# Patient Record
Sex: MALE | Race: WHITE | NOT HISPANIC OR LATINO | URBAN - METROPOLITAN AREA
[De-identification: names, ages, dates, MRNs, and addresses within clinical notes are randomized per-mention and may not be internally consistent; named-entity substitution may affect disease eponyms.]

---

## 2017-06-24 ENCOUNTER — INPATIENT HOSPITAL (OUTPATIENT)
Dept: URBAN - METROPOLITAN AREA HOSPITAL 84 | Facility: HOSPITAL | Age: 52
End: 2017-06-24

## 2017-06-24 DIAGNOSIS — K85.90 ACUTE PANCREATITIS WITHOUT NECROSIS OR INFECTION, UNSPECIFIE: ICD-10-CM

## 2017-06-24 PROCEDURE — 99251: CPT | Performed by: INTERNAL MEDICINE

## 2017-06-25 ENCOUNTER — INPATIENT HOSPITAL (OUTPATIENT)
Dept: URBAN - METROPOLITAN AREA HOSPITAL 84 | Facility: HOSPITAL | Age: 52
End: 2017-06-25
Payer: COMMERCIAL

## 2017-06-25 DIAGNOSIS — K85.90 ACUTE PANCREATITIS WITHOUT NECROSIS OR INFECTION, UNSPECIFIE: ICD-10-CM

## 2017-06-25 PROCEDURE — 99232 SBSQ HOSP IP/OBS MODERATE 35: CPT | Performed by: INTERNAL MEDICINE

## 2017-07-05 ENCOUNTER — OFFICE (OUTPATIENT)
Dept: URBAN - METROPOLITAN AREA CLINIC 64 | Facility: CLINIC | Age: 52
End: 2017-07-05
Payer: COMMERCIAL

## 2017-07-05 VITALS
WEIGHT: 204 LBS | HEIGHT: 74 IN | SYSTOLIC BLOOD PRESSURE: 110 MMHG | HEART RATE: 75 BPM | DIASTOLIC BLOOD PRESSURE: 69 MMHG

## 2017-07-05 DIAGNOSIS — K80.20 CALCULUS OF GALLBLADDER WITHOUT CHOLECYSTITIS WITHOUT OBSTRU: ICD-10-CM

## 2017-07-05 DIAGNOSIS — K59.1 FUNCTIONAL DIARRHEA: ICD-10-CM

## 2017-07-05 DIAGNOSIS — K85.90 ACUTE PANCREATITIS WITHOUT NECROSIS OR INFECTION, UNSPECIFIE: ICD-10-CM

## 2017-07-05 PROCEDURE — 99213 OFFICE O/P EST LOW 20 MIN: CPT | Performed by: NURSE PRACTITIONER

## 2017-07-12 LAB
LIPID PANEL: CHOLESTEROL, TOTAL: 96 MG/DL — LOW (ref 100–199)
LIPID PANEL: COMMENT: (no result)
LIPID PANEL: HDL CHOLESTEROL: 17 MG/DL — LOW (ref 39–?)
LIPID PANEL: LDL CHOLESTEROL CALC: (no result) MG/DL
LIPID PANEL: TRIGLYCERIDES: 735 MG/DL — CRITICAL HIGH (ref 0–149)
LIPID PANEL: VLDL CHOLESTEROL CAL: (no result) MG/DL

## 2017-07-21 LAB
C DIFFICILE TOXINS A+B, EIA: NEGATIVE
OVA + PARASITE EXAM: (no result)
RESULT: RESULT 1: (no result)
STOOL CULTURE: CAMPYLOBACTER CULTURE: (no result)
STOOL CULTURE: E COLI SHIGA TOXIN EIA: NEGATIVE
STOOL CULTURE: SALMONELLA/SHIGELLA SCREEN: (no result)
WHITE BLOOD CELLS (WBC), STOOL: (no result)

## 2017-08-23 ENCOUNTER — HOSPITAL ENCOUNTER (OUTPATIENT)
Dept: LAB | Facility: HOSPITAL | Age: 52
Discharge: HOME | End: 2017-08-23
Attending: SURGERY | Admitting: SURGERY

## 2017-08-23 LAB
ALBUMIN SERPL-MCNC: 3.8 G/DL (ref 3.5–4.8)
ALBUMIN/GLOB SERPL: 1.8 {RATIO} (ref 1–1.7)
ALP SERPL-CCNC: 58 IU/L (ref 32–91)
ALT SERPL-CCNC: ABNORMAL IU/L (ref 17–63)
ANION GAP SERPL CALC-SCNC: 18.9 MMOL/L (ref 10–20)
AST SERPL-CCNC: ABNORMAL IU/L (ref 15–41)
BILIRUB SERPL-MCNC: ABNORMAL MG/DL (ref 0.3–1.2)
BUN SERPL-MCNC: 11 MG/DL (ref 8–20)
BUN/CREAT SERPL: 36.7 (ref 6.2–20.3)
CALCIUM SERPL-MCNC: 9.3 MG/DL (ref 8.9–10.3)
CHLORIDE SERPL-SCNC: 97 MMOL/L (ref 101–111)
CONV CO2: 22 MMOL/L (ref 22–32)
CONV TOTAL PROTEIN: 5.9 G/DL (ref 6.1–7.9)
CREAT UR-MCNC: 0.3 MG/DL (ref 0.7–1.2)
GLOBULIN UR ELPH-MCNC: 2.1 G/DL (ref 2.5–3.8)
GLUCOSE SERPL-MCNC: ABNORMAL MG/DL (ref 65–99)
POTASSIUM SERPL-SCNC: ABNORMAL MMOL/L (ref 3.6–5.1)
SODIUM SERPL-SCNC: 133 MMOL/L (ref 136–144)

## 2017-08-25 ENCOUNTER — HOSPITAL ENCOUNTER (OUTPATIENT)
Dept: OTHER | Facility: HOSPITAL | Age: 52
Setting detail: SPECIMEN
Discharge: HOME | End: 2017-08-25
Attending: SURGERY | Admitting: SURGERY

## 2018-06-20 ENCOUNTER — HOSPITAL ENCOUNTER (OUTPATIENT)
Dept: CARDIOLOGY | Facility: HOSPITAL | Age: 53
Discharge: HOME | End: 2018-06-20
Attending: SURGERY | Admitting: SURGERY

## 2019-02-08 ENCOUNTER — HOSPITAL ENCOUNTER (OUTPATIENT)
Dept: LAB | Facility: HOSPITAL | Age: 54
Setting detail: SPECIMEN
Discharge: HOME | End: 2019-02-08
Attending: INTERNAL MEDICINE | Admitting: INTERNAL MEDICINE

## 2019-02-08 LAB
ALBUMIN SERPL-MCNC: 3.8 G/DL (ref 3.5–4.8)
ALBUMIN/GLOB SERPL: 1.6 {RATIO} (ref 1–1.7)
ALP SERPL-CCNC: 70 IU/L (ref 32–91)
ALT SERPL-CCNC: 23 IU/L (ref 17–63)
ANION GAP SERPL CALC-SCNC: 16.8 MMOL/L (ref 10–20)
AST SERPL-CCNC: 23 IU/L (ref 15–41)
BILIRUB SERPL-MCNC: 0.8 MG/DL (ref 0.3–1.2)
BUN SERPL-MCNC: 15 MG/DL (ref 8–20)
BUN/CREAT SERPL: 18.8 (ref 6.2–20.3)
CALCIUM SERPL-MCNC: 9.4 MG/DL (ref 8.9–10.3)
CHLORIDE SERPL-SCNC: 97 MMOL/L (ref 101–111)
CHOLEST SERPL-MCNC: 432 MG/DL
CONV CO2: 22 MMOL/L (ref 22–32)
CONV LDL CHOLESTEROL DIRECT: ABNORMAL MG/DL (ref 0–100)
CONV MICROALBUM.,U,RANDOM: 228 MG/L
CONV TOTAL PROTEIN: 6.2 G/DL (ref 6.1–7.9)
CREAT 24H UR-MCNC: 80.6 MG/DL
CREAT UR-MCNC: 0.8 MG/DL (ref 0.7–1.2)
GLOBULIN UR ELPH-MCNC: 2.4 G/DL (ref 2.5–3.8)
GLUCOSE SERPL-MCNC: 259 MG/DL (ref 65–99)
HDLC SERPL-MCNC: ABNORMAL MG/DL
LIPID INTERPRETATION: ABNORMAL
MICROALBUMIN/CREAT UR: 282.9 UG/MG
POTASSIUM SERPL-SCNC: 4.8 MMOL/L (ref 3.6–5.1)
SODIUM SERPL-SCNC: ABNORMAL MMOL/L (ref 136–144)
TRIGL SERPL-MCNC: ABNORMAL MG/DL

## 2019-06-14 ENCOUNTER — HOSPITAL ENCOUNTER (OUTPATIENT)
Dept: CARDIOLOGY | Facility: HOSPITAL | Age: 54
Discharge: HOME | End: 2019-06-14
Attending: INTERNAL MEDICINE | Admitting: INTERNAL MEDICINE

## 2019-06-28 RX ORDER — GLIMEPIRIDE 2 MG/1
TABLET ORAL
Qty: 180 TABLET | Refills: 1 | Status: SHIPPED | OUTPATIENT
Start: 2019-06-28 | End: 2020-01-03

## 2019-07-08 RX ORDER — INSULIN ASPART 100 [IU]/ML
INJECTION, SOLUTION INTRAVENOUS; SUBCUTANEOUS
Qty: 45 ML | Refills: 4 | Status: SHIPPED | OUTPATIENT
Start: 2019-07-08 | End: 2020-05-22

## 2019-08-01 ENCOUNTER — TELEPHONE (OUTPATIENT)
Dept: ENDOCRINOLOGY | Facility: CLINIC | Age: 54
End: 2019-08-01

## 2019-08-01 NOTE — TELEPHONE ENCOUNTER
CALLED PATIENT TO RESCHEDULE THE 08/16 DR. JOHANSEN APPT.. HAD TO LEAVE A VM TO CALL THE LENNIE TO RE-SCHEDULE

## 2019-09-05 PROBLEM — E78.5 HYPERLIPIDEMIA: Status: ACTIVE | Noted: 2019-09-05

## 2019-09-05 PROBLEM — I10 HYPERTENSION: Status: ACTIVE | Noted: 2019-09-05

## 2019-09-05 PROBLEM — E55.9 VITAMIN D DEFICIENCY: Status: ACTIVE | Noted: 2018-08-03

## 2019-09-05 PROBLEM — I25.10 CORONARY ARTERY DISEASE: Status: ACTIVE | Noted: 2019-09-05

## 2019-09-05 RX ORDER — METOPROLOL TARTRATE 50 MG/1
TABLET, FILM COATED ORAL
COMMUNITY
Start: 2018-06-22 | End: 2020-01-03

## 2019-09-05 RX ORDER — AMLODIPINE BESYLATE 5 MG/1
TABLET ORAL EVERY 24 HOURS
COMMUNITY
Start: 2014-09-05 | End: 2020-11-20 | Stop reason: SDUPTHER

## 2019-09-05 RX ORDER — CHLORAL HYDRATE 500 MG
CAPSULE ORAL
COMMUNITY
Start: 2017-07-12 | End: 2020-01-17

## 2019-09-05 RX ORDER — CHOLESTYRAMINE 4 G/9G
POWDER, FOR SUSPENSION ORAL
COMMUNITY
Start: 2014-09-08 | End: 2020-06-01

## 2019-09-05 RX ORDER — CHOLECALCIFEROL (VITAMIN D3) 1250 MCG
1 CAPSULE ORAL
COMMUNITY
Start: 2018-08-03 | End: 2020-01-17

## 2019-09-05 RX ORDER — FENOFIBRATE 160 MG/1
TABLET ORAL EVERY 24 HOURS
COMMUNITY
Start: 2018-09-18 | End: 2021-07-02 | Stop reason: SDUPTHER

## 2019-09-05 RX ORDER — LISINOPRIL 20 MG/1
TABLET ORAL EVERY 12 HOURS
COMMUNITY
Start: 2017-12-07 | End: 2021-02-12 | Stop reason: ALTCHOICE

## 2019-09-05 RX ORDER — ROSUVASTATIN CALCIUM 40 MG/1
TABLET, COATED ORAL
COMMUNITY
Start: 2014-09-05 | End: 2021-07-02 | Stop reason: SDUPTHER

## 2019-09-05 RX ORDER — ICOSAPENT ETHYL 1000 MG/1
2 CAPSULE ORAL EVERY 12 HOURS
COMMUNITY
Start: 2018-03-13 | End: 2020-07-27

## 2019-09-13 ENCOUNTER — OFFICE VISIT (OUTPATIENT)
Dept: ENDOCRINOLOGY | Facility: CLINIC | Age: 54
End: 2019-09-13

## 2019-09-13 VITALS
HEIGHT: 74 IN | BODY MASS INDEX: 27.16 KG/M2 | OXYGEN SATURATION: 98 % | HEART RATE: 67 BPM | DIASTOLIC BLOOD PRESSURE: 82 MMHG | SYSTOLIC BLOOD PRESSURE: 122 MMHG | WEIGHT: 211.6 LBS

## 2019-09-13 DIAGNOSIS — E78.2 MIXED HYPERLIPIDEMIA: ICD-10-CM

## 2019-09-13 DIAGNOSIS — E11.65 TYPE 2 DIABETES MELLITUS WITH HYPERGLYCEMIA, UNSPECIFIED WHETHER LONG TERM INSULIN USE (HCC): Primary | ICD-10-CM

## 2019-09-13 DIAGNOSIS — I10 ESSENTIAL HYPERTENSION: ICD-10-CM

## 2019-09-13 LAB — GLUCOSE BLDC GLUCOMTR-MCNC: 166 MG/DL (ref 70–130)

## 2019-09-13 PROCEDURE — 82962 GLUCOSE BLOOD TEST: CPT | Performed by: INTERNAL MEDICINE

## 2019-09-13 PROCEDURE — 99214 OFFICE O/P EST MOD 30 MIN: CPT | Performed by: INTERNAL MEDICINE

## 2019-09-13 NOTE — PATIENT INSTRUCTIONS
Jardiance 10 mg p.o. daily  Increase fluid intake  Please continue to watch her diet is specifically red meat fried foods and dairy products  Follow-up in 3 months with labs  Please always keep glucose source with you in case of low blood sugars and make sure your sugars above 100 when you are driving  Please get annual eye exam and flu vaccine.

## 2019-09-13 NOTE — PROGRESS NOTES
Endocrine Progress Note Outpatient     Patient Care Team:  Prabhu Downing MD as PCP - General  Prabhu Downing MD as PCP - Family Medicine    Chief Complaint: Follow up type 2 diabetes    HPI: 54-year-old male with history of type 2 diabetes, hypertension, hyperlipidemia specifically hypertriglyceridemia is here for follow-up.  For type 2 diabetes currently on metformin 1000 twice a day, you 500 insulin 0.15 mL subcu in the morning and 0.12 mL subcu in the evening along with NovoLog 10 units before breakfast and lunch and 22 before supper along with glimepiride forming on twice a day.  He did bring in blood sugar records for review and average sugar is about 260.  Hypertension: Well-controlled  Hyperlipidemia: He is currently on Crestor with fenofibrate and cholestyramine and with Vascepa.  We tried Praluent however he is not taking that at this time, we also referred him to a nephrologist for plasmapheresis for triglycerides however he tells me that this is cost prohibitive.  Does have history of pancreatitis.    Past Medical History:   Diagnosis Date   • CAD (coronary artery disease)    • Hyperlipidemia    • Hypertension    • Type 2 diabetes mellitus (CMS/[facility])        Social History     Socioeconomic History   • Marital status:      Spouse name: Not on file   • Number of children: Not on file   • Years of education: Not on file   • Highest education level: Not on file   Tobacco Use   • Smoking status: Never Smoker   • Smokeless tobacco: Never Used   Substance and Sexual Activity   • Alcohol use: No     Frequency: Never   • Drug use: Defer   • Sexual activity: Defer       Family History   Problem Relation Age of Onset   • Heart attack Mother    • Alcohol abuse Father        Allergies   Allergen Reactions   • Levofloxacin Unknown (See Comments)       ROS:   Constitutional:  Admit fatigue, tiredness.    Eyes:  Denies change in visual acuity   HENT:  Denies nasal congestion or sore throat   Respiratory: denies  cough, shortness of breath.   Cardiovascular:  denies chest pain, edema   GI:  Denies abdominal pain, nausea, vomiting.   Musculoskeletal:  Denies back pain or joint pain   Integument:  Denies dry skin and rash   Neurologic:  Denies headache, focal weakness or sensory changes   Endocrine:  Denies polyuria or polydipsia   Psychiatric:  Denies depression or anxiety      Vitals:    09/13/19 0926   BP: 122/82   Pulse: 67   SpO2: 98%       Physical Exam:  GEN: NAD, conversant  EYES: EOMI, PERRL, no conjunctival erythema  NECK: no thyromegaly, full ROM   CV: RRR, no murmurs/rubs/gallops, no peripheral edema  LUNG: CTAB, no wheezes/rales/ronchi  SKIN: no rashes, no acanthosis, has a skin nodules most likely from the triglycerides being elevated  MSK: no deformities, full ROM of all extremities  NEURO: no tremors, DTR normal  PSYCH: AOX3, appropriate mood, affect normal      Results Review:     I reviewed the patient's new clinical results.      Lab Results   Component Value Date    GLUCOSE 259 (H) 02/08/2019    BUN 15 02/08/2019    CREATININE 0.8 02/08/2019    BCR 18.8 02/08/2019    K 4.8 02/08/2019    CO2 22 02/08/2019    CALCIUM 9.4 02/08/2019    ALBUMIN 3.8 02/08/2019    LABIL2 1.6 02/08/2019    AST 23 02/08/2019    ALT 23 02/08/2019    CHOL 432 (H) 02/08/2019    TRIG 8310 Verified reportable range (H) 02/08/2019    LDL UNABLE TO REPORT DUE TO HIGH TRIGLYCERIDES 02/08/2019    HDL UNABLE TO REPORT DUE TO HIGH TRIGLYCERIDES 02/08/2019     Labs from September 2019 showed a triglyceride level of 7861, A1c 8.1, sodium 123, potassium 4.3, chloride 91, 0 22, glucose 279, BUN 15, creatinine 1.2, AST 25, ALT 18.    Medication Review: Reviewed.       Current Outpatient Medications:   •  amLODIPine (NORVASC) 5 MG tablet, Daily., Disp: , Rfl:   •  aspirin 81 MG tablet, ASPIRIN 81 MG ORAL TABLET, Disp: , Rfl:   •  Cholecalciferol (VITAMIN D3) 98149 units capsule, 1 capsule Every 7 (Seven) Days., Disp: , Rfl:   •  cholestyramine  (QUESTRAN) 4 g packet, CHOLESTYRAMINE 4 GM PACK, Disp: , Rfl:   •  fenofibrate 160 MG tablet, Daily., Disp: , Rfl:   •  glimepiride (AMARYL) 2 MG tablet, TAKE ONE TABLET BY MOUTH TWICE A DAY, Disp: 180 tablet, Rfl: 1  •  glucagon (GLUCAGON EMERGENCY) 1 MG injection, GLUCAGON EMERGENCY 1 MG KIT, Disp: , Rfl:   •  icosapent ethyl (VASCEPA) 1 g capsule capsule, 2 capsules Every 12 (Twelve) Hours., Disp: , Rfl:   •  Insulin Pen Needle (B-D UF III MINI PEN NEEDLES) 31G X 5 MM misc, tid, Disp: , Rfl:   •  insulin regular (HUMULIN R) 500 UNIT/ML CONCENTRATED injection, 15 in am 12 in the evening, Disp: , Rfl:   •  lisinopril (PRINIVIL,ZESTRIL) 20 MG tablet, Every 12 (Twelve) Hours., Disp: , Rfl:   •  metFORMIN (GLUCOPHAGE) 1000 MG tablet, bid, Disp: , Rfl:   •  metoprolol tartrate (LOPRESSOR) 50 MG tablet, 1.5 tablets bid, Disp: , Rfl:   •  NOVOLOG FLEXPEN 100 UNIT/ML solution pen-injector sc pen, INJECT 13 UNITS SUBCUTANEOUSLY BEFORE BREAKFAST, 13 UNITS BEFORE LUNCH, AND 24 UNITS WITH SUPPER (Patient taking differently: INJECT 10 UNITS SUBCUTANEOUSLY BEFORE BREAKFAST, 10 UNITS BEFORE LUNCH, AND 22 UNITS WITH SUPPER), Disp: 45 mL, Rfl: 4  •  Red Yeast Rice 500 MG/0.5GM powder, RED YEAST RICE, Disp: , Rfl:   •  rosuvastatin (CRESTOR) 40 MG tablet, CRESTOR 40 MG TABS, Disp: , Rfl:   •  Omega-3 Fatty Acids (FISH OIL) 1000 MG capsule capsule, FISH OIL 1000 MG CAPS, Disp: , Rfl:   •  PRALUENT 75 MG/ML solution pen-injector, INJECT 1 PEN UNDER THE SKIN Q 2 WEEKS., Disp: , Rfl: 3      Assessment/Plan   1.  Diabetes mellitus type II: Uncontrolled with high A1c of 8.1%.  At this time I will add Jardiance 10 mg p.o. daily and follow lipid panel.  Advised to drink plenty of water as Jardiance can cause dehydration also have increased risk for yeast infections.  2.  Severe hypertriglyceridemia: He is currently on Crestor with fenofibrate, Vascepa and cholestyramine.  Praluent did not help. Again advised to continue to work on diet and  "activity.  Plasmapheresis is cost prohibitive.  Remains high risk for acute pancreatitis and CAD.  3.  Hypertension: Well-controlled, continue current medication  4.  Hyponatremia: Most likely pseudohyponatremia secondary to severe hypertriglyceridemia.  He is asymptomatic.            Rosario Mendenhall MD FACE.      EMR Dragon / transcription disclaimer:     \"Dictated utilizing Dragon dictation\".                 "

## 2019-11-11 ENCOUNTER — TELEPHONE (OUTPATIENT)
Dept: ENDOCRINOLOGY | Facility: CLINIC | Age: 54
End: 2019-11-11

## 2019-11-11 NOTE — TELEPHONE ENCOUNTER
Pt dropped off recent BG's.  Pt states he has more low BG's around 2-3 am.  Pt states he has been taking 13 units of Humulin R U500 in the evening.  Per MD s/o, instructed pt to lower his pm U500 to 12 units.  Pt verbalized an understanding.

## 2020-01-03 ENCOUNTER — OFFICE VISIT (OUTPATIENT)
Dept: CARDIOLOGY | Facility: CLINIC | Age: 55
End: 2020-01-03

## 2020-01-03 VITALS
DIASTOLIC BLOOD PRESSURE: 80 MMHG | SYSTOLIC BLOOD PRESSURE: 145 MMHG | HEIGHT: 73 IN | WEIGHT: 209 LBS | HEART RATE: 93 BPM | OXYGEN SATURATION: 98 % | BODY MASS INDEX: 27.7 KG/M2

## 2020-01-03 DIAGNOSIS — I25.708 CORONARY ARTERY DISEASE OF BYPASS GRAFT OF NATIVE HEART WITH STABLE ANGINA PECTORIS (HCC): Primary | ICD-10-CM

## 2020-01-03 DIAGNOSIS — I10 ESSENTIAL HYPERTENSION: ICD-10-CM

## 2020-01-03 DIAGNOSIS — E11.9 TYPE 2 DIABETES MELLITUS WITHOUT COMPLICATION, WITHOUT LONG-TERM CURRENT USE OF INSULIN (HCC): ICD-10-CM

## 2020-01-03 DIAGNOSIS — E78.00 PURE HYPERCHOLESTEROLEMIA: ICD-10-CM

## 2020-01-03 PROCEDURE — 99214 OFFICE O/P EST MOD 30 MIN: CPT | Performed by: INTERNAL MEDICINE

## 2020-01-03 RX ORDER — METOPROLOL TARTRATE 50 MG/1
TABLET, FILM COATED ORAL
Qty: 270 TABLET | Refills: 0 | Status: SHIPPED | OUTPATIENT
Start: 2020-01-03 | End: 2020-01-03 | Stop reason: SDUPTHER

## 2020-01-03 RX ORDER — GLIMEPIRIDE 2 MG/1
TABLET ORAL
Qty: 180 TABLET | Refills: 0 | Status: SHIPPED | OUTPATIENT
Start: 2020-01-03 | End: 2020-01-17

## 2020-01-03 RX ORDER — CLOPIDOGREL BISULFATE 75 MG/1
TABLET ORAL
COMMUNITY
Start: 2018-06-28 | End: 2020-01-03

## 2020-01-03 RX ORDER — METOPROLOL TARTRATE 50 MG/1
75 TABLET, FILM COATED ORAL 2 TIMES DAILY
Qty: 270 TABLET | Refills: 3 | Status: SHIPPED | OUTPATIENT
Start: 2020-01-03 | End: 2021-01-07

## 2020-01-03 NOTE — PROGRESS NOTES
"    Subjective:     Encounter Date:01/03/2020      Patient ID: Rodney Chaney is a 54 y.o. male.    Chief Complaint:  History of Present Illness 54-year-old white male with history of coronary disease status post coronary artery bypass surgery diabetes hypertension hyperlipidemia presents to my office for follow-up.  Patient is currently stable without symptoms of chest pain or shortness of breath at rest or exertion.  No complaint of any PND orthopnea.  He has occasional palpitations without any dizziness syncope or swelling of the feet PTs take his medicines regularly.  In the last 6 months he had only one episode of palpitations.  He does not smoke.  He is following his good diet and exercising regularly.    The following portions of the patient's history were reviewed and updated as appropriate: allergies, current medications, past family history, past medical history, past social history, past surgical history and problem list.  Past Medical History:   Diagnosis Date   • CAD (coronary artery disease)    • Hyperlipidemia    • Hypertension    • Type 2 diabetes mellitus (CMS/HCC)      Past Surgical History:   Procedure Laterality Date   • APPENDECTOMY     • CHOLECYSTECTOMY     • CORONARY ARTERY BYPASS GRAFT       /80   Pulse 93   Ht 185.4 cm (73\")   Wt 94.8 kg (209 lb)   SpO2 98%   BMI 27.57 kg/m²   Family History   Problem Relation Age of Onset   • Heart attack Mother    • Alcohol abuse Father        Current Outpatient Medications:   •  amLODIPine (NORVASC) 5 MG tablet, Daily., Disp: , Rfl:   •  aspirin 81 MG tablet, ASPIRIN 81 MG ORAL TABLET, Disp: , Rfl:   •  Cholecalciferol (VITAMIN D3) 48017 units capsule, 1 capsule Every 7 (Seven) Days., Disp: , Rfl:   •  cholestyramine (QUESTRAN) 4 g packet, CHOLESTYRAMINE 4 GM PACK, Disp: , Rfl:   •  Empagliflozin (JARDIANCE) 10 MG tablet, Take 10 mg by mouth Daily., Disp: 30 tablet, Rfl: 4  •  fenofibrate 160 MG tablet, Daily., Disp: , Rfl:   •  glimepiride " (AMARYL) 2 MG tablet, TAKE ONE TABLET BY MOUTH TWICE A DAY, Disp: 180 tablet, Rfl: 0  •  glucagon (GLUCAGON EMERGENCY) 1 MG injection, GLUCAGON EMERGENCY 1 MG KIT, Disp: , Rfl:   •  icosapent ethyl (VASCEPA) 1 g capsule capsule, 2 capsules Every 12 (Twelve) Hours., Disp: , Rfl:   •  Insulin Pen Needle (B-D UF III MINI PEN NEEDLES) 31G X 5 MM misc, tid, Disp: , Rfl:   •  insulin regular (HUMULIN R) 500 UNIT/ML CONCENTRATED injection, 15 in am 12 in the evening, Disp: , Rfl:   •  lisinopril (PRINIVIL,ZESTRIL) 20 MG tablet, Every 12 (Twelve) Hours., Disp: , Rfl:   •  metFORMIN (GLUCOPHAGE) 1000 MG tablet, bid, Disp: , Rfl:   •  metoprolol tartrate (LOPRESSOR) 50 MG tablet, TAKE ONE AND ONE-HALF (1 & 1/2) TABLET BY MOUTH TWO TIMES A DAY, Disp: 270 tablet, Rfl: 0  •  NOVOLOG FLEXPEN 100 UNIT/ML solution pen-injector sc pen, INJECT 13 UNITS SUBCUTANEOUSLY BEFORE BREAKFAST, 13 UNITS BEFORE LUNCH, AND 24 UNITS WITH SUPPER (Patient taking differently: INJECT 10 UNITS SUBCUTANEOUSLY BEFORE BREAKFAST, 10 UNITS BEFORE LUNCH, AND 22 UNITS WITH SUPPER), Disp: 45 mL, Rfl: 4  •  Omega-3 Fatty Acids (FISH OIL) 1000 MG capsule capsule, FISH OIL 1000 MG CAPS, Disp: , Rfl:   •  Red Yeast Rice 500 MG/0.5GM powder, RED YEAST RICE, Disp: , Rfl:   •  rosuvastatin (CRESTOR) 40 MG tablet, CRESTOR 40 MG TABS, Disp: , Rfl:   Allergies   Allergen Reactions   • Levofloxacin Unknown (See Comments)     Social History     Socioeconomic History   • Marital status:      Spouse name: Not on file   • Number of children: Not on file   • Years of education: Not on file   • Highest education level: Not on file   Tobacco Use   • Smoking status: Never Smoker   • Smokeless tobacco: Never Used   Substance and Sexual Activity   • Alcohol use: No     Frequency: Never   • Drug use: Defer   • Sexual activity: Defer     Review of Systems   Constitution: Positive for malaise/fatigue. Negative for fever.   HENT: Negative for ear pain and nosebleeds.    Eyes:  Negative for blurred vision and double vision.   Cardiovascular: Positive for palpitations. Negative for chest pain, dyspnea on exertion and leg swelling.   Respiratory: Negative for cough and shortness of breath.    Skin: Negative for rash.   Musculoskeletal: Negative for joint pain.   Gastrointestinal: Negative for abdominal pain, nausea and vomiting.   Neurological: Negative for focal weakness, headaches, light-headedness and numbness.   Psychiatric/Behavioral: Negative for depression. The patient is not nervous/anxious.    All other systems reviewed and are negative.             Objective:     Physical Exam   Constitutional: He appears well-developed and well-nourished.   HENT:   Head: Normocephalic and atraumatic.   Eyes: Pupils are equal, round, and reactive to light. Conjunctivae and EOM are normal. No scleral icterus.   Neck: Normal range of motion. Neck supple. No JVD present. Carotid bruit is not present.   Cardiovascular: Normal rate, regular rhythm, S1 normal, S2 normal, normal heart sounds and intact distal pulses. PMI is not displaced.   Pulmonary/Chest: Effort normal and breath sounds normal. He has no wheezes. He has no rales.   Abdominal: Soft. Bowel sounds are normal.   Musculoskeletal: Normal range of motion.   Neurological: He is alert. He has normal strength.   No focal deficits   Skin: Skin is warm and dry. No rash noted.   Psychiatric: He has a normal mood and affect.     Procedures    Lab Review:       Assessment:          Diagnosis Plan   1. Coronary artery disease of bypass graft of native heart with stable angina pectoris (CMS/HCC)     2. Pure hypercholesterolemia     3. Essential hypertension     4. Type 2 diabetes mellitus without complication, without long-term current use of insulin (CMS/[facility])            Plan:       Patient has history of coronary status post carotid bypass surgery x4 vessels with a LIMA to LAD and saphenous graft to the diagonal branch marginal branch and RCA  Patient  has normal LV function  Patient also has stent placements in the past  Patient blood pressure and heart rate are stable  Patient's lipid levels and sugar levels are followed by the primary care doctor  Patient has occasional palpitations but they are not associate with any other symptoms.  Continue current medicines and follow-up in 6 months

## 2020-01-06 PROBLEM — E78.1 PURE HYPERTRIGLYCERIDEMIA: Status: ACTIVE | Noted: 2018-05-11

## 2020-01-06 PROBLEM — E11.9 TYPE 2 DIABETES MELLITUS WITHOUT COMPLICATION (HCC): Status: ACTIVE | Noted: 2018-05-11

## 2020-01-06 PROBLEM — I10 ESSENTIAL (PRIMARY) HYPERTENSION: Status: ACTIVE | Noted: 2018-05-11

## 2020-01-17 ENCOUNTER — OFFICE VISIT (OUTPATIENT)
Dept: ENDOCRINOLOGY | Facility: CLINIC | Age: 55
End: 2020-01-17

## 2020-01-17 VITALS
BODY MASS INDEX: 27.7 KG/M2 | SYSTOLIC BLOOD PRESSURE: 118 MMHG | HEART RATE: 59 BPM | OXYGEN SATURATION: 100 % | DIASTOLIC BLOOD PRESSURE: 75 MMHG | HEIGHT: 73 IN | WEIGHT: 209 LBS

## 2020-01-17 DIAGNOSIS — E11.65 TYPE 2 DIABETES MELLITUS WITH HYPERGLYCEMIA, WITHOUT LONG-TERM CURRENT USE OF INSULIN (HCC): Primary | ICD-10-CM

## 2020-01-17 DIAGNOSIS — I10 ESSENTIAL (PRIMARY) HYPERTENSION: ICD-10-CM

## 2020-01-17 DIAGNOSIS — E78.1 PURE HYPERTRIGLYCERIDEMIA: ICD-10-CM

## 2020-01-17 PROCEDURE — 99214 OFFICE O/P EST MOD 30 MIN: CPT | Performed by: INTERNAL MEDICINE

## 2020-01-17 NOTE — PROGRESS NOTES
Endocrine Progress Note Outpatient     Patient Care Team:  Prabhu Downing MD as PCP - General  Prabhu Downing MD as PCP - Family Medicine  Ham Jacinto MD as Consulting Physician (Cardiology)    Chief Complaint: Follow up type 2 diabetes    HPI: 54-year-old male with history of type 2 diabetes, hypertension, hyperlipidemia specifically hypertriglyceridemia is here for follow-up.  For type 2 diabetes currently on metformin 1000 twice a day, U 500 insulin 0.15 mL subcu in the morning and 0.12 mL subcu in the evening along with NovoLog 10 units before breakfast and lunch and 22 before supper along with glimepiride 4 mg twice a day and Jardiance 10 mg po daily. He did bring in blood sugar records for review and in the last 2 weeks his blood sugars are improving.  He is having some low blood sugars into 40s and 50s and 60s.  Hypertension: Well-controlled  Hyperlipidemia-hypertriglyceridemia: He is currently on Crestor with fenofibrate and cholestyramine and with Vascepa.  We tried Praluent however he is not taking that at this time, we also referred him to a nephrologist for plasmapheresis for triglycerides however he tells me that this is cost prohibitive.  Does have history of pancreatitis.    Past Medical History:   Diagnosis Date   • CAD (coronary artery disease)    • Hyperlipidemia    • Hypertension    • Type 2 diabetes mellitus (CMS/[facility])        Social History     Socioeconomic History   • Marital status:      Spouse name: Not on file   • Number of children: Not on file   • Years of education: Not on file   • Highest education level: Not on file   Tobacco Use   • Smoking status: Never Smoker   • Smokeless tobacco: Never Used   Substance and Sexual Activity   • Alcohol use: No     Frequency: Never   • Drug use: Defer   • Sexual activity: Defer       Family History   Problem Relation Age of Onset   • Heart attack Mother    • Alcohol abuse Father        Allergies   Allergen Reactions   • Levofloxacin Unknown  (See Comments)       ROS:   Constitutional:  Denies fatigue, tiredness.    Eyes:  Denies change in visual acuity   HENT:  Denies nasal congestion or sore throat   Respiratory: denies cough, shortness of breath.   Cardiovascular:  denies chest pain, edema   GI:  Denies abdominal pain, nausea, vomiting.   Musculoskeletal:  Denies back pain or joint pain   Integument:  Denies dry skin and rash   Neurologic:  Denies headache, focal weakness or sensory changes   Endocrine:  Denies polyuria or polydipsia   Psychiatric:  Denies depression or anxiety      Vitals:    01/17/20 0857   BP: 118/75   Pulse: 59   SpO2: 100%       Physical Exam:  GEN: NAD, conversant  EYES: EOMI, PERRL, no conjunctival erythema  NECK: no thyromegaly, full ROM   CV: RRR, no murmurs/rubs/gallops, no peripheral edema  LUNG: CTAB, no wheezes/rales/ronchi  SKIN: no rashes, no acanthosis, has a skin nodules most likely from the triglycerides being elevated  MSK: no deformities, full ROM of all extremities  NEURO: no tremors, DTR normal  PSYCH: AOX3, appropriate mood, affect normal      Results Review:     I reviewed the patient's new clinical results.      Lab Results   Component Value Date    GLUCOSE 259 (H) 02/08/2019    BUN 15 02/08/2019    CREATININE 0.8 02/08/2019    BCR 18.8 02/08/2019    K 4.8 02/08/2019    CO2 22 02/08/2019    CALCIUM 9.4 02/08/2019    ALBUMIN 3.8 02/08/2019    LABIL2 1.6 02/08/2019    AST 23 02/08/2019    ALT 23 02/08/2019    CHOL 432 (H) 02/08/2019    TRIG 8310 Verified reportable range (H) 02/08/2019    LDL UNABLE TO REPORT DUE TO HIGH TRIGLYCERIDES 02/08/2019    HDL UNABLE TO REPORT DUE TO HIGH TRIGLYCERIDES 02/08/2019       Labs from January 10, 2020 showed a triglycerides of 3098, A1c 8.6%, TSH 1.53, free T4 1.3, sodium 134, potassium 4.4, chloride 99, CO2 18, glucose 173, BUN 14, creatinine 1.0 and albumin creatinine ratio was 111.    Labs from September 2019 showed a triglyceride level of 7861, A1c 8.1, sodium 123,  potassium 4.3, chloride 91, 0 22, glucose 279, BUN 15, creatinine 1.2, AST 25, ALT 18.    Medication Review: Reviewed.       Current Outpatient Medications:   •  amLODIPine (NORVASC) 5 MG tablet, Daily., Disp: , Rfl:   •  aspirin 81 MG tablet, ASPIRIN 81 MG ORAL TABLET, Disp: , Rfl:   •  cholestyramine (QUESTRAN) 4 g packet, CHOLESTYRAMINE 4 GM PACK, Disp: , Rfl:   •  Empagliflozin (JARDIANCE) 10 MG tablet, Take 10 mg by mouth Daily., Disp: 30 tablet, Rfl: 4  •  fenofibrate 160 MG tablet, Daily., Disp: , Rfl:   •  glimepiride (AMARYL) 2 MG tablet, TAKE ONE TABLET BY MOUTH TWICE A DAY, Disp: 180 tablet, Rfl: 0  •  glucagon (GLUCAGON EMERGENCY) 1 MG injection, GLUCAGON EMERGENCY 1 MG KIT, Disp: , Rfl:   •  icosapent ethyl (VASCEPA) 1 g capsule capsule, 2 capsules Every 12 (Twelve) Hours., Disp: , Rfl:   •  Insulin Pen Needle (B-D UF III MINI PEN NEEDLES) 31G X 5 MM misc, tid, Disp: , Rfl:   •  insulin regular (HUMULIN R) 500 UNIT/ML CONCENTRATED injection, 15 in am 12 in the evening, Disp: , Rfl:   •  lisinopril (PRINIVIL,ZESTRIL) 20 MG tablet, Every 12 (Twelve) Hours., Disp: , Rfl:   •  metFORMIN (GLUCOPHAGE) 1000 MG tablet, bid, Disp: , Rfl:   •  metoprolol tartrate (LOPRESSOR) 50 MG tablet, Take 1.5 tablets by mouth 2 (Two) Times a Day., Disp: 270 tablet, Rfl: 3  •  NOVOLOG FLEXPEN 100 UNIT/ML solution pen-injector sc pen, INJECT 13 UNITS SUBCUTANEOUSLY BEFORE BREAKFAST, 13 UNITS BEFORE LUNCH, AND 24 UNITS WITH SUPPER (Patient taking differently: INJECT 10 UNITS SUBCUTANEOUSLY BEFORE BREAKFAST, 10 UNITS BEFORE LUNCH, AND 22 UNITS WITH SUPPER), Disp: 45 mL, Rfl: 4  •  Red Yeast Rice 500 MG/0.5GM powder, RED YEAST RICE, Disp: , Rfl:   •  rosuvastatin (CRESTOR) 40 MG tablet, CRESTOR 40 MG TABS, Disp: , Rfl:       Assessment/Plan   1.  Diabetes mellitus type II: Uncontrolled with high A1c of 8.6%.  He is having some low blood sugars, will DC glimepiride and continue rest of the medications.  Will follow blood sugars  "and A1c.  He is advised to work on his diet.   2.  Severe hypertriglyceridemia: He is currently on Crestor with fenofibrate, Vascepa and cholestyramine.  Praluent did not help. Again advised to continue to work on diet and activity.  Plasmapheresis is cost prohibitive.  Remains high risk for acute pancreatitis and CAD.  Talked about dietitian consult, he feels like he is well versed and will take care of the diet on his own.  3.  Hypertension: Well-controlled, continue current medication  4.  Hyponatremia: Most likely pseudohyponatremia secondary to severe hypertriglyceridemia.  Improving.             Rosario Mendenhall MD FACE.      EMR Dragon / transcription disclaimer:     \"Dictated utilizing Dragon dictation\".                 "

## 2020-01-17 NOTE — PATIENT INSTRUCTIONS
ARMEN glimepiride  Please continue to work on your diet especially avoid sodas and artificial sweeteners and fried food and dairy products and red meat.  Always keep glucose source with you in case of low blood sugar  Always get annual eye exam and flu vaccine  Follow-up in 4 months with labs.

## 2020-02-24 RX ORDER — EMPAGLIFLOZIN 10 MG/1
TABLET, FILM COATED ORAL
Qty: 30 TABLET | Refills: 3 | Status: SHIPPED | OUTPATIENT
Start: 2020-02-24 | End: 2020-05-22

## 2020-05-14 ENCOUNTER — LAB (OUTPATIENT)
Dept: LAB | Facility: HOSPITAL | Age: 55
End: 2020-05-14

## 2020-05-14 DIAGNOSIS — E78.1 PURE HYPERTRIGLYCERIDEMIA: ICD-10-CM

## 2020-05-14 DIAGNOSIS — I10 ESSENTIAL (PRIMARY) HYPERTENSION: ICD-10-CM

## 2020-05-14 DIAGNOSIS — E11.65 TYPE 2 DIABETES MELLITUS WITH HYPERGLYCEMIA, WITHOUT LONG-TERM CURRENT USE OF INSULIN (HCC): ICD-10-CM

## 2020-05-14 LAB
ALBUMIN SERPL-MCNC: 4.7 G/DL (ref 3.5–5.2)
ALBUMIN UR-MCNC: 11.8 MG/DL
ALBUMIN/GLOB SERPL: 1.8 G/DL
ALP SERPL-CCNC: 64 U/L (ref 39–117)
ALT SERPL W P-5'-P-CCNC: 21 U/L (ref 1–41)
ANION GAP SERPL CALCULATED.3IONS-SCNC: 21.1 MMOL/L (ref 5–15)
ARTICHOKE IGE QN: 11 MG/DL (ref 0–100)
AST SERPL-CCNC: 18 U/L (ref 1–40)
BILIRUB SERPL-MCNC: 0.4 MG/DL (ref 0.2–1.2)
BUN BLD-MCNC: 19 MG/DL (ref 6–20)
BUN/CREAT SERPL: 20.7 (ref 7–25)
CALCIUM SPEC-SCNC: 9.3 MG/DL (ref 8.6–10.5)
CHLORIDE SERPL-SCNC: 96 MMOL/L (ref 98–107)
CHOLEST SERPL-MCNC: 644 MG/DL (ref 0–200)
CO2 SERPL-SCNC: 18.9 MMOL/L (ref 22–29)
CREAT BLD-MCNC: 0.92 MG/DL (ref 0.76–1.27)
CREAT UR-MCNC: 51.9 MG/DL
GFR SERPL CREATININE-BSD FRML MDRD: 85 ML/MIN/1.73
GLOBULIN UR ELPH-MCNC: 2.6 GM/DL
GLUCOSE BLD-MCNC: 279 MG/DL (ref 65–99)
HBA1C MFR BLD: 8.7 % (ref 3.5–5.6)
HDLC SERPL-MCNC: 6 MG/DL (ref 40–60)
LDLC SERPL CALC-MCNC: ABNORMAL MG/DL
LDLC/HDLC SERPL: ABNORMAL {RATIO}
MICROALBUMIN/CREAT UR: 227.4 MG/G
POTASSIUM BLD-SCNC: 4.9 MMOL/L (ref 3.5–5.2)
PROT SERPL-MCNC: 7.3 G/DL (ref 6–8.5)
SODIUM BLD-SCNC: 136 MMOL/L (ref 136–145)
TRIGL SERPL-MCNC: >4425 MG/DL (ref 0–150)
VLDLC SERPL-MCNC: ABNORMAL MG/DL

## 2020-05-14 PROCEDURE — 83036 HEMOGLOBIN GLYCOSYLATED A1C: CPT

## 2020-05-14 PROCEDURE — 82043 UR ALBUMIN QUANTITATIVE: CPT

## 2020-05-14 PROCEDURE — 80053 COMPREHEN METABOLIC PANEL: CPT

## 2020-05-14 PROCEDURE — 80061 LIPID PANEL: CPT

## 2020-05-14 PROCEDURE — 82570 ASSAY OF URINE CREATININE: CPT

## 2020-05-14 PROCEDURE — 83721 ASSAY OF BLOOD LIPOPROTEIN: CPT

## 2020-05-14 PROCEDURE — 36415 COLL VENOUS BLD VENIPUNCTURE: CPT

## 2020-05-22 ENCOUNTER — OFFICE VISIT (OUTPATIENT)
Dept: ENDOCRINOLOGY | Facility: CLINIC | Age: 55
End: 2020-05-22

## 2020-05-22 VITALS
SYSTOLIC BLOOD PRESSURE: 122 MMHG | WEIGHT: 209 LBS | HEIGHT: 73 IN | BODY MASS INDEX: 27.7 KG/M2 | HEART RATE: 68 BPM | DIASTOLIC BLOOD PRESSURE: 70 MMHG | OXYGEN SATURATION: 98 % | TEMPERATURE: 97.6 F

## 2020-05-22 DIAGNOSIS — E78.1 PURE HYPERTRIGLYCERIDEMIA: ICD-10-CM

## 2020-05-22 DIAGNOSIS — I10 ESSENTIAL (PRIMARY) HYPERTENSION: ICD-10-CM

## 2020-05-22 DIAGNOSIS — E11.21 DIABETIC NEPHROPATHY ASSOCIATED WITH TYPE 2 DIABETES MELLITUS (HCC): ICD-10-CM

## 2020-05-22 DIAGNOSIS — E11.65 TYPE 2 DIABETES MELLITUS WITH HYPERGLYCEMIA, WITHOUT LONG-TERM CURRENT USE OF INSULIN (HCC): Primary | ICD-10-CM

## 2020-05-22 PROCEDURE — 99214 OFFICE O/P EST MOD 30 MIN: CPT | Performed by: INTERNAL MEDICINE

## 2020-05-22 RX ORDER — COLESEVELAM 180 1/1
2 TABLET ORAL
COMMUNITY
Start: 2015-09-29 | End: 2020-07-17

## 2020-05-22 RX ORDER — GLIMEPIRIDE 1 MG/1
TABLET ORAL
COMMUNITY
End: 2020-05-22

## 2020-05-22 NOTE — PROGRESS NOTES
Endocrine Progress Note Outpatient     Patient Care Team:  Prabhu Downing MD as PCP - General  Prabhu Downing MD as PCP - Family Medicine  Ham Jacinto MD as Consulting Physician (Cardiology)    Chief Complaint: Follow up type 2 diabetes    HPI: 55-year-old male with history of type 2 diabetes, hypertension, hyperlipidemia specifically hypertriglyceridemia is here for follow-up.  For type 2 diabetes currently on metformin 1000 twice a day, U 500 insulin 0.15 mL subcu in the morning and 0.12 mL subcu in the evening along with NovoLog 10 units before breakfast and lunch and 22 before supper along with Jardiance 10 mg po daily. He did bring in blood sugar records for review blood sugars have been running high.  His main problem is the starches especially bread.  He is not able to control diet.  Does not drink alcohol.  He is trying to control his fat intake.  Hypertension: Well-controlled  Hyperlipidemia-hypertriglyceridemia: He is currently on Crestor with fenofibrate and cholestyramine and with Vascepa.  We tried Praluent however he is not taking that at this time, we also referred him to a nephrologist for plasmapheresis for triglycerides however he tells me that this is cost prohibitive.  Does have history of pancreatitis.    Past Medical History:   Diagnosis Date   • CAD (coronary artery disease)    • Hyperlipidemia    • Hypertension    • Type 2 diabetes mellitus (CMS/[facility])        Social History     Socioeconomic History   • Marital status:      Spouse name: Not on file   • Number of children: Not on file   • Years of education: Not on file   • Highest education level: Not on file   Tobacco Use   • Smoking status: Never Smoker   • Smokeless tobacco: Never Used   Substance and Sexual Activity   • Alcohol use: No     Frequency: Never   • Drug use: Defer   • Sexual activity: Defer       Family History   Problem Relation Age of Onset   • Heart attack Mother    • Alcohol abuse Father        Allergies   Allergen  Reactions   • Levofloxacin Unknown (See Comments)       ROS:   Constitutional:  Denies fatigue, tiredness.    Eyes:  Denies change in visual acuity   HENT:  Denies nasal congestion or sore throat   Respiratory: denies cough, shortness of breath.   Cardiovascular:  denies chest pain, edema   GI:  Denies abdominal pain, nausea, vomiting.   Musculoskeletal:  Denies back pain or joint pain   Integument:  Denies dry skin and rash   Neurologic:  Denies headache, focal weakness or sensory changes   Endocrine:  Denies polyuria or polydipsia   Psychiatric:  Denies depression or anxiety      Vitals:    05/22/20 1112   BP: 122/70   Pulse: 68   Temp: 97.6 °F (36.4 °C)   SpO2: 98%       Physical Exam:  GEN: NAD, conversant  EYES: EOMI, PERRL, no conjunctival erythema  NECK: no thyromegaly, full ROM   CV: RRR, no murmurs/rubs/gallops, no peripheral edema  LUNG: CTAB, no wheezes/rales/ronchi  SKIN: no rashes, no acanthosis, has a skin nodules most likely from the triglycerides being elevated  MSK: no deformities, full ROM of all extremities  NEURO: no tremors, DTR normal  PSYCH: AOX3, appropriate mood, affect normal      Results Review:     I reviewed the patient's new clinical results.      Lab Results   Component Value Date    GLUCOSE 279 (H) 05/14/2020    BUN 19 05/14/2020    CREATININE 0.92 05/14/2020    EGFRIFNONA 85 05/14/2020    BCR 20.7 05/14/2020    K 4.9 05/14/2020    CO2 18.9 (L) 05/14/2020    CALCIUM 9.3 05/14/2020    ALBUMIN 4.70 05/14/2020    LABIL2 1.6 02/08/2019    AST 18 05/14/2020    ALT 21 05/14/2020    CHOL 644 (H) 05/14/2020    TRIG >4,425 (H) 05/14/2020    LDL  05/14/2020      Comment:      Unable to calculate    LDL 11 05/14/2020    HDL 6 (L) 05/14/2020     Labs from 5/14/2020 showed an A1c of 8.7%, triglycerides more than 4000, LDL 11, sodium 136, potassium 4.9, chloride 96, CO2 19, glucose 279, BUN 19, creatinine 1.9, AST 18, ALT 21, urine microalbumin creatinine ratio was 227.6.    Labs from January 10,  2020 showed a triglycerides of 3098, A1c 8.6%, TSH 1.53, free T4 1.3, sodium 134, potassium 4.4, chloride 99, CO2 18, glucose 173, BUN 14, creatinine 1.0 and albumin creatinine ratio was 111.    Labs from September 2019 showed a triglyceride level of 7861, A1c 8.1, sodium 123, potassium 4.3, chloride 91, 0 22, glucose 279, BUN 15, creatinine 1.2, AST 25, ALT 18.    Medication Review: Reviewed.       Current Outpatient Medications:   •  amLODIPine (NORVASC) 5 MG tablet, Daily., Disp: , Rfl:   •  aspirin 81 MG tablet, ASPIRIN 81 MG ORAL TABLET, Disp: , Rfl:   •  cholestyramine (QUESTRAN) 4 g packet, CHOLESTYRAMINE 4 GM PACK, Disp: , Rfl:   •  colesevelam (Welchol) 625 MG tablet, Take 2 tablets by mouth., Disp: , Rfl:   •  fenofibrate 160 MG tablet, Daily., Disp: , Rfl:   •  glucagon (GLUCAGON EMERGENCY) 1 MG injection, GLUCAGON EMERGENCY 1 MG KIT, Disp: , Rfl:   •  icosapent ethyl (VASCEPA) 1 g capsule capsule, 2 capsules Every 12 (Twelve) Hours., Disp: , Rfl:   •  Insulin Pen Needle (B-D UF III MINI PEN NEEDLES) 31G X 5 MM misc, tid, Disp: , Rfl:   •  insulin regular (HUMULIN R) 500 UNIT/ML CONCENTRATED injection, 15 in am 12 in the evening, Disp: , Rfl:   •  JARDIANCE 10 MG tablet, TAKE ONE TABLET BY MOUTH DAILY, Disp: 30 tablet, Rfl: 3  •  lisinopril (PRINIVIL,ZESTRIL) 20 MG tablet, Every 12 (Twelve) Hours., Disp: , Rfl:   •  metFORMIN (GLUCOPHAGE) 1000 MG tablet, bid, Disp: , Rfl:   •  metoprolol tartrate (LOPRESSOR) 50 MG tablet, Take 1.5 tablets by mouth 2 (Two) Times a Day., Disp: 270 tablet, Rfl: 3  •  NOVOLOG FLEXPEN 100 UNIT/ML solution pen-injector sc pen, INJECT 13 UNITS SUBCUTANEOUSLY BEFORE BREAKFAST, 13 UNITS BEFORE LUNCH, AND 24 UNITS WITH SUPPER (Patient taking differently: 10 units at  breakfast , 10 units at  lunch ,, 22 units at  supper), Disp: 45 mL, Rfl: 4  •  Red Yeast Rice 500 MG/0.5GM powder, RED YEAST RICE, Disp: , Rfl:   •  rosuvastatin (CRESTOR) 40 MG tablet, CRESTOR 40 MG TABS, Disp: ,  "Rfl:       Assessment/Plan   1.  Diabetes mellitus type II: Uncontrolled with high A1c of 8.7%.  At this time I have asked him to DC NovoLog, change U5 100 insulin 2.15 mL subcu in the morning and 0.10 mL subcu at lunch and supper.  He will continue metformin and Jardiance.  I have increased Jardiance dose to 25 mg p.o. daily.  He will work on his diet and reduce bread intake.  We will follow blood sugars and A1c.  Advised to always give glucose dose in case of low blood sugar.  2.  Severe hypertriglyceridemia: He is currently on Crestor with fenofibrate, Vascepa and cholestyramine.  Praluent did not help. Again advised to decrease bread and starches.  Plasmapheresis is cost prohibitive.  Remains high risk for acute pancreatitis and CAD.  Talked about dietitian consult, he feels like he is well versed and will take care of the diet on his own.  3.  Hypertension: Well-controlled, continue current medication  4.  Hyponatremia: Most likely pseudohyponatremia secondary to severe hypertriglyceridemia.  Now better.  5.  Diabetic nephropathy: On lisinopril.  Blood pressure is doing well.          Rosario Mendenhall MD FACE.      EMR Dragon / transcription disclaimer:     \"Dictated utilizing Dragon dictation\".                 "

## 2020-05-22 NOTE — PATIENT INSTRUCTIONS
DC NovoLog  Change Humulin R U500 insulin to 0.15 mL subcu in the morning half an hour before breakfast, 0.10 mL subcu-hour before lunch and supper  Increase Jardiance to 25 mg p.o. daily  Please cut down your bread intake and starches  Please decrease your red meat, fried food and processed and refined sugars  Follow-up in 3 months with labs  Always give glucose dose in case of low blood sugar  Get regular eye exam and flu vaccine.

## 2020-06-01 RX ORDER — CHOLESTYRAMINE 4 G/9G
POWDER, FOR SUSPENSION ORAL
Qty: 180 PACKET | Refills: 1 | Status: SHIPPED | OUTPATIENT
Start: 2020-06-01 | End: 2021-07-02 | Stop reason: SDUPTHER

## 2020-06-01 RX ORDER — INSULIN HUMAN 500 [IU]/ML
INJECTION, SOLUTION SUBCUTANEOUS
Qty: 60 ML | Refills: 1 | Status: SHIPPED | OUTPATIENT
Start: 2020-06-01 | End: 2020-08-28 | Stop reason: SDUPTHER

## 2020-07-17 ENCOUNTER — OFFICE VISIT (OUTPATIENT)
Dept: CARDIOLOGY | Facility: CLINIC | Age: 55
End: 2020-07-17

## 2020-07-17 VITALS
BODY MASS INDEX: 27.57 KG/M2 | HEIGHT: 73 IN | DIASTOLIC BLOOD PRESSURE: 76 MMHG | OXYGEN SATURATION: 99 % | SYSTOLIC BLOOD PRESSURE: 131 MMHG | HEART RATE: 70 BPM | WEIGHT: 208 LBS

## 2020-07-17 DIAGNOSIS — I10 ESSENTIAL HYPERTENSION: ICD-10-CM

## 2020-07-17 DIAGNOSIS — E11.65 TYPE 2 DIABETES MELLITUS WITH HYPERGLYCEMIA, WITHOUT LONG-TERM CURRENT USE OF INSULIN (HCC): ICD-10-CM

## 2020-07-17 DIAGNOSIS — E78.2 MIXED HYPERLIPIDEMIA: ICD-10-CM

## 2020-07-17 DIAGNOSIS — I25.708 CORONARY ARTERY DISEASE OF BYPASS GRAFT OF NATIVE HEART WITH STABLE ANGINA PECTORIS (HCC): Primary | ICD-10-CM

## 2020-07-17 PROCEDURE — 99214 OFFICE O/P EST MOD 30 MIN: CPT | Performed by: INTERNAL MEDICINE

## 2020-07-17 NOTE — PROGRESS NOTES
"    Subjective:     Encounter Date:07/17/2020      Patient ID: Rodney Chaney is a 55 y.o. male.    Chief Complaint:  History of Present Illness 55-year-old white male with history of coronary status post in place in the past coronary bypass surgery diabetes hypertension hyperlipidemia presents to my office for follow-up.  Patient is currently stable with absence of chest pain or shortness of breath at rest on exertion.  No complains any PND orthopnea.  No palpitation dizziness syncope or swelling of the feet but has been taking his medicines regularly.  He has very high triglycerides and cholesterol and is on oral medicines and followed by endocrinologist.  He is trying to exercise regular and follow a good diet    The following portions of the patient's history were reviewed and updated as appropriate: allergies, current medications, past family history, past medical history, past social history, past surgical history and problem list.  Past Medical History:   Diagnosis Date   • CAD (coronary artery disease)    • Hyperlipidemia    • Hypertension    • Type 2 diabetes mellitus (CMS/HCC)      Past Surgical History:   Procedure Laterality Date   • APPENDECTOMY     • CHOLECYSTECTOMY     • CORONARY ARTERY BYPASS GRAFT       /76 (BP Location: Left arm, Patient Position: Sitting)   Pulse 70   Ht 185.4 cm (73\")   Wt 94.3 kg (208 lb)   SpO2 99%   BMI 27.44 kg/m²   Family History   Problem Relation Age of Onset   • Heart attack Mother    • Alcohol abuse Father        Current Outpatient Medications:   •  amLODIPine (NORVASC) 5 MG tablet, Daily., Disp: , Rfl:   •  aspirin 81 MG tablet, ASPIRIN 81 MG ORAL TABLET, Disp: , Rfl:   •  cholestyramine (QUESTRAN) 4 g packet, MIX 1 PACKET IN LIQUID AND DRINK TWICE DAILY, Disp: 180 packet, Rfl: 1  •  Empagliflozin (Jardiance) 25 MG tablet, Take 25 mg by mouth Daily., Disp: 90 tablet, Rfl: 4  •  fenofibrate 160 MG tablet, Daily., Disp: , Rfl:   •  glucagon (GLUCAGON EMERGENCY) " 1 MG injection, GLUCAGON EMERGENCY 1 MG KIT, Disp: , Rfl:   •  HUMULIN R 500 UNIT/ML CONCENTRATED injection, INJECT 0.15 ML (75 UNITS) SUBCUTANEOUSLY IN MORNING AND 0.12 ML (60 UNITS) IN EVENING, DISCARD VIAL 40 DAYS AFTER FIRST USE , Disp: 60 mL, Rfl: 1  •  icosapent ethyl (VASCEPA) 1 g capsule capsule, 2 capsules Every 12 (Twelve) Hours., Disp: , Rfl:   •  Insulin Pen Needle (B-D UF III MINI PEN NEEDLES) 31G X 5 MM misc, tid, Disp: , Rfl:   •  lisinopril (PRINIVIL,ZESTRIL) 20 MG tablet, Every 12 (Twelve) Hours., Disp: , Rfl:   •  metFORMIN (GLUCOPHAGE) 1000 MG tablet, bid, Disp: , Rfl:   •  metoprolol tartrate (LOPRESSOR) 50 MG tablet, Take 1.5 tablets by mouth 2 (Two) Times a Day., Disp: 270 tablet, Rfl: 3  •  Red Yeast Rice 500 MG/0.5GM powder, RED YEAST RICE, Disp: , Rfl:   •  rosuvastatin (CRESTOR) 40 MG tablet, CRESTOR 40 MG TABS, Disp: , Rfl:   Allergies   Allergen Reactions   • Levofloxacin Unknown (See Comments)     Social History     Socioeconomic History   • Marital status:      Spouse name: Not on file   • Number of children: Not on file   • Years of education: Not on file   • Highest education level: Not on file   Tobacco Use   • Smoking status: Never Smoker   • Smokeless tobacco: Never Used   Substance and Sexual Activity   • Alcohol use: No     Frequency: Never   • Drug use: Defer   • Sexual activity: Defer     Review of Systems   Constitution: Negative for fever and malaise/fatigue.   HENT: Negative for ear pain and nosebleeds.    Eyes: Negative for blurred vision and double vision.   Cardiovascular: Negative for chest pain, dyspnea on exertion and palpitations.   Respiratory: Negative for cough and shortness of breath.    Skin: Negative for rash.   Musculoskeletal: Negative for joint pain.   Gastrointestinal: Negative for abdominal pain, nausea and vomiting.   Neurological: Positive for numbness. Negative for focal weakness and headaches.   Psychiatric/Behavioral: Negative for depression.  The patient is not nervous/anxious.    All other systems reviewed and are negative.             Objective:     Physical Exam   Constitutional: He appears well-developed and well-nourished.   HENT:   Head: Normocephalic and atraumatic.   Eyes: Pupils are equal, round, and reactive to light. Conjunctivae and EOM are normal. No scleral icterus.   Neck: Normal range of motion. Neck supple. No JVD present. Carotid bruit is not present.   Cardiovascular: Normal rate, regular rhythm, S1 normal, S2 normal, normal heart sounds and intact distal pulses. PMI is not displaced.   Pulmonary/Chest: Effort normal and breath sounds normal. He has no wheezes. He has no rales.   Abdominal: Soft. Bowel sounds are normal.   Musculoskeletal: Normal range of motion.   Neurological: He is alert. He has normal strength.   No focal deficits   Skin: Skin is warm and dry. No rash noted.   Psychiatric: He has a normal mood and affect.     Procedures    Lab Review:       Assessment:          Diagnosis Plan   1. Coronary artery disease of bypass graft of native heart with stable angina pectoris (CMS/[facility])     2. Essential hypertension     3. Mixed hyperlipidemia     4. Type 2 diabetes mellitus with hyperglycemia, without long-term current use of insulin (CMS/[facility])            Plan:       Patient has history of coronary status post carotid bypass surgery and also stent placed in the past and is currently stable on medications  Patient's blood pressure and heart rate stable  Patient's lipids are followed by the primary care doctor and also endocrinologist and his triglycerides are very high along with his total cholesterol  Patient may benefit from Repatha will discuss with endocrinologist.  Patient sugar levels are followed by the endocrinologist also.

## 2020-07-27 RX ORDER — ICOSAPENT ETHYL 1000 MG/1
CAPSULE ORAL
Qty: 360 CAPSULE | Refills: 2 | Status: SHIPPED | OUTPATIENT
Start: 2020-07-27 | End: 2021-02-18 | Stop reason: SDUPTHER

## 2020-08-21 ENCOUNTER — LAB (OUTPATIENT)
Dept: LAB | Facility: HOSPITAL | Age: 55
End: 2020-08-21

## 2020-08-21 DIAGNOSIS — E11.65 TYPE 2 DIABETES MELLITUS WITH HYPERGLYCEMIA, WITHOUT LONG-TERM CURRENT USE OF INSULIN (HCC): ICD-10-CM

## 2020-08-21 LAB
ALBUMIN SERPL-MCNC: 4.5 G/DL (ref 3.5–5.2)
ALBUMIN UR-MCNC: 5.6 MG/DL
ALBUMIN/GLOB SERPL: 1.6 G/DL
ALP SERPL-CCNC: 46 U/L (ref 39–117)
ALT SERPL W P-5'-P-CCNC: 21 U/L (ref 1–41)
ANION GAP SERPL CALCULATED.3IONS-SCNC: 17.6 MMOL/L (ref 5–15)
ARTICHOKE IGE QN: 70 MG/DL (ref 0–100)
AST SERPL-CCNC: 27 U/L (ref 1–40)
BILIRUB SERPL-MCNC: 0.3 MG/DL (ref 0–1.2)
BUN SERPL-MCNC: 16 MG/DL (ref 6–20)
BUN/CREAT SERPL: 17.6 (ref 7–25)
CALCIUM SPEC-SCNC: 9.3 MG/DL (ref 8.6–10.5)
CHLORIDE SERPL-SCNC: 97 MMOL/L (ref 98–107)
CHOLEST SERPL-MCNC: 557 MG/DL (ref 0–200)
CO2 SERPL-SCNC: 17.4 MMOL/L (ref 22–29)
CREAT SERPL-MCNC: 0.91 MG/DL (ref 0.76–1.27)
CREAT UR-MCNC: 38.1 MG/DL
GFR SERPL CREATININE-BSD FRML MDRD: 86 ML/MIN/1.73
GLOBULIN UR ELPH-MCNC: 2.8 GM/DL
GLUCOSE SERPL-MCNC: 268 MG/DL (ref 65–99)
HBA1C MFR BLD: 7.9 % (ref 3.5–5.6)
HDLC SERPL-MCNC: ABNORMAL MG/DL
LDLC SERPL CALC-MCNC: ABNORMAL MG/DL
LDLC/HDLC SERPL: ABNORMAL {RATIO}
MICROALBUMIN/CREAT UR: 147 MG/G
POTASSIUM SERPL-SCNC: 4.5 MMOL/L (ref 3.5–5.2)
PROT SERPL-MCNC: 7.3 G/DL (ref 6–8.5)
SODIUM SERPL-SCNC: 132 MMOL/L (ref 136–145)
TRIGL SERPL-MCNC: >4425 MG/DL (ref 0–150)
VLDLC SERPL-MCNC: ABNORMAL MG/DL

## 2020-08-21 PROCEDURE — 80061 LIPID PANEL: CPT

## 2020-08-21 PROCEDURE — 36415 COLL VENOUS BLD VENIPUNCTURE: CPT

## 2020-08-21 PROCEDURE — 82570 ASSAY OF URINE CREATININE: CPT

## 2020-08-21 PROCEDURE — 82043 UR ALBUMIN QUANTITATIVE: CPT

## 2020-08-21 PROCEDURE — 83036 HEMOGLOBIN GLYCOSYLATED A1C: CPT

## 2020-08-21 PROCEDURE — 80053 COMPREHEN METABOLIC PANEL: CPT

## 2020-08-21 PROCEDURE — 83721 ASSAY OF BLOOD LIPOPROTEIN: CPT

## 2020-08-28 ENCOUNTER — OFFICE VISIT (OUTPATIENT)
Dept: ENDOCRINOLOGY | Facility: CLINIC | Age: 55
End: 2020-08-28

## 2020-08-28 VITALS
TEMPERATURE: 97.1 F | HEART RATE: 67 BPM | HEIGHT: 73 IN | OXYGEN SATURATION: 98 % | BODY MASS INDEX: 27.17 KG/M2 | DIASTOLIC BLOOD PRESSURE: 70 MMHG | SYSTOLIC BLOOD PRESSURE: 130 MMHG | WEIGHT: 205 LBS

## 2020-08-28 DIAGNOSIS — E78.1 PURE HYPERTRIGLYCERIDEMIA: ICD-10-CM

## 2020-08-28 DIAGNOSIS — E11.65 TYPE 2 DIABETES MELLITUS WITH HYPERGLYCEMIA, WITHOUT LONG-TERM CURRENT USE OF INSULIN (HCC): Primary | ICD-10-CM

## 2020-08-28 DIAGNOSIS — I10 ESSENTIAL (PRIMARY) HYPERTENSION: ICD-10-CM

## 2020-08-28 DIAGNOSIS — E16.2 HYPOGLYCEMIA: ICD-10-CM

## 2020-08-28 DIAGNOSIS — E11.21 DIABETIC NEPHROPATHY ASSOCIATED WITH TYPE 2 DIABETES MELLITUS (HCC): ICD-10-CM

## 2020-08-28 LAB
GLUCOSE BLDC GLUCOMTR-MCNC: 145 MG/DL (ref 70–105)
GLUCOSE BLDC GLUCOMTR-MCNC: 145 MG/DL (ref 70–130)

## 2020-08-28 PROCEDURE — 82962 GLUCOSE BLOOD TEST: CPT | Performed by: INTERNAL MEDICINE

## 2020-08-28 PROCEDURE — 99214 OFFICE O/P EST MOD 30 MIN: CPT | Performed by: INTERNAL MEDICINE

## 2020-08-28 NOTE — PROGRESS NOTES
Endocrine Progress Note Outpatient     Patient Care Team:  Prabhu Downing MD as PCP - General  Prabhu Downing MD as PCP - Family Medicine  Ham Jacinto MD as Consulting Physician (Cardiology)    Chief Complaint: Follow up type 2 diabetes    HPI: 55-year-old male with history of type 2 diabetes, hypertension, hyperlipidemia specifically hypertriglyceridemia is here for follow-up.    For type 2 diabetes currently on metformin 1000 twice a day, U 500 insulin 0.15 mL subcu in the morning and 0.10 mL subcu before lunch 0.10 mL subcu before supper along with Jardiance 10 mg po daily.  He tells me that he did email me the blood sugar records unfortunately has not received them yet.  His main problem is the starches especially bread.  He is not able to control diet.  Does not drink alcohol.  He is trying to control his fat/starches intake.  He is also using some artificial sweeteners.    Hypertension: Well-controlled    Hyperlipidemia-hypertriglyceridemia: He is currently on Crestor with fenofibrate and cholestyramine and with Vascepa.  We tried Praluent however he is not taking that at this time, we also referred him to a nephrologist for plasmapheresis for triglycerides however he tells me that this is cost prohibitive.  Does have history of pancreatitis.  No episode of pancreatitis since last seen in May 2020.    Past Medical History:   Diagnosis Date   • CAD (coronary artery disease)    • Hyperlipidemia    • Hypertension    • Type 2 diabetes mellitus (CMS/[facility])        Social History     Socioeconomic History   • Marital status:      Spouse name: Not on file   • Number of children: Not on file   • Years of education: Not on file   • Highest education level: Not on file   Tobacco Use   • Smoking status: Never Smoker   • Smokeless tobacco: Never Used   Substance and Sexual Activity   • Alcohol use: No     Frequency: Never   • Drug use: Defer   • Sexual activity: Defer       Family History   Problem Relation Age of  Onset   • Heart attack Mother    • Alcohol abuse Father        Allergies   Allergen Reactions   • Levofloxacin Unknown (See Comments)       ROS:   Constitutional:  Admit fatigue, tiredness.    Eyes:  Denies change in visual acuity   HENT:  Denies nasal congestion or sore throat   Respiratory: denies cough, shortness of breath.   Cardiovascular:  denies chest pain, edema   GI:  Denies abdominal pain, nausea, vomiting.   Musculoskeletal:  Denies back pain or joint pain   Integument:  Denies dry skin and rash   Neurologic:  Denies headache, focal weakness or sensory changes   Endocrine:  Denies polyuria or polydipsia   Psychiatric:  Denies depression or anxiety      Vitals:    08/28/20 1055   BP: 130/70   Pulse: 67   Temp: 97.1 °F (36.2 °C)   SpO2: 98%       Physical Exam:  GEN: NAD, conversant  EYES: EOMI, PERRL, no conjunctival erythema  NECK: no thyromegaly, full ROM   CV: RRR, no murmurs/rubs/gallops, no peripheral edema  LUNG: CTAB, no wheezes/rales/ronchi  SKIN: no rashes, no acanthosis, has a skin nodules most likely from the triglycerides being elevated  MSK: no deformities, full ROM of all extremities  NEURO: no tremors, DTR normal  PSYCH: AOX3, appropriate mood, affect normal      Results Review:     I reviewed the patient's new clinical results.      Lab Results   Component Value Date    GLUCOSE 268 (H) 08/21/2020    BUN 16 08/21/2020    CREATININE 0.91 08/21/2020    EGFRIFNONA 86 08/21/2020    BCR 17.6 08/21/2020    K 4.5 08/21/2020    CO2 17.4 (L) 08/21/2020    CALCIUM 9.3 08/21/2020    ALBUMIN 4.50 08/21/2020    LABIL2 1.6 02/08/2019    AST 27 08/21/2020    ALT 21 08/21/2020    CHOL 557 (H) 08/21/2020    TRIG >4,425 (H) 08/21/2020    LDL  08/21/2020      Comment:      Unable to calculate    LDL 70 08/21/2020    HDL  08/21/2020      Comment:      Unable to perform due to elevated triglyceride.      Labs from August 21, 2020 showed an A1c of 7.9, LDL 70, triglycerides more than 4000, sodium 132, potassium  4.5, chloride 97, CO2 17, glucose 268, BUN 16 and creatinine 1.9 with AST 27 and ALT 21 and albumin creatinine ratio was 147.    Labs from 5/14/2020 showed an A1c of 8.7%, triglycerides more than 4000, LDL 11, sodium 136, potassium 4.9, chloride 96, CO2 19, glucose 279, BUN 19, creatinine 1.9, AST 18, ALT 21, urine microalbumin creatinine ratio was 227.6.    Labs from January 10, 2020 showed a triglycerides of 3098, A1c 8.6%, TSH 1.53, free T4 1.3, sodium 134, potassium 4.4, chloride 99, CO2 18, glucose 173, BUN 14, creatinine 1.0 and albumin creatinine ratio was 111.    Labs from September 2019 showed a triglyceride level of 7861, A1c 8.1, sodium 123, potassium 4.3, chloride 91, 0 22, glucose 279, BUN 15, creatinine 1.2, AST 25, ALT 18.    Medication Review: Reviewed.       Current Outpatient Medications:   •  amLODIPine (NORVASC) 5 MG tablet, Daily., Disp: , Rfl:   •  aspirin 81 MG tablet, ASPIRIN 81 MG ORAL TABLET, Disp: , Rfl:   •  cholestyramine (QUESTRAN) 4 g packet, MIX 1 PACKET IN LIQUID AND DRINK TWICE DAILY, Disp: 180 packet, Rfl: 1  •  Empagliflozin (Jardiance) 25 MG tablet, Take 25 mg by mouth Daily., Disp: 90 tablet, Rfl: 4  •  fenofibrate 160 MG tablet, Daily., Disp: , Rfl:   •  glucagon (GLUCAGON EMERGENCY) 1 MG injection, GLUCAGON EMERGENCY 1 MG KIT, Disp: , Rfl:   •  HUMULIN R 500 UNIT/ML CONCENTRATED injection, INJECT 0.15 ML (75 UNITS) SUBCUTANEOUSLY IN MORNING AND 0.12 ML (60 UNITS) IN EVENING, DISCARD VIAL 40 DAYS AFTER FIRST USE  (Patient taking differently: 15 units brekfest  and 10 units lunch then 10 units at supper), Disp: 60 mL, Rfl: 1  •  Insulin Pen Needle (B-D UF III MINI PEN NEEDLES) 31G X 5 MM misc, tid, Disp: , Rfl:   •  lisinopril (PRINIVIL,ZESTRIL) 20 MG tablet, Every 12 (Twelve) Hours., Disp: , Rfl:   •  metFORMIN (GLUCOPHAGE) 1000 MG tablet, bid, Disp: , Rfl:   •  metoprolol tartrate (LOPRESSOR) 50 MG tablet, Take 1.5 tablets by mouth 2 (Two) Times a Day., Disp: 270 tablet, Rfl:  "3  •  Red Yeast Rice 500 MG/0.5GM powder, RED YEAST RICE, Disp: , Rfl:   •  rosuvastatin (CRESTOR) 40 MG tablet, CRESTOR 40 MG TABS, Disp: , Rfl:   •  VASCEPA 1 g capsule capsule, TAKE 2 CAPSULES TWICE DAILY, Disp: 360 capsule, Rfl: 2      Assessment/Plan   1.  Diabetes mellitus type II: Uncontrolled with high A1c of 7.9%.  At this time I have asked him to change U500 insulin to 0.18 mL subcu in the morning and 0.10 mL subcu at lunch and 0.08 ml sq ac supper.  He is advised to take insulin at least 30 minutes before he eats.  He will continue metformin and Jardiance. He will work on his diet and reduce bread intake.  We will follow blood sugars and A1c.  Advised to always give glucose dose in case of low blood sugar.    2.  Severe hypertriglyceridemia: He is currently on Crestor with fenofibrate, Vascepa and cholestyramine.  Praluent did not help. Again advised to decrease bread and starches.  Plasmapheresis is cost prohibitive.  Remains high risk for acute pancreatitis and CAD.  Talked about dietitian consult, he feels like he is well versed and will take care of the diet on his own.    3.  Hypertension: Well-controlled, continue current medication    4.  Hyponatremia: Most likely pseudohyponatremia secondary to severe hypertriglyceridemia.     5.  Diabetic nephropathy: On lisinopril.  Blood pressure is doing well.          Rosario Mendenhall MD FACE.      EMR Dragon / transcription disclaimer:     \"Dictated utilizing Dragon dictation\".                 "

## 2020-08-28 NOTE — PATIENT INSTRUCTIONS
Change Humulin R U500 insulin to 0.18 mL subcu 30 minutes before breakfast, 0. 10 mL subcu 30 minutes before lunch and 0.08 mL subcu 30 minutes before supper  Please work on your diet, decrease starches and artificial sweeteners  Always keep glucose source in case of low blood sugar  Annual eye exam  Follow-up in 6 months with labs.

## 2020-11-20 RX ORDER — AMLODIPINE BESYLATE 5 MG/1
5 TABLET ORAL EVERY 24 HOURS
Qty: 90 TABLET | Refills: 3 | Status: SHIPPED | OUTPATIENT
Start: 2020-11-20 | End: 2021-07-02 | Stop reason: SDUPTHER

## 2021-01-07 RX ORDER — METOPROLOL TARTRATE 50 MG/1
TABLET, FILM COATED ORAL
Qty: 270 TABLET | Refills: 2 | Status: SHIPPED | OUTPATIENT
Start: 2021-01-07 | End: 2021-07-02 | Stop reason: SDUPTHER

## 2021-02-12 ENCOUNTER — OFFICE VISIT (OUTPATIENT)
Dept: CARDIOLOGY | Facility: CLINIC | Age: 56
End: 2021-02-12

## 2021-02-12 ENCOUNTER — LAB (OUTPATIENT)
Dept: LAB | Facility: HOSPITAL | Age: 56
End: 2021-02-12

## 2021-02-12 VITALS
TEMPERATURE: 97.8 F | HEIGHT: 73 IN | DIASTOLIC BLOOD PRESSURE: 74 MMHG | SYSTOLIC BLOOD PRESSURE: 169 MMHG | HEART RATE: 85 BPM | OXYGEN SATURATION: 100 % | BODY MASS INDEX: 26.37 KG/M2 | WEIGHT: 199 LBS

## 2021-02-12 DIAGNOSIS — I10 ESSENTIAL HYPERTENSION: ICD-10-CM

## 2021-02-12 DIAGNOSIS — E78.1 PURE HYPERTRIGLYCERIDEMIA: ICD-10-CM

## 2021-02-12 DIAGNOSIS — Z12.5 ENCOUNTER FOR SCREENING FOR MALIGNANT NEOPLASM OF PROSTATE: Primary | ICD-10-CM

## 2021-02-12 DIAGNOSIS — E11.65 TYPE 2 DIABETES MELLITUS WITH HYPERGLYCEMIA, WITHOUT LONG-TERM CURRENT USE OF INSULIN (HCC): ICD-10-CM

## 2021-02-12 DIAGNOSIS — I10 ESSENTIAL (PRIMARY) HYPERTENSION: ICD-10-CM

## 2021-02-12 DIAGNOSIS — E78.2 MIXED HYPERLIPIDEMIA: ICD-10-CM

## 2021-02-12 DIAGNOSIS — I25.708 CORONARY ARTERY DISEASE OF BYPASS GRAFT OF NATIVE HEART WITH STABLE ANGINA PECTORIS (HCC): Primary | ICD-10-CM

## 2021-02-12 LAB
ALBUMIN UR-MCNC: 7.8 MG/DL
ARTICHOKE IGE QN: 32 MG/DL (ref 0–100)
CHOLEST SERPL-MCNC: 386 MG/DL (ref 0–200)
CREAT UR-MCNC: 55.6 MG/DL
HBA1C MFR BLD: 9.3 % (ref 3.5–5.6)
HDLC SERPL-MCNC: 7 MG/DL (ref 40–60)
LDLC SERPL CALC-MCNC: ABNORMAL MG/DL
LDLC/HDLC SERPL: ABNORMAL {RATIO}
MICROALBUMIN/CREAT UR: 140.3 MG/G
PSA SERPL-MCNC: 0.4 NG/ML (ref 0–4)
TRIGL SERPL-MCNC: 4026 MG/DL (ref 0–150)
TSH SERPL DL<=0.05 MIU/L-ACNC: 1.17 UIU/ML (ref 0.27–4.2)
VLDLC SERPL-MCNC: ABNORMAL MG/DL

## 2021-02-12 PROCEDURE — 83721 ASSAY OF BLOOD LIPOPROTEIN: CPT

## 2021-02-12 PROCEDURE — 84153 ASSAY OF PSA TOTAL: CPT

## 2021-02-12 PROCEDURE — 82043 UR ALBUMIN QUANTITATIVE: CPT

## 2021-02-12 PROCEDURE — 84443 ASSAY THYROID STIM HORMONE: CPT

## 2021-02-12 PROCEDURE — 83036 HEMOGLOBIN GLYCOSYLATED A1C: CPT

## 2021-02-12 PROCEDURE — 82570 ASSAY OF URINE CREATININE: CPT

## 2021-02-12 PROCEDURE — 36415 COLL VENOUS BLD VENIPUNCTURE: CPT

## 2021-02-12 PROCEDURE — 99214 OFFICE O/P EST MOD 30 MIN: CPT | Performed by: INTERNAL MEDICINE

## 2021-02-12 PROCEDURE — 80053 COMPREHEN METABOLIC PANEL: CPT

## 2021-02-12 PROCEDURE — 80061 LIPID PANEL: CPT

## 2021-02-12 NOTE — PROGRESS NOTES
"    Subjective:     Encounter Date:02/12/2021      Patient ID: Rodney Chaney is a 55 y.o. male.    Chief Complaint:  History of Present Illness 54-year-old white male with history of coronary status post current bypass surgery and stent placement in the past history of diabetes hypertension hyperlipidemia presents to my office for follow-up.  Patient is currently stable without any symptoms of chest pain or shortness of breath at rest on exertion.  No complains of any PND orthopnea.  No palpitation dizziness syncope or swelling of the feet.  Patient has been taking all her medicines regularly.  Patient does not smoke.  Patient is trying to exercise regularly patient follows a good diet    The following portions of the patient's history were reviewed and updated as appropriate: allergies, current medications, past family history, past medical history, past social history, past surgical history and problem list.  Past Medical History:   Diagnosis Date   • CAD (coronary artery disease)    • Hyperlipidemia    • Hypertension    • Type 2 diabetes mellitus (CMS/HCC)      Past Surgical History:   Procedure Laterality Date   • APPENDECTOMY     • CHOLECYSTECTOMY     • CORONARY ARTERY BYPASS GRAFT       /74   Pulse 85   Temp 97.8 °F (36.6 °C)   Ht 185.4 cm (73\")   Wt 90.3 kg (199 lb)   SpO2 100%   BMI 26.25 kg/m²   Family History   Problem Relation Age of Onset   • Heart attack Mother    • Alcohol abuse Father        Current Outpatient Medications:   •  amLODIPine (NORVASC) 5 MG tablet, Take 1 tablet by mouth Daily., Disp: 90 tablet, Rfl: 3  •  aspirin 81 MG tablet, ASPIRIN 81 MG ORAL TABLET, Disp: , Rfl:   •  cholestyramine (QUESTRAN) 4 g packet, MIX 1 PACKET IN LIQUID AND DRINK TWICE DAILY, Disp: 180 packet, Rfl: 1  •  Empagliflozin (Jardiance) 25 MG tablet, Take 25 mg by mouth Daily., Disp: 90 tablet, Rfl: 4  •  fenofibrate 160 MG tablet, Daily., Disp: , Rfl:   •  glucagon (GLUCAGON EMERGENCY) 1 MG injection, " GLUCAGON EMERGENCY 1 MG KIT, Disp: , Rfl:   •  Insulin Pen Needle (B-D UF III MINI PEN NEEDLES) 31G X 5 MM misc, tid, Disp: , Rfl:   •  insulin regular (HUMULIN R) 500 UNIT/ML CONCENTRATED injection, Inject 0.18 mL subcu 30 minutes before breakfast, 0. 10 mL subcu 30 minutes before lunch and 0.08 mL subcu 30 minutes before supper, Disp: 60 mL, Rfl: 1  •  metFORMIN (GLUCOPHAGE) 1000 MG tablet, bid, Disp: , Rfl:   •  metoprolol tartrate (LOPRESSOR) 50 MG tablet, TAKE ONE AND ONE-HALF TABLET BY MOUTH TWICE A DAY, Disp: 270 tablet, Rfl: 2  •  Red Yeast Rice 500 MG/0.5GM powder, RED YEAST RICE, Disp: , Rfl:   •  rosuvastatin (CRESTOR) 40 MG tablet, CRESTOR 40 MG TABS, Disp: , Rfl:   •  VASCEPA 1 g capsule capsule, TAKE 2 CAPSULES TWICE DAILY, Disp: 360 capsule, Rfl: 2  Allergies   Allergen Reactions   • Levofloxacin Unknown (See Comments)     Social History     Socioeconomic History   • Marital status:      Spouse name: Not on file   • Number of children: Not on file   • Years of education: Not on file   • Highest education level: Not on file   Tobacco Use   • Smoking status: Never Smoker   • Smokeless tobacco: Never Used   Substance and Sexual Activity   • Alcohol use: No     Frequency: Never   • Drug use: Defer   • Sexual activity: Defer     Review of Systems   Constitution: Negative for fever and malaise/fatigue.   Cardiovascular: Negative for chest pain, dyspnea on exertion, leg swelling and palpitations.   Respiratory: Negative for cough and shortness of breath.    Skin: Negative for rash.   Gastrointestinal: Negative for abdominal pain, nausea and vomiting.   Neurological: Negative for focal weakness, headaches, light-headedness and numbness.   All other systems reviewed and are negative.             Objective:     Constitutional:       Appearance: Well-developed.   Eyes:      General: No scleral icterus.     Conjunctiva/sclera: Conjunctivae normal.   HENT:      Head: Normocephalic and atraumatic.   Neck:       Musculoskeletal: Normal range of motion and neck supple.      Vascular: No carotid bruit or JVD.   Pulmonary:      Effort: Pulmonary effort is normal.      Breath sounds: Normal breath sounds. No wheezing. No rales.   Cardiovascular:      Normal rate. Regular rhythm.   Pulses:     Intact distal pulses.   Abdominal:      General: Bowel sounds are normal.      Palpations: Abdomen is soft.   Skin:     General: Skin is warm and dry.      Findings: No rash.   Neurological:      Mental Status: Alert.       Procedures    Lab Review:         MDM  1.  Coronary artery disease  Patient had coronary bypass surgery x4 vessels with a LIMA to LAD and saphenous graft to diagonal marginal branch and RCA and also had stent placement to the saphenous graft to right coronary artery in the past  Patient has normal function is currently stable  2.  Hypertension  Patient blood pressure is currently stable on medications although systolic blood pressure slightly high  3.  Hyperlipidemia  Patient is currently on statins fenofibrate and Vascepa  Patient had very high cholesterol and very high triglycerides and is followed by endocrinologist  4.  Diabetes  Patient is currently on insulin as well as oral medicines and is followed by the endocrinologist

## 2021-02-13 LAB
ALBUMIN SERPL-MCNC: 4.4 G/DL (ref 3.5–5.2)
ALBUMIN/GLOB SERPL: 2.2 G/DL
ALP SERPL-CCNC: 70 U/L (ref 39–117)
ALT SERPL W P-5'-P-CCNC: 17 U/L (ref 1–41)
ANION GAP SERPL CALCULATED.3IONS-SCNC: 14.9 MMOL/L (ref 5–15)
AST SERPL-CCNC: 15 U/L (ref 1–40)
BILIRUB SERPL-MCNC: 0.3 MG/DL (ref 0–1.2)
BUN SERPL-MCNC: 17 MG/DL (ref 6–20)
BUN/CREAT SERPL: 21.3 (ref 7–25)
CALCIUM SPEC-SCNC: 8.7 MG/DL (ref 8.6–10.5)
CHLORIDE SERPL-SCNC: 101 MMOL/L (ref 98–107)
CO2 SERPL-SCNC: 22.1 MMOL/L (ref 22–29)
CREAT SERPL-MCNC: 0.8 MG/DL (ref 0.76–1.27)
GFR SERPL CREATININE-BSD FRML MDRD: 100 ML/MIN/1.73
GLOBULIN UR ELPH-MCNC: 2 GM/DL
GLUCOSE SERPL-MCNC: 169 MG/DL (ref 65–99)
POTASSIUM SERPL-SCNC: 4.6 MMOL/L (ref 3.5–5.2)
PROT SERPL-MCNC: 6.4 G/DL (ref 6–8.5)
SODIUM SERPL-SCNC: 138 MMOL/L (ref 136–145)

## 2021-02-18 RX ORDER — EMPAGLIFLOZIN 25 MG/1
25 TABLET, FILM COATED ORAL DAILY
Qty: 30 TABLET | Refills: 12 | Status: SHIPPED | OUTPATIENT
Start: 2021-02-18 | End: 2021-07-02 | Stop reason: SDUPTHER

## 2021-02-18 RX ORDER — ICOSAPENT ETHYL 1000 MG/1
2 CAPSULE ORAL 2 TIMES DAILY
Qty: 120 CAPSULE | Refills: 12 | Status: SHIPPED | OUTPATIENT
Start: 2021-02-18 | End: 2021-07-02

## 2021-02-18 RX ORDER — INSULIN HUMAN 500 [IU]/ML
INJECTION, SOLUTION SUBCUTANEOUS
Qty: 20 ML | Refills: 12 | Status: SHIPPED | OUTPATIENT
Start: 2021-02-18 | End: 2021-07-02 | Stop reason: SDUPTHER

## 2021-02-25 ENCOUNTER — TELEPHONE (OUTPATIENT)
Dept: ENDOCRINOLOGY | Facility: CLINIC | Age: 56
End: 2021-02-25

## 2021-03-15 ENCOUNTER — TELEPHONE (OUTPATIENT)
Dept: ENDOCRINOLOGY | Facility: CLINIC | Age: 56
End: 2021-03-15

## 2021-03-15 NOTE — TELEPHONE ENCOUNTER
Patient called stating his Jardiance is not going to be covered by insurance. Advised him to contact insurance to see what is on the formulary and then let us know and we will send in a replacement. He states he will call insurance this afternoon

## 2021-03-16 NOTE — TELEPHONE ENCOUNTER
Patient called back and said insurance says it will cover it but needs a PA to let them know he has been on the metformin.

## 2021-07-02 ENCOUNTER — OFFICE VISIT (OUTPATIENT)
Dept: ENDOCRINOLOGY | Facility: CLINIC | Age: 56
End: 2021-07-02

## 2021-07-02 VITALS
HEART RATE: 72 BPM | HEIGHT: 73 IN | DIASTOLIC BLOOD PRESSURE: 70 MMHG | OXYGEN SATURATION: 96 % | WEIGHT: 200 LBS | TEMPERATURE: 97.6 F | SYSTOLIC BLOOD PRESSURE: 110 MMHG | BODY MASS INDEX: 26.51 KG/M2

## 2021-07-02 DIAGNOSIS — E11.65 TYPE 2 DIABETES MELLITUS WITH HYPERGLYCEMIA, WITHOUT LONG-TERM CURRENT USE OF INSULIN (HCC): Primary | ICD-10-CM

## 2021-07-02 DIAGNOSIS — E78.1 PURE HYPERTRIGLYCERIDEMIA: ICD-10-CM

## 2021-07-02 DIAGNOSIS — I10 ESSENTIAL (PRIMARY) HYPERTENSION: ICD-10-CM

## 2021-07-02 DIAGNOSIS — E11.21 DIABETIC NEPHROPATHY ASSOCIATED WITH TYPE 2 DIABETES MELLITUS (HCC): ICD-10-CM

## 2021-07-02 PROCEDURE — 99214 OFFICE O/P EST MOD 30 MIN: CPT | Performed by: INTERNAL MEDICINE

## 2021-07-02 RX ORDER — INSULIN HUMAN 500 [IU]/ML
INJECTION, SOLUTION SUBCUTANEOUS
Qty: 20 ML | Refills: 12 | Status: SHIPPED | OUTPATIENT
Start: 2021-07-02 | End: 2022-01-07 | Stop reason: SDUPTHER

## 2021-07-02 RX ORDER — AMLODIPINE BESYLATE 5 MG/1
5 TABLET ORAL EVERY 24 HOURS
Qty: 90 TABLET | Refills: 3 | Status: SHIPPED | OUTPATIENT
Start: 2021-07-02 | End: 2022-08-04

## 2021-07-02 RX ORDER — SYRINGE-NEEDLE,INSULIN,0.5 ML 27GX1/2"
SYRINGE, EMPTY DISPOSABLE MISCELLANEOUS
Qty: 100 EACH | Refills: 6 | Status: SHIPPED | OUTPATIENT
Start: 2021-07-02 | End: 2021-10-12 | Stop reason: SDUPTHER

## 2021-07-02 RX ORDER — FENOFIBRATE 160 MG/1
160 TABLET ORAL DAILY
Qty: 90 TABLET | Refills: 4 | Status: SHIPPED | OUTPATIENT
Start: 2021-07-02 | End: 2022-08-04

## 2021-07-02 RX ORDER — METOPROLOL TARTRATE 50 MG/1
75 TABLET, FILM COATED ORAL 2 TIMES DAILY
Qty: 270 TABLET | Refills: 6 | Status: SHIPPED | OUTPATIENT
Start: 2021-07-02 | End: 2022-07-06

## 2021-07-02 RX ORDER — CHOLESTYRAMINE LIGHT 4 G/5.7G
POWDER, FOR SUSPENSION ORAL
COMMUNITY
Start: 2021-04-02 | End: 2021-07-02 | Stop reason: SDUPTHER

## 2021-07-02 RX ORDER — CHOLESTYRAMINE LIGHT 4 G/5.7G
4 POWDER, FOR SUSPENSION ORAL 2 TIMES DAILY WITH MEALS
Qty: 180 G | Refills: 6 | Status: SHIPPED | OUTPATIENT
Start: 2021-07-02 | End: 2022-06-24 | Stop reason: SDUPTHER

## 2021-07-02 RX ORDER — ROSUVASTATIN CALCIUM 40 MG/1
40 TABLET, COATED ORAL DAILY
Qty: 90 TABLET | Refills: 4 | Status: SHIPPED | OUTPATIENT
Start: 2021-07-02

## 2021-07-02 NOTE — PATIENT INSTRUCTIONS
Change insulin U500 to 0.20 mL subcu in the morning and continue 0.10 mL in the lunch and 0.12 mL at supper.  Continue to work on your diet and activity  Always keep glucose source in case of low blood sugar  Annual eye exam and flu vaccine  Labs before follow-up.

## 2021-07-02 NOTE — PROGRESS NOTES
Endocrine Progress Note Outpatient     Patient Care Team:  Prabhu Downing MD as PCP - General  Prabhu Downing MD as PCP - Family Medicine  Ham Jacinto MD as Consulting Physician (Cardiology)    Chief Complaint: Follow up type 2 diabetes    HPI: 56-year-old male with history of type 2 diabetes, hypertension, hyperlipidemia specifically hypertriglyceridemia is here for follow-up.    For type 2 diabetes currently on metformin 1000 twice a day, U 500 insulin 0.17 mL subcu in the morning and 0.10 mL subcu before lunch 0.12 mL subcu before supper along with Jardiance 10 mg po daily.  He tells me that he did email me the blood sugar records unfortunately has not received them yet.  His main problem is the starches especially bread.  He is not able to control diet.  Does not drink alcohol.  He is trying to control his fat/starches intake.  He is also using some artificial sweeteners.    Hypertension: Well-controlled    Hyperlipidemia-hypertriglyceridemia: He is currently on Crestor with fenofibrate and cholestyramine and with Vascepa.  We tried Praluent however he is not taking that at this time, we also referred him to a nephrologist for plasmapheresis for triglycerides however he tells me that this is cost prohibitive.  Does have history of pancreatitis.  No episode of pancreatitis since last seen in May 2020.    Past Medical History:   Diagnosis Date   • CAD (coronary artery disease)    • Hyperlipidemia    • Hypertension    • Type 2 diabetes mellitus (CMS/[facility])        Social History     Socioeconomic History   • Marital status:      Spouse name: Not on file   • Number of children: Not on file   • Years of education: Not on file   • Highest education level: Not on file   Tobacco Use   • Smoking status: Never Smoker   • Smokeless tobacco: Never Used   Vaping Use   • Vaping Use: Never used   Substance and Sexual Activity   • Alcohol use: No   • Drug use: Defer   • Sexual activity: Defer       Family History    Problem Relation Age of Onset   • Heart attack Mother    • Alcohol abuse Father        Allergies   Allergen Reactions   • Levofloxacin Unknown (See Comments)       ROS:   Constitutional:  Admit fatigue, tiredness.    Eyes:  Denies change in visual acuity   HENT:  Denies nasal congestion or sore throat   Respiratory: denies cough, shortness of breath.   Cardiovascular:  denies chest pain, edema   GI:  Denies abdominal pain, nausea, vomiting.   Musculoskeletal:  Denies back pain or joint pain   Integument:  Denies dry skin and rash   Neurologic:  Denies headache, focal weakness or sensory changes   Endocrine:  Denies polyuria or polydipsia   Psychiatric:  Denies depression or anxiety      Vitals:    07/02/21 1442   BP: 110/70   Pulse: 72   Temp: 97.6 °F (36.4 °C)   SpO2: 96%       Physical Exam:  GEN: NAD, conversant  EYES: EOMI, PERRL, no conjunctival erythema  NECK: no thyromegaly, full ROM   CV: RRR, no murmurs/rubs/gallops, no peripheral edema  LUNG: CTAB, no wheezes/rales/ronchi  SKIN: no rashes, no acanthosis, has a skin nodules most likely from the triglycerides being elevated  MSK: no deformities, full ROM of all extremities  NEURO: no tremors, DTR normal  PSYCH: AOX3, appropriate mood, affect normal      Results Review:     I reviewed the patient's new clinical results.      Lab Results   Component Value Date    GLUCOSE 169 (H) 02/12/2021    BUN 17 02/12/2021    CREATININE 0.80 02/12/2021    EGFRIFNONA 100 02/12/2021    BCR 21.3 02/12/2021    K 4.6 02/12/2021    CO2 22.1 02/12/2021    CALCIUM 8.7 02/12/2021    ALBUMIN 4.40 02/12/2021    LABIL2 1.6 02/08/2019    AST 15 02/12/2021    ALT 17 02/12/2021    CHOL 386 (H) 02/12/2021    TRIG 4,026 (H) 02/12/2021    LDL  02/12/2021      Comment:      Unable to calculate    LDL 32 02/12/2021    HDL 7 (L) 02/12/2021     A1c from February 2021 was 9.3%.      Medication Review: Reviewed.       Current Outpatient Medications:   •  amLODIPine (NORVASC) 5 MG tablet, Take 1  tablet by mouth Daily., Disp: 90 tablet, Rfl: 3  •  aspirin 81 MG tablet, ASPIRIN 81 MG ORAL TABLET, Disp: , Rfl:   •  cholestyramine (QUESTRAN) 4 g packet, MIX 1 PACKET IN LIQUID AND DRINK TWICE DAILY, Disp: 180 packet, Rfl: 1  •  cholestyramine light (PREVALITE) 4 GM/DOSE powder, , Disp: , Rfl:   •  Empagliflozin (Jardiance) 25 MG tablet, Take 25 mg by mouth Daily., Disp: 30 tablet, Rfl: 12  •  fenofibrate 160 MG tablet, Daily., Disp: , Rfl:   •  glucagon (GLUCAGON EMERGENCY) 1 MG injection, GLUCAGON EMERGENCY 1 MG KIT, Disp: , Rfl:   •  insulin regular (HUMULIN R) 500 UNIT/ML CONCENTRATED injection, Inject 0.17 mL subcu 30 minutes before breakfast, 0.10 mL subcu 30 minutes before lunch and 0.12 mL subcu 30 minutes before supper, Disp: 20 mL, Rfl: 12  •  metFORMIN (GLUCOPHAGE) 1000 MG tablet, bid, Disp: , Rfl:   •  metoprolol tartrate (LOPRESSOR) 50 MG tablet, TAKE ONE AND ONE-HALF TABLET BY MOUTH TWICE A DAY, Disp: 270 tablet, Rfl: 2  •  Red Yeast Rice 500 MG/0.5GM powder, RED YEAST RICE, Disp: , Rfl:   •  rosuvastatin (CRESTOR) 40 MG tablet, CRESTOR 40 MG TABS, Disp: , Rfl:       Assessment/Plan   1.  Diabetes mellitus type II: Uncontrolled with high A1c of 9.3%.  At this time I have asked him to change U500 insulin to 0.20 mL subcu in the morning and continue 0.10 mL subcu at lunch and 0.12 ml sq ac supper.  He is advised to take insulin at least 30 minutes before he eats.  He will continue metformin and Jardiance. He will work on his diet and reduce bread intake.  We will follow blood sugars and A1c.  Advised to always give glucose dose in case of low blood sugar.    2.  Severe hypertriglyceridemia: He is currently on Crestor with fenofibrate, and cholestyramine.  Praluent did not help.  He is now using over-the-counter fish oil. Again advised to decrease bread and starches.  Plasmapheresis is cost prohibitive.  Remains high risk for acute pancreatitis and CAD.  Talked about dietitian consult, he feels like he  "is well versed and will take care of the diet on his own.    3.  Hypertension: Well-controlled, continue current medication    4.  Hyponatremia: Repeat sodium better..     5.  Diabetic nephropathy: On lisinopril.  Blood pressure is doing well.          Rosario Mendenhall MD FACE.      EMR Dragon / transcription disclaimer:     \"Dictated utilizing Dragon dictation\".                 "

## 2021-08-27 ENCOUNTER — OFFICE VISIT (OUTPATIENT)
Dept: CARDIOLOGY | Facility: CLINIC | Age: 56
End: 2021-08-27

## 2021-08-27 VITALS
OXYGEN SATURATION: 98 % | BODY MASS INDEX: 26.51 KG/M2 | HEIGHT: 73 IN | HEART RATE: 68 BPM | WEIGHT: 200 LBS | DIASTOLIC BLOOD PRESSURE: 79 MMHG | SYSTOLIC BLOOD PRESSURE: 139 MMHG

## 2021-08-27 DIAGNOSIS — I25.708 CORONARY ARTERY DISEASE OF BYPASS GRAFT OF NATIVE HEART WITH STABLE ANGINA PECTORIS (HCC): Primary | ICD-10-CM

## 2021-08-27 DIAGNOSIS — E11.65 TYPE 2 DIABETES MELLITUS WITH HYPERGLYCEMIA, WITHOUT LONG-TERM CURRENT USE OF INSULIN (HCC): ICD-10-CM

## 2021-08-27 DIAGNOSIS — E78.2 MIXED HYPERLIPIDEMIA: ICD-10-CM

## 2021-08-27 DIAGNOSIS — I10 ESSENTIAL HYPERTENSION: ICD-10-CM

## 2021-08-27 PROCEDURE — 99214 OFFICE O/P EST MOD 30 MIN: CPT | Performed by: INTERNAL MEDICINE

## 2021-08-27 NOTE — PROGRESS NOTES
"    Subjective:     Encounter Date:08/27/2021      Patient ID: Rodney Chaney is a 56 y.o. male.    Chief Complaint:  History of Present Illness 56-year-old white male with history of coronary disease history of hypertension hyperlipidemia and diabetes presents to my office for follow-up.  Patient is currently stable without any symptoms of chest pain or shortness of breath at rest or exertion.  No complaints any PND orthopnea.  No palpitation dizziness syncope or swelling of the feet.  Patient has been taking all the medicines regularly.  Patient does not smoke.  Patient is trying to exercise regularly patient follows a good diet.    The following portions of the patient's history were reviewed and updated as appropriate: allergies, current medications, past family history, past medical history, past social history, past surgical history and problem list.  Past Medical History:   Diagnosis Date   • CAD (coronary artery disease)    • Hyperlipidemia    • Hypertension    • Type 2 diabetes mellitus (CMS/HCC)      Past Surgical History:   Procedure Laterality Date   • APPENDECTOMY     • CHOLECYSTECTOMY     • CORONARY ARTERY BYPASS GRAFT       /79 (BP Location: Left arm, Patient Position: Sitting)   Pulse 68   Ht 185.4 cm (73\")   Wt 90.7 kg (200 lb)   SpO2 98%   BMI 26.39 kg/m²   Family History   Problem Relation Age of Onset   • Heart attack Mother    • Alcohol abuse Father        Current Outpatient Medications:   •  amLODIPine (NORVASC) 5 MG tablet, Take 1 tablet by mouth Daily., Disp: 90 tablet, Rfl: 3  •  aspirin 81 MG tablet, ASPIRIN 81 MG ORAL TABLET, Disp: , Rfl:   •  cholestyramine light (PREVALITE) 4 GM/DOSE powder, Take 1 packet by mouth 2 (Two) Times a Day With Meals., Disp: 180 g, Rfl: 6  •  empagliflozin (Jardiance) 25 MG tablet tablet, Take 1 tablet by mouth Daily., Disp: 30 tablet, Rfl: 12  •  fenofibrate 160 MG tablet, Take 1 tablet by mouth Daily., Disp: 90 tablet, Rfl: 4  •  glucagon " "(GLUCAGON EMERGENCY) 1 MG injection, GLUCAGON EMERGENCY 1 MG KIT, Disp: , Rfl:   •  insulin regular (HUMULIN R) 500 UNIT/ML CONCENTRATED injection, Inject 0.20 mL subcu 30 minutes before breakfast, 0.10 mL subcu 30 minutes before lunch and 0.12 mL subcu 30 minutes before supper, Disp: 20 mL, Rfl: 12  •  Insulin Syringe-Needle U-100 (BD Insulin Syringe U/F) 31G X 5/16\" 1 ML misc, Used to inject insulin twice a day., Disp: 100 each, Rfl: 6  •  metFORMIN (GLUCOPHAGE) 1000 MG tablet, Take 1 tablet by mouth 2 (Two) Times a Day With Meals., Disp: 180 tablet, Rfl: 4  •  metoprolol tartrate (LOPRESSOR) 50 MG tablet, Take 1.5 tablets by mouth 2 (Two) Times a Day., Disp: 270 tablet, Rfl: 6  •  Omega-3 Fatty Acids (OMEGA-3 2100 PO), Take  by mouth., Disp: , Rfl:   •  Red Yeast Rice 500 MG/0.5GM powder, RED YEAST RICE, Disp: , Rfl:   •  rosuvastatin (Crestor) 40 MG tablet, Take 1 tablet by mouth Daily., Disp: 90 tablet, Rfl: 4  Allergies   Allergen Reactions   • Levofloxacin Unknown (See Comments)     Social History     Socioeconomic History   • Marital status:      Spouse name: Not on file   • Number of children: Not on file   • Years of education: Not on file   • Highest education level: Not on file   Tobacco Use   • Smoking status: Never Smoker   • Smokeless tobacco: Never Used   Vaping Use   • Vaping Use: Never used   Substance and Sexual Activity   • Alcohol use: No   • Drug use: Defer   • Sexual activity: Defer     Review of Systems   Constitutional: Negative for fever and malaise/fatigue.   Cardiovascular: Negative for chest pain, dyspnea on exertion and palpitations.   Respiratory: Negative for cough and shortness of breath.    Skin: Negative for rash.   Gastrointestinal: Negative for abdominal pain, nausea and vomiting.   Neurological: Negative for focal weakness and headaches.   All other systems reviewed and are negative.             Objective:     Constitutional:       Appearance: Well-developed.   Eyes:      " General: No scleral icterus.     Conjunctiva/sclera: Conjunctivae normal.   HENT:      Head: Normocephalic and atraumatic.   Neck:      Vascular: No carotid bruit or JVD.   Pulmonary:      Effort: Pulmonary effort is normal.      Breath sounds: Normal breath sounds. No wheezing. No rales.   Cardiovascular:      Normal rate. Regular rhythm.   Pulses:     Intact distal pulses.   Abdominal:      General: Bowel sounds are normal.      Palpations: Abdomen is soft.   Musculoskeletal:      Cervical back: Normal range of motion and neck supple. Skin:     General: Skin is warm and dry.      Findings: No rash.   Neurological:      Mental Status: Alert.       Procedures    Lab Review:         MDM  1.  Coronary disease  Patient had coronary bypass surgery and stent placement in the past and is currently stable on medical therapy and has normal function  2.  Diabetes  Patient is currently on insulin and followed by the diabetologist  3.  Hypertension  Patient's blood pressure currently stable on medications  4.  Hyperlipidemia  Patient lipid levels are followed by primary doctor and is on a statin.  I will follow him in 6 months with a stress partially.      Patient's previous medical records, labs, and EKG were reviewed and discussed with the patient at today's visit.

## 2021-10-12 DIAGNOSIS — E11.65 TYPE 2 DIABETES MELLITUS WITH HYPERGLYCEMIA, WITHOUT LONG-TERM CURRENT USE OF INSULIN (HCC): Primary | ICD-10-CM

## 2021-10-12 RX ORDER — SYRINGE-NEEDLE,INSULIN,0.5 ML 27GX1/2"
SYRINGE, EMPTY DISPOSABLE MISCELLANEOUS
Qty: 200 EACH | Refills: 3 | Status: SHIPPED | OUTPATIENT
Start: 2021-10-12 | End: 2022-01-07 | Stop reason: SDUPTHER

## 2021-12-29 RX ORDER — COVID-19 MOLECULAR TEST ASSAY
KIT MISCELLANEOUS
COMMUNITY
Start: 2021-11-19

## 2021-12-30 ENCOUNTER — LAB (OUTPATIENT)
Dept: LAB | Facility: HOSPITAL | Age: 56
End: 2021-12-30

## 2021-12-30 DIAGNOSIS — E11.65 TYPE 2 DIABETES MELLITUS WITH HYPERGLYCEMIA, WITHOUT LONG-TERM CURRENT USE OF INSULIN: ICD-10-CM

## 2021-12-30 LAB
ALBUMIN SERPL-MCNC: 4.4 G/DL (ref 3.5–5.2)
ALBUMIN UR-MCNC: 2.8 MG/DL
ALBUMIN/GLOB SERPL: 1.8 G/DL
ALP SERPL-CCNC: 69 U/L (ref 39–117)
ALT SERPL W P-5'-P-CCNC: 15 U/L (ref 1–41)
ANION GAP SERPL CALCULATED.3IONS-SCNC: 10.3 MMOL/L (ref 5–15)
ARTICHOKE IGE QN: 13 MG/DL (ref 0–100)
AST SERPL-CCNC: 15 U/L (ref 1–40)
BILIRUB SERPL-MCNC: 0.3 MG/DL (ref 0–1.2)
BUN SERPL-MCNC: 20 MG/DL (ref 6–20)
BUN/CREAT SERPL: 19.8 (ref 7–25)
CALCIUM SPEC-SCNC: 9 MG/DL (ref 8.6–10.5)
CHLORIDE SERPL-SCNC: 97 MMOL/L (ref 98–107)
CHOLEST SERPL-MCNC: 294 MG/DL (ref 0–200)
CO2 SERPL-SCNC: 25.7 MMOL/L (ref 22–29)
CREAT SERPL-MCNC: 1.01 MG/DL (ref 0.76–1.27)
CREAT UR-MCNC: 36.4 MG/DL
GFR SERPL CREATININE-BSD FRML MDRD: 76 ML/MIN/1.73
GLOBULIN UR ELPH-MCNC: 2.5 GM/DL
GLUCOSE SERPL-MCNC: 266 MG/DL (ref 65–99)
HBA1C MFR BLD: 10 % (ref 3.5–5.6)
HDLC SERPL-MCNC: ABNORMAL MG/DL
LDLC SERPL CALC-MCNC: ABNORMAL MG/DL
LDLC/HDLC SERPL: ABNORMAL {RATIO}
MICROALBUMIN/CREAT UR: 76.9 MG/G
POTASSIUM SERPL-SCNC: 4.6 MMOL/L (ref 3.5–5.2)
PROT SERPL-MCNC: 6.9 G/DL (ref 6–8.5)
SODIUM SERPL-SCNC: 133 MMOL/L (ref 136–145)
TRIGL SERPL-MCNC: 2744 MG/DL (ref 0–150)
VLDLC SERPL-MCNC: ABNORMAL MG/DL

## 2021-12-30 PROCEDURE — 80061 LIPID PANEL: CPT

## 2021-12-30 PROCEDURE — 80053 COMPREHEN METABOLIC PANEL: CPT

## 2021-12-30 PROCEDURE — 82570 ASSAY OF URINE CREATININE: CPT

## 2021-12-30 PROCEDURE — 82043 UR ALBUMIN QUANTITATIVE: CPT

## 2021-12-30 PROCEDURE — 83721 ASSAY OF BLOOD LIPOPROTEIN: CPT

## 2021-12-30 PROCEDURE — 83036 HEMOGLOBIN GLYCOSYLATED A1C: CPT

## 2021-12-30 PROCEDURE — 36415 COLL VENOUS BLD VENIPUNCTURE: CPT

## 2022-01-07 ENCOUNTER — OFFICE VISIT (OUTPATIENT)
Dept: ENDOCRINOLOGY | Facility: CLINIC | Age: 57
End: 2022-01-07

## 2022-01-07 VITALS
HEART RATE: 67 BPM | BODY MASS INDEX: 26.11 KG/M2 | SYSTOLIC BLOOD PRESSURE: 132 MMHG | HEIGHT: 73 IN | WEIGHT: 197 LBS | OXYGEN SATURATION: 98 % | TEMPERATURE: 97.5 F | DIASTOLIC BLOOD PRESSURE: 70 MMHG

## 2022-01-07 DIAGNOSIS — I10 ESSENTIAL (PRIMARY) HYPERTENSION: ICD-10-CM

## 2022-01-07 DIAGNOSIS — E11.65 TYPE 2 DIABETES MELLITUS WITH HYPERGLYCEMIA, WITHOUT LONG-TERM CURRENT USE OF INSULIN: Primary | ICD-10-CM

## 2022-01-07 DIAGNOSIS — E11.21 DIABETIC NEPHROPATHY ASSOCIATED WITH TYPE 2 DIABETES MELLITUS: ICD-10-CM

## 2022-01-07 DIAGNOSIS — E78.1 PURE HYPERTRIGLYCERIDEMIA: ICD-10-CM

## 2022-01-07 LAB — GLUCOSE BLDC GLUCOMTR-MCNC: 183 MG/DL (ref 70–105)

## 2022-01-07 PROCEDURE — 82962 GLUCOSE BLOOD TEST: CPT | Performed by: INTERNAL MEDICINE

## 2022-01-07 PROCEDURE — 99214 OFFICE O/P EST MOD 30 MIN: CPT | Performed by: INTERNAL MEDICINE

## 2022-01-07 RX ORDER — SYRINGE-NEEDLE,INSULIN,0.5 ML 27GX1/2"
SYRINGE, EMPTY DISPOSABLE MISCELLANEOUS
Qty: 200 EACH | Refills: 3 | Status: SHIPPED | OUTPATIENT
Start: 2022-01-07 | End: 2022-01-18 | Stop reason: SDUPTHER

## 2022-01-07 RX ORDER — INSULIN HUMAN 500 [IU]/ML
INJECTION, SOLUTION SUBCUTANEOUS
Qty: 30 ML | Refills: 12 | Status: SHIPPED | OUTPATIENT
Start: 2022-01-07 | End: 2022-01-18 | Stop reason: SDUPTHER

## 2022-01-07 NOTE — PROGRESS NOTES
Endocrine Progress Note Outpatient     Patient Care Team:  Prabhu Downing MD as PCP - General  Prabhu Downing MD as PCP - Family Medicine  Ham Jacinto MD as Consulting Physician (Cardiology)    Chief Complaint: Follow up type 2 diabetes    HPI: 56-year-old male with history of type 2 diabetes, hypertension, hyperlipidemia specifically hypertriglyceridemia is here for follow-up.    For type 2 diabetes currently on metformin 1000 twice a day, U 500 insulin 0.20 mL subcu in the morning and 0.10 mL subcu before lunch 0.12 mL subcu before supper.  He is also on metformin and Jardiance.  He did bring in blood sugar records for review.  Mostly running about 200 and average blood sugar was 245.  No issues with low blood sugars.  His main problem is the starches especially bread.  He is not able to control diet.  Does not drink alcohol. He is trying to control his fat/starches intake.  He is also using some artificial sweeteners.    Hypertension: Well-controlled    Hyperlipidemia-hypertriglyceridemia: He is currently on Crestor with fenofibrate and cholestyramine.  Vascepa was not covered in past. He has new insurance now.  Is on generic fish oil 3 capsules twice a day.  We tried Praluent however he is not taking that at this time, we also referred him to a nephrologist for plasmapheresis for triglycerides however he tells me that this is cost prohibitive.  Does have history of pancreatitis.  No episode of pancreatitis since last seen in May 2020.    Past Medical History:   Diagnosis Date   • CAD (coronary artery disease)    • Hyperlipidemia    • Hypertension    • Type 2 diabetes mellitus (HCC)        Social History     Socioeconomic History   • Marital status:    Tobacco Use   • Smoking status: Never Smoker   • Smokeless tobacco: Never Used   Vaping Use   • Vaping Use: Never used   Substance and Sexual Activity   • Alcohol use: No   • Drug use: Defer   • Sexual activity: Defer       Family History   Problem  Relation Age of Onset   • Heart attack Mother    • Alcohol abuse Father        Allergies   Allergen Reactions   • Levofloxacin Unknown (See Comments)       ROS:   Constitutional:  Admit fatigue, tiredness.    Eyes:  Denies change in visual acuity   HENT:  Denies nasal congestion or sore throat   Respiratory: denies cough, shortness of breath.   Cardiovascular:  denies chest pain, edema   GI:  Denies abdominal pain, nausea, vomiting.   Musculoskeletal:  Denies back pain or joint pain   Integument:  Denies dry skin and rash   Neurologic:  Denies headache, focal weakness or sensory changes   Endocrine:  Denies polyuria or polydipsia   Psychiatric:  Denies depression or anxiety      Vitals:    01/07/22 1025   BP: 132/70   Pulse: 67   Temp: 97.5 °F (36.4 °C)   SpO2: 98%       Physical Exam:  GEN: NAD, conversant  EYES: EOMI, PERRL, no conjunctival erythema  NECK: no thyromegaly, full ROM   CV: RRR, no murmurs/rubs/gallops, no peripheral edema  LUNG: CTAB, no wheezes/rales/ronchi  SKIN: no rashes, no acanthosis, has a skin nodules most likely from the triglycerides being elevated  MSK: no deformities, full ROM of all extremities  NEURO: no tremors, DTR normal  PSYCH: AOX3, appropriate mood, affect normal      Results Review:     I reviewed the patient's new clinical results.      Lab Results   Component Value Date    GLUCOSE 266 (H) 12/30/2021    BUN 20 12/30/2021    CREATININE 1.01 12/30/2021    EGFRIFNONA 76 12/30/2021    BCR 19.8 12/30/2021    K 4.6 12/30/2021    CO2 25.7 12/30/2021    CALCIUM 9.0 12/30/2021    ALBUMIN 4.40 12/30/2021    LABIL2 1.6 02/08/2019    AST 15 12/30/2021    ALT 15 12/30/2021    CHOL 294 (H) 12/30/2021    TRIG 2,744 (H) 12/30/2021    LDL  12/30/2021      Comment:      Unable to calculate    LDL 13 12/30/2021    HDL  12/30/2021      Comment:      Unable to perform due to elevated triglyceride.      A1c from February 2021 was 9.3%.      Medication Review: Reviewed.       Current Outpatient  "Medications:   •  amLODIPine (NORVASC) 5 MG tablet, Take 1 tablet by mouth Daily., Disp: 90 tablet, Rfl: 3  •  aspirin 81 MG tablet, ASPIRIN 81 MG ORAL TABLET, Disp: , Rfl:   •  cholestyramine light (PREVALITE) 4 GM/DOSE powder, Take 1 packet by mouth 2 (Two) Times a Day With Meals., Disp: 180 g, Rfl: 6  •  fenofibrate 160 MG tablet, Take 1 tablet by mouth Daily., Disp: 90 tablet, Rfl: 4  •  glucagon (GLUCAGON EMERGENCY) 1 MG injection, GLUCAGON EMERGENCY 1 MG KIT, Disp: , Rfl:   •  ID Now COVID-19 kit, TEST AS DIRECTED TODAY, Disp: , Rfl:   •  insulin regular (HUMULIN R) 500 UNIT/ML CONCENTRATED injection, Inject 0.20 mL subcu 30 minutes before breakfast, 0.10 mL subcu 30 minutes before lunch and 0.12 mL subcu 30 minutes before supper, Disp: 20 mL, Rfl: 12  •  Insulin Syringe-Needle U-100 (BD Insulin Syringe U/F) 31G X 5/16\" 1 ML misc, Used to inject insulin twice a day., Disp: 200 each, Rfl: 3  •  metFORMIN (GLUCOPHAGE) 1000 MG tablet, Take 1 tablet by mouth 2 (Two) Times a Day With Meals., Disp: 180 tablet, Rfl: 4  •  metoprolol tartrate (LOPRESSOR) 50 MG tablet, Take 1.5 tablets by mouth 2 (Two) Times a Day., Disp: 270 tablet, Rfl: 6  •  Omega-3 Fatty Acids (OMEGA-3 2100 PO), Take  by mouth., Disp: , Rfl:   •  Red Yeast Rice 500 MG/0.5GM powder, RED YEAST RICE, Disp: , Rfl:   •  rosuvastatin (Crestor) 40 MG tablet, Take 1 tablet by mouth Daily., Disp: 90 tablet, Rfl: 4      Assessment/Plan   1.  Diabetes mellitus type II: Uncontrolled with high A1c of 10%.  At this time I have asked him to change U500 insulin to 0.20 mL subcu in the morning and increase to to 0.14 mL subcu at lunch and 0.14 ml sq ac supper.  He is advised to take insulin at least 30 minutes before he eats.  He will continue metformin and Jardiance. He will work on his diet and reduce bread intake.  We will follow blood sugars and A1c.  Advised to always give glucose dose in case of low blood sugar.    2.  Severe hypertriglyceridemia: He is " "currently on Crestor with fenofibrate, and cholestyramine.  Praluent did not help.  He is now using over-the-counter fish oil. Again advised to decrease bread and starches.  Plasmapheresis is cost prohibitive.  Remains high risk for acute pancreatitis and CAD.  Talked about dietitian consult, he feels like he is well versed and will take care of the diet on his own.    3.  Hypertension: Well-controlled, continue current medication    4.  Hyponatremia: Repeat sodium better..     5.  Diabetic nephropathy: On lisinopril.  Blood pressure is doing well.          Rosario Mendenhall MD FACE.      EMR Dragon / transcription disclaimer:     \"Dictated utilizing Dragon dictation\".                 "

## 2022-01-07 NOTE — PATIENT INSTRUCTIONS
This change her Humulin  insulin 2.20 mL subcu in the morning before breakfast 10.14 mL subcu before breakfast and lunch.  Please continue to work on your diet and activity and reduce the starch based carbohydrates  Always keep glucose source in case of low blood sugar  Annual eye exam and flu vaccine  Labs before follow-up.

## 2022-01-18 ENCOUNTER — TELEPHONE (OUTPATIENT)
Dept: ENDOCRINOLOGY | Facility: CLINIC | Age: 57
End: 2022-01-18

## 2022-01-18 DIAGNOSIS — E11.65 TYPE 2 DIABETES MELLITUS WITH HYPERGLYCEMIA, WITHOUT LONG-TERM CURRENT USE OF INSULIN: ICD-10-CM

## 2022-01-18 RX ORDER — SYRINGE-NEEDLE,INSULIN,0.5 ML 27GX1/2"
SYRINGE, EMPTY DISPOSABLE MISCELLANEOUS
Qty: 300 EACH | Refills: 3 | Status: SHIPPED | OUTPATIENT
Start: 2022-01-18

## 2022-01-18 RX ORDER — INSULIN HUMAN 500 [IU]/ML
INJECTION, SOLUTION SUBCUTANEOUS
Qty: 30 ML | Refills: 12
Start: 2022-01-18 | End: 2022-02-04 | Stop reason: SDUPTHER

## 2022-01-18 NOTE — TELEPHONE ENCOUNTER
[facility] had called stating they needed clarification on his Humulin R500 prescription. It was sent in as 0.20 ml before breakfast, 0.14ml before lunch, and 0.14ml before supper.  They needed it in units which I had clarified with you to be 20 units breakfast and 14 units lunch and supper. The pharmacist states it needs to be in increments of 5. Please advise.

## 2022-02-04 RX ORDER — INSULIN HUMAN 500 [IU]/ML
INJECTION, SOLUTION SUBCUTANEOUS
Qty: 30 ML | Refills: 12
Start: 2022-02-04 | End: 2022-02-18 | Stop reason: SDUPTHER

## 2022-02-18 DIAGNOSIS — E11.65 TYPE 2 DIABETES MELLITUS WITH HYPERGLYCEMIA, WITHOUT LONG-TERM CURRENT USE OF INSULIN: Primary | ICD-10-CM

## 2022-02-18 RX ORDER — INSULIN HUMAN 500 [IU]/ML
INJECTION, SOLUTION SUBCUTANEOUS
Qty: 30 ML | Refills: 3 | Status: SHIPPED | OUTPATIENT
Start: 2022-02-18 | End: 2022-02-24 | Stop reason: SDUPTHER

## 2022-02-18 NOTE — TELEPHONE ENCOUNTER
I can never figure out how much total to send on the U-500 for a 90 day supply. Can you help with that?

## 2022-02-24 ENCOUNTER — TELEPHONE (OUTPATIENT)
Dept: ENDOCRINOLOGY | Facility: CLINIC | Age: 57
End: 2022-02-24

## 2022-02-24 DIAGNOSIS — E11.65 TYPE 2 DIABETES MELLITUS WITH HYPERGLYCEMIA, WITHOUT LONG-TERM CURRENT USE OF INSULIN: ICD-10-CM

## 2022-02-24 RX ORDER — INSULIN HUMAN 500 [IU]/ML
INJECTION, SOLUTION SUBCUTANEOUS
Qty: 30 ML | Refills: 3 | Status: SHIPPED | OUTPATIENT
Start: 2022-02-24 | End: 2022-03-01 | Stop reason: SDUPTHER

## 2022-03-01 DIAGNOSIS — E11.65 TYPE 2 DIABETES MELLITUS WITH HYPERGLYCEMIA, WITHOUT LONG-TERM CURRENT USE OF INSULIN: ICD-10-CM

## 2022-03-01 RX ORDER — INSULIN HUMAN 500 [IU]/ML
INJECTION, SOLUTION SUBCUTANEOUS
Qty: 30 ML | Refills: 3 | Status: SHIPPED | OUTPATIENT
Start: 2022-03-01 | End: 2022-06-24 | Stop reason: SDUPTHER

## 2022-03-15 ENCOUNTER — TELEPHONE (OUTPATIENT)
Dept: ENDOCRINOLOGY | Facility: CLINIC | Age: 57
End: 2022-03-15

## 2022-03-15 NOTE — TELEPHONE ENCOUNTER
Jovanna will not fill his Humulin R because they say it has to be in increments of 5 so he needs the RX to say 20 units at breakfest, 15 units at lunch and supper. We have the RX ar 20 units, 14 units, 14 at supper. Is it OK to change the RX

## 2022-03-17 NOTE — TELEPHONE ENCOUNTER
Yes, that is ok per MD s/o. Pt may want to send educators his blood sugars for review once he starts new dosing.

## 2022-03-22 ENCOUNTER — TELEPHONE (OUTPATIENT)
Dept: ENDOCRINOLOGY | Facility: CLINIC | Age: 57
End: 2022-03-22

## 2022-03-22 NOTE — TELEPHONE ENCOUNTER
Per [facility], his U-500 insulin has to be in 5 unit increments. Said for example it can be 20 units before breakfast, 15 units before lunch, 15 units before supper.    Please advise.

## 2022-03-25 NOTE — TELEPHONE ENCOUNTER
I spoke with patient and he states the pharmacy has already sent him the U-500 unit vials. He states he spoke with the pharmacist there and got it straightened out. He states he will talk to Dr. Mendenhall about it more at his next appointment.

## 2022-04-07 ENCOUNTER — HOSPITAL ENCOUNTER (OUTPATIENT)
Dept: CARDIOLOGY | Facility: HOSPITAL | Age: 57
Discharge: HOME | End: 2022-04-07

## 2022-04-07 ENCOUNTER — OFFICE VISIT (OUTPATIENT)
Dept: CARDIOLOGY | Facility: CLINIC | Age: 57
End: 2022-04-07

## 2022-04-07 VITALS
WEIGHT: 196.5 LBS | SYSTOLIC BLOOD PRESSURE: 118 MMHG | DIASTOLIC BLOOD PRESSURE: 80 MMHG | HEIGHT: 73 IN | HEART RATE: 74 BPM | BODY MASS INDEX: 26.04 KG/M2

## 2022-04-07 DIAGNOSIS — I25.708 CORONARY ARTERY DISEASE OF BYPASS GRAFT OF NATIVE HEART WITH STABLE ANGINA PECTORIS: ICD-10-CM

## 2022-04-07 DIAGNOSIS — E78.2 MIXED HYPERLIPIDEMIA: ICD-10-CM

## 2022-04-07 DIAGNOSIS — E11.65 TYPE 2 DIABETES MELLITUS WITH HYPERGLYCEMIA, WITHOUT LONG-TERM CURRENT USE OF INSULIN: ICD-10-CM

## 2022-04-07 DIAGNOSIS — I10 ESSENTIAL HYPERTENSION: ICD-10-CM

## 2022-04-07 DIAGNOSIS — I25.708 CORONARY ARTERY DISEASE OF BYPASS GRAFT OF NATIVE HEART WITH STABLE ANGINA PECTORIS: Primary | ICD-10-CM

## 2022-04-07 LAB
BH CV REST NUCLEAR ISOTOPE DOSE: 10.9 MCI
BH CV STRESS BP STAGE 1: NORMAL
BH CV STRESS BP STAGE 2: NORMAL
BH CV STRESS BP STAGE 3: NORMAL
BH CV STRESS COMMENTS STAGE 1: NORMAL
BH CV STRESS DOSE REGADENOSON STAGE 1: 0.4
BH CV STRESS DURATION MIN STAGE 1: 3
BH CV STRESS DURATION MIN STAGE 2: 3
BH CV STRESS DURATION MIN STAGE 3: 2
BH CV STRESS DURATION SEC STAGE 1: 0
BH CV STRESS DURATION SEC STAGE 2: 0
BH CV STRESS DURATION SEC STAGE 3: 30
BH CV STRESS GRADE STAGE 1: 10
BH CV STRESS GRADE STAGE 2: 12
BH CV STRESS GRADE STAGE 3: 14
BH CV STRESS HR STAGE 1: 99
BH CV STRESS HR STAGE 2: 121
BH CV STRESS HR STAGE 3: 124
BH CV STRESS METS STAGE 1: 5
BH CV STRESS METS STAGE 2: 7.5
BH CV STRESS METS STAGE 3: 10
BH CV STRESS NUCLEAR ISOTOPE DOSE: 32.4 MCI
BH CV STRESS PROTOCOL 1: NORMAL
BH CV STRESS RECOVERY BP: NORMAL MMHG
BH CV STRESS RECOVERY HR: 104 BPM
BH CV STRESS SPEED STAGE 1: 1.7
BH CV STRESS SPEED STAGE 2: 2.5
BH CV STRESS SPEED STAGE 3: 3.4
BH CV STRESS STAGE 1: 1
BH CV STRESS STAGE 2: 2
BH CV STRESS STAGE 3: 3
LV EF NUC BP: 64 %
MAXIMAL PREDICTED HEART RATE: 164 BPM
PERCENT MAX PREDICTED HR: 75.61 %
STRESS BASELINE BP: NORMAL MMHG
STRESS BASELINE HR: 75 BPM
STRESS PERCENT HR: 89 %
STRESS POST ESTIMATED WORKLOAD: 9.5 METS
STRESS POST EXERCISE DUR MIN: 8 MIN
STRESS POST EXERCISE DUR SEC: 30 SEC
STRESS POST PEAK BP: NORMAL MMHG
STRESS POST PEAK HR: 124 BPM
STRESS TARGET HR: 139 BPM

## 2022-04-07 PROCEDURE — 99214 OFFICE O/P EST MOD 30 MIN: CPT | Performed by: INTERNAL MEDICINE

## 2022-04-07 PROCEDURE — 78452 HT MUSCLE IMAGE SPECT MULT: CPT

## 2022-04-07 PROCEDURE — A9500 TC99M SESTAMIBI: HCPCS | Performed by: INTERNAL MEDICINE

## 2022-04-07 PROCEDURE — 93017 CV STRESS TEST TRACING ONLY: CPT

## 2022-04-07 PROCEDURE — 93018 CV STRESS TEST I&R ONLY: CPT | Performed by: INTERNAL MEDICINE

## 2022-04-07 PROCEDURE — 78452 HT MUSCLE IMAGE SPECT MULT: CPT | Performed by: INTERNAL MEDICINE

## 2022-04-07 PROCEDURE — 0 TECHNETIUM SESTAMIBI: Performed by: INTERNAL MEDICINE

## 2022-04-07 RX ADMIN — TECHNETIUM TC 99M SESTAMIBI 1 DOSE: 1 INJECTION INTRAVENOUS at 08:20

## 2022-04-07 RX ADMIN — TECHNETIUM TC 99M SESTAMIBI 1 DOSE: 1 INJECTION INTRAVENOUS at 10:20

## 2022-04-07 NOTE — PROGRESS NOTES
"    Subjective:     Encounter Date:04/07/2022      Patient ID: Rodney Chaney is a 56 y.o. male.    Chief Complaint:  History of Present Illness 56-year-old white male with history of coronary status post carotid bypass surgery history of hypertension hyperlipidemia diabetes presents to my office for follow-up.  Patient is currently stable without any symptoms of chest pain shortness of breath at rest on exertion.  No complains any PND orthopnea.  No palpitation dizziness syncope or swelling of the feet.  He is taking his meds regular but he does not smoke.    The following portions of the patient's history were reviewed and updated as appropriate: allergies, current medications, past family history, past medical history, past social history, past surgical history and problem list.  Past Medical History:   Diagnosis Date   • CAD (coronary artery disease)    • Hyperlipidemia    • Hypertension    • Type 2 diabetes mellitus (HCC)      Past Surgical History:   Procedure Laterality Date   • APPENDECTOMY     • CHOLECYSTECTOMY     • CORONARY ARTERY BYPASS GRAFT       /80 (BP Location: Left arm, Patient Position: Sitting, Cuff Size: Adult)   Pulse 74   Ht 185.4 cm (73\")   Wt 89.1 kg (196 lb 8 oz)   BMI 25.93 kg/m²   Family History   Problem Relation Age of Onset   • Heart attack Mother    • Alcohol abuse Father        Current Outpatient Medications:   •  amLODIPine (NORVASC) 5 MG tablet, Take 1 tablet by mouth Daily., Disp: 90 tablet, Rfl: 3  •  aspirin 81 MG tablet, ASPIRIN 81 MG ORAL TABLET, Disp: , Rfl:   •  cholestyramine light (PREVALITE) 4 GM/DOSE powder, Take 1 packet by mouth 2 (Two) Times a Day With Meals., Disp: 180 g, Rfl: 6  •  empagliflozin (Jardiance) 25 MG tablet tablet, Take 1 tablet by mouth Daily., Disp: 90 tablet, Rfl: 4  •  fenofibrate 160 MG tablet, Take 1 tablet by mouth Daily., Disp: 90 tablet, Rfl: 4  •  glucagon (GLUCAGEN) 1 MG injection, GLUCAGON EMERGENCY 1 MG KIT, Disp: , Rfl:   •  ID " "Now COVID-19 kit, TEST AS DIRECTED TODAY, Disp: , Rfl:   •  insulin regular (HUMULIN R) 500 UNIT/ML CONCENTRATED injection, In an U-100 insulin syringe draw up and inject SQ 20 units before breakfast, 14 units before lunch, 14 units before supper. MDD:240, Disp: 30 mL, Rfl: 3  •  Insulin Syringe-Needle U-100 (BD Insulin Syringe U/F) 31G X 5/16\" 1 ML misc, Used to inject insulin three times a day., Disp: 300 each, Rfl: 3  •  metFORMIN (GLUCOPHAGE) 1000 MG tablet, Take 1 tablet by mouth 2 (Two) Times a Day With Meals., Disp: 180 tablet, Rfl: 4  •  metoprolol tartrate (LOPRESSOR) 50 MG tablet, Take 1.5 tablets by mouth 2 (Two) Times a Day., Disp: 270 tablet, Rfl: 6  •  Omega-3 Fatty Acids (OMEGA-3 2100 PO), Take  by mouth., Disp: , Rfl:   •  Red Yeast Rice 500 MG/0.5GM powder, RED YEAST RICE, Disp: , Rfl:   •  rosuvastatin (Crestor) 40 MG tablet, Take 1 tablet by mouth Daily., Disp: 90 tablet, Rfl: 4  No current facility-administered medications for this visit.  Allergies   Allergen Reactions   • Levofloxacin Unknown (See Comments)     Social History     Socioeconomic History   • Marital status:    Tobacco Use   • Smoking status: Never Smoker   • Smokeless tobacco: Never Used   Vaping Use   • Vaping Use: Never used   Substance and Sexual Activity   • Alcohol use: No   • Drug use: Defer   • Sexual activity: Defer     Review of Systems   Constitutional: Negative for fever and malaise/fatigue.   Cardiovascular: Negative for chest pain, dyspnea on exertion and palpitations.   Respiratory: Negative for cough and shortness of breath.    Skin: Negative for rash.   Gastrointestinal: Negative for abdominal pain, nausea and vomiting.   Neurological: Positive for numbness. Negative for focal weakness and headaches.   All other systems reviewed and are negative.             Objective:     Constitutional:       Appearance: Well-developed.   Eyes:      General: No scleral icterus.     Conjunctiva/sclera: Conjunctivae normal. "   HENT:      Head: Normocephalic and atraumatic.   Neck:      Vascular: No carotid bruit or JVD.   Pulmonary:      Effort: Pulmonary effort is normal.      Breath sounds: Normal breath sounds. No wheezing. No rales.   Cardiovascular:      Normal rate. Regular rhythm.   Pulses:     Intact distal pulses.   Abdominal:      General: Bowel sounds are normal.      Palpations: Abdomen is soft.   Musculoskeletal:      Cervical back: Normal range of motion and neck supple. Skin:     General: Skin is warm and dry.      Findings: No rash.   Neurological:      Mental Status: Alert.       Procedures    Lab Review:         MDM  1.  Coronary disease  Patient had coronary bypass related for vessels with a LIMA to LAD and saphenous graft to the diagonal branch marginal branch and RCA and is currently stable on medications  Patient had a stress test which only showed old inferior wall MI but no ischemia  2.  Hypertension  Patient blood pressure currently stable on medications during metoprolol  3.  Hyperlipidemia  Patient lipid levels are followed by primary care doctor next #4 diabetes  Patient is on insulin and oral medicines and followed by the primary care doctor.      Patient's previous medical records, labs, and EKG were reviewed and discussed with the patient at today's visit.

## 2022-06-07 NOTE — TELEPHONE ENCOUNTER
Asking about the vials vs pen.... I told him he can use what he wants it the  Pharmacy / Insurance  choice not ours. He asked to keep it the same. Vials

## 2022-06-14 ENCOUNTER — TELEPHONE (OUTPATIENT)
Dept: ENDOCRINOLOGY | Facility: CLINIC | Age: 57
End: 2022-06-14

## 2022-06-14 DIAGNOSIS — E11.65 TYPE 2 DIABETES MELLITUS WITH HYPERGLYCEMIA, WITHOUT LONG-TERM CURRENT USE OF INSULIN: Primary | ICD-10-CM

## 2022-06-14 NOTE — TELEPHONE ENCOUNTER
Pt is going for labs Friday but there are no orders in pts chart. Per 1/7/22 office visit, pt is to have labs before his fu appt. Please advise what labs you would like?

## 2022-06-18 LAB
ALBUMIN SERPL-MCNC: ABNORMAL G/DL
ALBUMIN/CREAT UR: 160 MG/G CREAT (ref 0–29)
ALBUMIN/GLOB SERPL: ABNORMAL {RATIO}
ALP SERPL-CCNC: 75 IU/L (ref 44–121)
ALT SERPL-CCNC: 16 IU/L (ref 0–44)
AMBIG ABBREV CMP14 DEFAULT: NORMAL
AMBIG ABBREV LP DEFAULT: NORMAL
AST SERPL-CCNC: 18 IU/L (ref 0–40)
BILIRUB SERPL-MCNC: 0.3 MG/DL (ref 0–1.2)
BUN SERPL-MCNC: 16 MG/DL (ref 6–24)
BUN/CREAT SERPL: 17 (ref 9–20)
CALCIUM SERPL-MCNC: 6.8 MG/DL (ref 8.7–10.2)
CHLORIDE SERPL-SCNC: 91 MMOL/L (ref 96–106)
CHOLEST SERPL-MCNC: 581 MG/DL (ref 100–199)
CO2 SERPL-SCNC: 18 MMOL/L (ref 20–29)
CREAT SERPL-MCNC: 0.96 MG/DL (ref 0.76–1.27)
CREAT UR-MCNC: 45.4 MG/DL
EGFRCR SERPLBLD CKD-EPI 2021: 92 ML/MIN/1.73
GLOBULIN SER CALC-MCNC: ABNORMAL G/DL
GLUCOSE SERPL-MCNC: 211 MG/DL (ref 65–99)
HBA1C MFR BLD: 9.6 % (ref 4.8–5.6)
HDLC SERPL-MCNC: 7 MG/DL
LDLC SERPL CALC-MCNC: ABNORMAL MG/DL (ref 0–99)
MICROALBUMIN UR-MCNC: 72.8 UG/ML
POTASSIUM SERPL-SCNC: ABNORMAL MMOL/L
PROT SERPL-MCNC: 6.8 G/DL (ref 6–8.5)
SODIUM SERPL-SCNC: 129 MMOL/L (ref 134–144)
TRIGL SERPL-MCNC: 7583 MG/DL (ref 0–149)
VLDLC SERPL CALC-MCNC: ABNORMAL MG/DL (ref 5–40)

## 2022-06-24 ENCOUNTER — OFFICE VISIT (OUTPATIENT)
Dept: ENDOCRINOLOGY | Facility: CLINIC | Age: 57
End: 2022-06-24

## 2022-06-24 VITALS
WEIGHT: 195 LBS | HEART RATE: 63 BPM | BODY MASS INDEX: 25.84 KG/M2 | DIASTOLIC BLOOD PRESSURE: 80 MMHG | SYSTOLIC BLOOD PRESSURE: 110 MMHG | OXYGEN SATURATION: 99 % | HEIGHT: 73 IN

## 2022-06-24 DIAGNOSIS — E83.51 HYPOCALCEMIA: ICD-10-CM

## 2022-06-24 DIAGNOSIS — E11.65 TYPE 2 DIABETES MELLITUS WITH HYPERGLYCEMIA, WITHOUT LONG-TERM CURRENT USE OF INSULIN: Primary | ICD-10-CM

## 2022-06-24 DIAGNOSIS — E11.21 DIABETIC NEPHROPATHY ASSOCIATED WITH TYPE 2 DIABETES MELLITUS: ICD-10-CM

## 2022-06-24 DIAGNOSIS — E78.1 PURE HYPERTRIGLYCERIDEMIA: ICD-10-CM

## 2022-06-24 DIAGNOSIS — I10 ESSENTIAL (PRIMARY) HYPERTENSION: ICD-10-CM

## 2022-06-24 LAB — GLUCOSE BLDC GLUCOMTR-MCNC: 221 MG/DL (ref 70–105)

## 2022-06-24 PROCEDURE — 82962 GLUCOSE BLOOD TEST: CPT | Performed by: INTERNAL MEDICINE

## 2022-06-24 PROCEDURE — 99214 OFFICE O/P EST MOD 30 MIN: CPT | Performed by: INTERNAL MEDICINE

## 2022-06-24 RX ORDER — INSULIN HUMAN 500 [IU]/ML
INJECTION, SOLUTION SUBCUTANEOUS
Qty: 30 ML | Refills: 3 | Status: SHIPPED | OUTPATIENT
Start: 2022-06-24 | End: 2022-10-31 | Stop reason: SDUPTHER

## 2022-06-24 RX ORDER — CHOLESTYRAMINE LIGHT 4 G/5.7G
4 POWDER, FOR SUSPENSION ORAL 2 TIMES DAILY WITH MEALS
Qty: 180 G | Refills: 6 | Status: SHIPPED | OUTPATIENT
Start: 2022-06-24

## 2022-06-24 NOTE — PATIENT INSTRUCTIONS
Please continue current medication  Increase Humulin  insulin 0.20 mL subcu 3 times a day half an hour before each meal  Continue to work on your diet and activity  I have sent a prescription for freestyle celestina sensor system hopefully that will be covered and it will help you to manage/monitor your diabetes  Always keep glucose source in case of low blood sugar  Reduce the starch based carbohydrate in your diet  Annual eye exam and flu vaccine  Labs before follow-up

## 2022-06-24 NOTE — PROGRESS NOTES
Endocrine Progress Note Outpatient     Patient Care Team:  Prabhu Downing MD as PCP - General  Prabhu Downing MD as PCP - Family Medicine  Ham Jacinto MD as Consulting Physician (Cardiology)    Chief Complaint: Follow up type 2 diabetes    HPI: 57-year-old male with history of type 2 diabetes, hypertension, hyperlipidemia specifically hypertriglyceridemia is here for follow-up.    For type 2 diabetes currently on metformin 1000 twice a day, U 500 insulin 0.20 mL subcu in the morning and 0.14 mL subcu before lunch 0.14 mL subcu before supper.  He is also on Jardiance.  He did bring in blood sugar records for review.  Mostly running about 200 and average blood sugar was 245.  No issues with low blood sugars.  His main problem is the starches especially bread.  He is not able to control diet.  Does not drink alcohol. He is trying to control his fat/starches intake.  He is also using some artificial sweeteners.    Hypertension: Well-controlled    Hyperlipidemia-hypertriglyceridemia: He is currently on Crestor with fenofibrate and cholestyramine.  Vascepa was not covered in past. He has new insurance now.  Is on generic fish oil 3 capsules twice a day.  We tried Praluent however he is not taking that at this time, we also referred him to a nephrologist for plasmapheresis for triglycerides however he tells me that this is cost prohibitive.  Does have history of pancreatitis.  No episode of pancreatitis since last seen in May 2020.    Past Medical History:   Diagnosis Date   • CAD (coronary artery disease)    • Hyperlipidemia    • Hypertension    • Type 2 diabetes mellitus (HCC)        Social History     Socioeconomic History   • Marital status:      Spouse name: hari   • Number of children: 0   • Years of education: 19   Tobacco Use   • Smoking status: Never Smoker   • Smokeless tobacco: Never Used   Vaping Use   • Vaping Use: Never used   Substance and Sexual Activity   • Alcohol use: No   • Drug use: Defer    • Sexual activity: Defer       Family History   Problem Relation Age of Onset   • Heart attack Mother    • Alcohol abuse Father        Allergies   Allergen Reactions   • Levofloxacin Unknown (See Comments)       ROS:   Constitutional:  Admit fatigue, tiredness.    Eyes:  Denies change in visual acuity   HENT:  Denies nasal congestion or sore throat   Respiratory: denies cough, shortness of breath.   Cardiovascular:  denies chest pain, edema   GI:  Denies abdominal pain, nausea, vomiting.   Musculoskeletal:  Denies back pain or joint pain   Integument:  Denies dry skin and rash   Neurologic:  Denies headache, focal weakness or sensory changes   Endocrine:  Denies polyuria or polydipsia   Psychiatric:  Denies depression or anxiety      Vitals:    06/24/22 1014   BP: 110/80   Pulse: 63   SpO2: 99%       Physical Exam:  GEN: NAD, conversant  EYES: EOMI, PERRL, no conjunctival erythema  NECK: no thyromegaly, full ROM   CV: RRR, no murmurs/rubs/gallops, no peripheral edema  LUNG: CTAB, no wheezes/rales/ronchi  SKIN: no rashes, no acanthosis, has a skin nodules most likely from the triglycerides being elevated  MSK: no deformities, full ROM of all extremities  NEURO: no tremors, DTR normal  PSYCH: AOX3, appropriate mood, affect normal      Results Review:     I reviewed the patient's new clinical results.      Lab Results   Component Value Date    GLUCOSE 211 (H) 06/17/2022    BUN 16 06/17/2022    CREATININE 0.96 06/17/2022    EGFRIFNONA 76 12/30/2021    BCR 17 06/17/2022    K CANCELED 06/17/2022    CO2 18 (L) 06/17/2022    CALCIUM 6.8 (C) 06/17/2022    PROTENTOTREF 6.8 06/17/2022    ALBUMIN CANCELED 06/17/2022    LABIL2 CANCELED 06/17/2022    AST 18 06/17/2022    ALT 16 06/17/2022    CHOL 294 (H) 12/30/2021    TRIG 7,583 (H) 06/17/2022    LDL Comment (A) 06/17/2022    HDL 7 (L) 06/17/2022     A1c from February 2021 was 9.3%.      Medication Review: Reviewed.       Current Outpatient Medications:   •  amLODIPine  "(NORVASC) 5 MG tablet, Take 1 tablet by mouth Daily., Disp: 90 tablet, Rfl: 3  •  aspirin 81 MG tablet, ASPIRIN 81 MG ORAL TABLET, Disp: , Rfl:   •  cholestyramine light (PREVALITE) 4 GM/DOSE powder, Take 1 packet by mouth 2 (Two) Times a Day With Meals., Disp: 180 g, Rfl: 6  •  empagliflozin (Jardiance) 25 MG tablet tablet, Take 1 tablet by mouth Daily., Disp: 90 tablet, Rfl: 4  •  fenofibrate 160 MG tablet, Take 1 tablet by mouth Daily., Disp: 90 tablet, Rfl: 4  •  glucagon (GLUCAGEN) 1 MG injection, GLUCAGON EMERGENCY 1 MG KIT, Disp: , Rfl:   •  ID Now COVID-19 kit, TEST AS DIRECTED TODAY, Disp: , Rfl:   •  insulin regular (HUMULIN R) 500 UNIT/ML CONCENTRATED injection, In an U-100 insulin syringe draw up and inject SQ 20 units before breakfast, 14 units before lunch, 14 units before supper. MDD:240, Disp: 30 mL, Rfl: 3  •  Insulin Syringe-Needle U-100 (BD Insulin Syringe U/F) 31G X 5/16\" 1 ML misc, Used to inject insulin three times a day., Disp: 300 each, Rfl: 3  •  metFORMIN (GLUCOPHAGE) 1000 MG tablet, Take 1 tablet by mouth 2 (Two) Times a Day With Meals., Disp: 180 tablet, Rfl: 4  •  metoprolol tartrate (LOPRESSOR) 50 MG tablet, Take 1.5 tablets by mouth 2 (Two) Times a Day., Disp: 270 tablet, Rfl: 6  •  Omega-3 Fatty Acids (OMEGA-3 2100 PO), Take  by mouth., Disp: , Rfl:   •  Red Yeast Rice 500 MG/0.5GM powder, RED YEAST RICE, Disp: , Rfl:   •  rosuvastatin (Crestor) 40 MG tablet, Take 1 tablet by mouth Daily., Disp: 90 tablet, Rfl: 4      Assessment & Plan   1.  Diabetes mellitus type II: Uncontrolled with high A1c of 9.6%.  At this time I have asked him to change U500 insulin to 0.20 mL subcu three times a day before each meal.  He will benefit from freestyle celestina sensor system.  We will send a prescription.  Advised to check blood sugars at least 4 times a day.  He is advised to take insulin at least 30 minutes before he eats.  He will continue metformin and Jardiance. He will work on his diet and " "reduce bread intake.  We will follow blood sugars and A1c.  Advised to always give glucose dose in case of low blood sugar.    2.  Severe hypertriglyceridemia: He is currently on Crestor with fenofibrate, and cholestyramine.  Also using over-the-counter fish oil.  Praluent did not help in the past.  Plasmapheresis was cost prohibitive.  And his insurance would not cover Vascepa. Again advised to decrease bread and starches.  Plasmapheresis is cost prohibitive.  Remains high risk for acute pancreatitis and CAD.  Talked about dietitian consult, he feels like he is well versed and will take care of the diet on his own.    3.  Hypertension: Well-controlled, continue current medication    4.  Diabetic nephropathy: On lisinopril.  Blood pressure is doing well.    5.  Hypocalcemia: We will recheck CMP.  In the meantime patient has been put on calcium supplementation and he is asymptomatic.          Rosario Mendenhall MD FACE.      EMR Dragon / transcription disclaimer:     \"Dictated utilizing Dragon dictation\".                 "

## 2022-06-30 DIAGNOSIS — E11.65 TYPE 2 DIABETES MELLITUS WITH HYPERGLYCEMIA, WITHOUT LONG-TERM CURRENT USE OF INSULIN: Primary | ICD-10-CM

## 2022-06-30 RX ORDER — BLOOD-GLUCOSE METER
1 EACH MISCELLANEOUS 4 TIMES DAILY
Qty: 1 KIT | Refills: 0 | Status: SHIPPED | OUTPATIENT
Start: 2022-06-30 | End: 2022-07-05 | Stop reason: SDUPTHER

## 2022-06-30 RX ORDER — BLOOD SUGAR DIAGNOSTIC
1 STRIP MISCELLANEOUS 4 TIMES DAILY
Qty: 400 EACH | Refills: 3 | Status: SHIPPED | OUTPATIENT
Start: 2022-06-30

## 2022-06-30 RX ORDER — LANCETS
1 EACH MISCELLANEOUS 4 TIMES DAILY
Qty: 400 EACH | Refills: 3 | Status: SHIPPED | OUTPATIENT
Start: 2022-06-30 | End: 2022-07-05 | Stop reason: SDUPTHER

## 2022-07-05 DIAGNOSIS — E11.65 TYPE 2 DIABETES MELLITUS WITH HYPERGLYCEMIA, WITHOUT LONG-TERM CURRENT USE OF INSULIN: ICD-10-CM

## 2022-07-05 RX ORDER — BLOOD-GLUCOSE METER
1 EACH MISCELLANEOUS 4 TIMES DAILY
Qty: 1 KIT | Refills: 0 | Status: SHIPPED | OUTPATIENT
Start: 2022-07-05

## 2022-07-05 RX ORDER — LANCETS
1 EACH MISCELLANEOUS 4 TIMES DAILY
Qty: 400 EACH | Refills: 3 | Status: SHIPPED | OUTPATIENT
Start: 2022-07-05

## 2022-07-06 RX ORDER — METOPROLOL TARTRATE 50 MG/1
TABLET, FILM COATED ORAL
Qty: 180 TABLET | Refills: 2 | Status: SHIPPED | OUTPATIENT
Start: 2022-07-06 | End: 2023-01-18

## 2022-08-04 RX ORDER — AMLODIPINE BESYLATE 5 MG/1
TABLET ORAL
Qty: 90 TABLET | Refills: 3 | Status: SHIPPED | OUTPATIENT
Start: 2022-08-04

## 2022-08-04 RX ORDER — FENOFIBRATE 160 MG/1
TABLET ORAL
Qty: 90 TABLET | Refills: 4 | Status: SHIPPED | OUTPATIENT
Start: 2022-08-04

## 2022-10-31 ENCOUNTER — TELEPHONE (OUTPATIENT)
Dept: ENDOCRINOLOGY | Facility: CLINIC | Age: 57
End: 2022-10-31

## 2022-10-31 DIAGNOSIS — E11.65 TYPE 2 DIABETES MELLITUS WITH HYPERGLYCEMIA, WITHOUT LONG-TERM CURRENT USE OF INSULIN: ICD-10-CM

## 2022-10-31 RX ORDER — INSULIN HUMAN 500 [IU]/ML
INJECTION, SOLUTION SUBCUTANEOUS
Qty: 30 ML | Refills: 3
Start: 2022-10-31 | End: 2023-01-30

## 2023-01-18 RX ORDER — METOPROLOL TARTRATE 50 MG/1
TABLET, FILM COATED ORAL
Qty: 180 TABLET | Refills: 3 | Status: SHIPPED | OUTPATIENT
Start: 2023-01-18

## 2023-01-27 DIAGNOSIS — E11.65 TYPE 2 DIABETES MELLITUS WITH HYPERGLYCEMIA, WITHOUT LONG-TERM CURRENT USE OF INSULIN: ICD-10-CM

## 2023-01-30 RX ORDER — EMPAGLIFLOZIN 25 MG/1
TABLET, FILM COATED ORAL
Qty: 90 TABLET | Refills: 4 | Status: SHIPPED | OUTPATIENT
Start: 2023-01-30

## 2023-01-30 RX ORDER — INSULIN HUMAN 500 [IU]/ML
INJECTION, SOLUTION SUBCUTANEOUS
Qty: 60 ML | Refills: 1 | Status: SHIPPED | OUTPATIENT
Start: 2023-01-30 | End: 2023-02-17

## 2023-02-09 ENCOUNTER — DOCUMENTATION (OUTPATIENT)
Dept: ENDOCRINOLOGY | Facility: CLINIC | Age: 58
End: 2023-02-09
Payer: COMMERCIAL

## 2023-02-09 NOTE — PROGRESS NOTES
Benefits Investigation Summary    Prescription: Renewal     Dispensing pharmacy: [facility]    Copay amount: Jardiance-$135.00/90 day *without copay card*    PLAN: [employer]  BIN: [id number]  PCN: [id number]  RX GROUP:     Prior Auth and Med Assistance notes: none    Johanna Anderson CPhT

## 2023-02-10 ENCOUNTER — LAB (OUTPATIENT)
Dept: LAB | Facility: HOSPITAL | Age: 58
End: 2023-02-10
Payer: COMMERCIAL

## 2023-02-10 DIAGNOSIS — E11.65 TYPE 2 DIABETES MELLITUS WITH HYPERGLYCEMIA, WITHOUT LONG-TERM CURRENT USE OF INSULIN: ICD-10-CM

## 2023-02-10 DIAGNOSIS — E78.1 PURE HYPERTRIGLYCERIDEMIA: ICD-10-CM

## 2023-02-10 LAB
ALBUMIN SERPL-MCNC: 4.5 G/DL (ref 3.5–5.2)
ALBUMIN UR-MCNC: 8.6 MG/DL
ALBUMIN/GLOB SERPL: 1.8 G/DL
ALP SERPL-CCNC: 68 U/L (ref 39–117)
ALT SERPL W P-5'-P-CCNC: 18 U/L (ref 1–41)
ANION GAP SERPL CALCULATED.3IONS-SCNC: 15 MMOL/L (ref 5–15)
ARTICHOKE IGE QN: 13 MG/DL (ref 0–100)
AST SERPL-CCNC: 22 U/L (ref 1–40)
BILIRUB SERPL-MCNC: 0.4 MG/DL (ref 0–1.2)
BUN SERPL-MCNC: 18 MG/DL (ref 6–20)
BUN/CREAT SERPL: 18 (ref 7–25)
CALCIUM SPEC-SCNC: 8.9 MG/DL (ref 8.6–10.5)
CHLORIDE SERPL-SCNC: 98 MMOL/L (ref 98–107)
CHOLEST SERPL-MCNC: 358 MG/DL (ref 0–200)
CO2 SERPL-SCNC: 22 MMOL/L (ref 22–29)
CREAT SERPL-MCNC: 1 MG/DL (ref 0.76–1.27)
CREAT UR-MCNC: 45.7 MG/DL
EGFRCR SERPLBLD CKD-EPI 2021: 87.8 ML/MIN/1.73
GLOBULIN UR ELPH-MCNC: 2.5 GM/DL
GLUCOSE SERPL-MCNC: 220 MG/DL (ref 65–99)
HBA1C MFR BLD: 8.9 % (ref 3.5–5.6)
HDLC SERPL-MCNC: 12 MG/DL (ref 40–60)
LDLC SERPL CALC-MCNC: ABNORMAL MG/DL
LDLC/HDLC SERPL: ABNORMAL {RATIO}
MICROALBUMIN/CREAT UR: 188.2 MG/G
POTASSIUM SERPL-SCNC: 4.5 MMOL/L (ref 3.5–5.2)
PROT SERPL-MCNC: 7 G/DL (ref 6–8.5)
SODIUM SERPL-SCNC: 135 MMOL/L (ref 136–145)
T4 FREE SERPL-MCNC: 0.98 NG/DL (ref 0.93–1.7)
TRIGL SERPL-MCNC: 3412 MG/DL (ref 0–150)
TSH SERPL DL<=0.05 MIU/L-ACNC: 0.94 UIU/ML (ref 0.27–4.2)
VLDLC SERPL-MCNC: ABNORMAL MG/DL

## 2023-02-10 PROCEDURE — 36415 COLL VENOUS BLD VENIPUNCTURE: CPT

## 2023-02-10 PROCEDURE — 83721 ASSAY OF BLOOD LIPOPROTEIN: CPT

## 2023-02-10 PROCEDURE — 82043 UR ALBUMIN QUANTITATIVE: CPT

## 2023-02-10 PROCEDURE — 83036 HEMOGLOBIN GLYCOSYLATED A1C: CPT

## 2023-02-10 PROCEDURE — 80061 LIPID PANEL: CPT

## 2023-02-10 PROCEDURE — 82570 ASSAY OF URINE CREATININE: CPT

## 2023-02-10 PROCEDURE — 80053 COMPREHEN METABOLIC PANEL: CPT

## 2023-02-10 PROCEDURE — 84439 ASSAY OF FREE THYROXINE: CPT

## 2023-02-10 PROCEDURE — 84443 ASSAY THYROID STIM HORMONE: CPT

## 2023-02-17 ENCOUNTER — SPECIALTY PHARMACY (OUTPATIENT)
Dept: ENDOCRINOLOGY | Facility: CLINIC | Age: 58
End: 2023-02-17
Payer: COMMERCIAL

## 2023-02-17 ENCOUNTER — OFFICE VISIT (OUTPATIENT)
Dept: ENDOCRINOLOGY | Facility: CLINIC | Age: 58
End: 2023-02-17
Payer: COMMERCIAL

## 2023-02-17 VITALS
BODY MASS INDEX: 25.98 KG/M2 | WEIGHT: 196 LBS | DIASTOLIC BLOOD PRESSURE: 80 MMHG | HEIGHT: 73 IN | SYSTOLIC BLOOD PRESSURE: 115 MMHG | HEART RATE: 88 BPM | OXYGEN SATURATION: 98 %

## 2023-02-17 DIAGNOSIS — E78.1 PURE HYPERTRIGLYCERIDEMIA: ICD-10-CM

## 2023-02-17 DIAGNOSIS — I10 ESSENTIAL (PRIMARY) HYPERTENSION: ICD-10-CM

## 2023-02-17 DIAGNOSIS — E11.65 TYPE 2 DIABETES MELLITUS WITH HYPERGLYCEMIA, WITHOUT LONG-TERM CURRENT USE OF INSULIN: Primary | ICD-10-CM

## 2023-02-17 DIAGNOSIS — E11.21 DIABETIC NEPHROPATHY ASSOCIATED WITH TYPE 2 DIABETES MELLITUS: ICD-10-CM

## 2023-02-17 DIAGNOSIS — E83.51 HYPOCALCEMIA: ICD-10-CM

## 2023-02-17 PROCEDURE — 99214 OFFICE O/P EST MOD 30 MIN: CPT | Performed by: INTERNAL MEDICINE

## 2023-02-17 RX ORDER — INSULIN HUMAN 500 [IU]/ML
INJECTION, SOLUTION SUBCUTANEOUS
Qty: 60 ML | Refills: 1 | Status: SHIPPED | OUTPATIENT
Start: 2023-02-17

## 2023-02-17 NOTE — PROGRESS NOTES
Endocrine Progress Note Outpatient     Patient Care Team:  Prabhu Downing MD as PCP - General  Prabhu Downing MD as PCP - Family Medicine  Ham Jacinto MD as Consulting Physician (Cardiology)    Chief Complaint: Follow up type 2 diabetes    HPI: 57-year-old male with history of type 2 diabetes, hypertension, hyperlipidemia specifically hypertriglyceridemia is here for follow-up.    For type 2 diabetes currently on metformin 1000 twice a day, U 500 insulin 0.22 mL subcu in the morning and 0.22 mL subcu before lunch 0.22 mL subcu before supper.  He is also on Jardiance.  He did bring in blood sugar records for review.  Mostly running about 200 and average blood sugar was 245.  No issues with low blood sugars.  His main problem is the starches especially bread.  He is not able to control diet.  Does not drink alcohol. He is trying to control his fat/starches intake.  He is also using some artificial sweeteners.    Hypertension: Well-controlled    Hyperlipidemia-hypertriglyceridemia: He is currently on Crestor with fenofibrate and cholestyramine.  Vascepa was not covered in past. He has new insurance now.  Is on generic fish oil 3 capsules twice a day.  We tried Praluent however he is not taking that at this time, we also referred him to a nephrologist for plasmapheresis for triglycerides however he tells me that this is cost prohibitive.  Does have history of pancreatitis.  No episode of pancreatitis since last seen in May 2020.    Past Medical History:   Diagnosis Date   • CAD (coronary artery disease)    • Hyperlipidemia    • Hypertension    • Type 2 diabetes mellitus (HCC)        Social History     Socioeconomic History   • Marital status:      Spouse name: hari   • Number of children: 0   • Years of education: 19   Tobacco Use   • Smoking status: Never   • Smokeless tobacco: Never   Vaping Use   • Vaping Use: Never used   Substance and Sexual Activity   • Alcohol use: No   • Drug use: Defer   • Sexual  activity: Defer       Family History   Problem Relation Age of Onset   • Heart attack Mother    • Alcohol abuse Father        Allergies   Allergen Reactions   • Levofloxacin Unknown (See Comments)       ROS:   Constitutional:  Admit fatigue, tiredness.    Eyes:  Denies change in visual acuity   HENT:  Denies nasal congestion or sore throat   Respiratory: denies cough, shortness of breath.   Cardiovascular:  denies chest pain, edema   GI:  Denies abdominal pain, nausea, vomiting.   Musculoskeletal:  Denies back pain or joint pain   Integument:  Denies dry skin and rash   Neurologic:  Denies headache, focal weakness or sensory changes   Endocrine:  Denies polyuria or polydipsia   Psychiatric:  Denies depression or anxiety      Vitals:    02/17/23 0956   BP: 115/80   Pulse: 88   SpO2: 98%     BMI: 25.9  Physical Exam:  GEN: NAD, conversant  EYES: EOMI, PERRL, no conjunctival erythema  NECK: no thyromegaly, full ROM   CV: RRR, no murmurs/rubs/gallops, no peripheral edema  LUNG: CTAB, no wheezes/rales/ronchi  SKIN: no rashes, no acanthosis, has a skin nodules most likely from the triglycerides being elevated  MSK: no deformities, full ROM of all extremities  NEURO: no tremors, DTR normal  PSYCH: AOX3, appropriate mood, affect normal      Results Review:     I reviewed the patient's new clinical results.      Lab Results   Component Value Date    GLUCOSE 220 (H) 02/10/2023    BUN 18 02/10/2023    CREATININE 1.00 02/10/2023    EGFRIFNONA 76 12/30/2021    BCR 18.0 02/10/2023    K 4.5 02/10/2023    CO2 22.0 02/10/2023    CALCIUM 8.9 02/10/2023    PROTENTOTREF 6.8 06/17/2022    ALBUMIN 4.5 02/10/2023    LABIL2 CANCELED 06/17/2022    AST 22 02/10/2023    ALT 18 02/10/2023    CHOL 358 (H) 02/10/2023    TRIG 3,412 (H) 02/10/2023    LDL  02/10/2023      Comment:      Unable to calculate    LDL 13 02/10/2023    HDL 12 (L) 02/10/2023     A1c from February 2021 was 9.3%.      Medication Review: Reviewed.       Current Outpatient  "Medications:   •  Accu-Chek Guide test strip, 1 each by Other route 4 (Four) Times a Day. Use as instructed, Disp: 400 each, Rfl: 3  •  Accu-Chek Softclix Lancets lancets, 1 each by Other route 4 (Four) Times a Day. Use as instructed, Disp: 400 each, Rfl: 3  •  amLODIPine (NORVASC) 5 MG tablet, TAKE ONE TABLET BY MOUTH DAILY, Disp: 90 tablet, Rfl: 3  •  aspirin 81 MG tablet, ASPIRIN 81 MG ORAL TABLET, Disp: , Rfl:   •  Blood Glucose Monitoring Suppl (Accu-Chek Guide) w/Device kit, 1 each 4 (Four) Times a Day., Disp: 1 kit, Rfl: 0  •  cholestyramine light (PREVALITE) 4 GM/DOSE powder, Take 1 packet by mouth 2 (Two) Times a Day With Meals., Disp: 180 g, Rfl: 6  •  fenofibrate 160 MG tablet, TAKE ONE TABLET BY MOUTH DAILY, Disp: 90 tablet, Rfl: 4  •  glucagon (GLUCAGEN) 1 MG injection, GLUCAGON EMERGENCY 1 MG KIT, Disp: , Rfl:   •  ID Now COVID-19 kit, TEST AS DIRECTED TODAY, Disp: , Rfl:   •  insulin regular (HUMULIN R) 500 UNIT/ML CONCENTRATED injection, DRAW TO 20 UNIT FABRICIO ON U100 SYRINGE (100 UNITS OF U500 INSULIN) AND INJECT SUBCUTANEOUSLY 3 TIMES A DAY, Disp: 60 mL, Rfl: 1  •  Insulin Syringe-Needle U-100 (BD Insulin Syringe U/F) 31G X 5/16\" 1 ML misc, Used to inject insulin three times a day., Disp: 300 each, Rfl: 3  •  Jardiance 25 MG tablet tablet, TAKE 1 TABLET EVERY DAY, Disp: 90 tablet, Rfl: 4  •  metFORMIN (GLUCOPHAGE) 1000 MG tablet, TAKE ONE TABLET BY MOUTH TWICE A DAY WITH MEALS, Disp: 180 tablet, Rfl: 4  •  metoprolol tartrate (LOPRESSOR) 50 MG tablet, TAKE 1 AND 1/2 TABLET BY MOUTH TWICE A DAY, Disp: 180 tablet, Rfl: 3  •  Omega-3 Fatty Acids (OMEGA-3 2100 PO), Take  by mouth., Disp: , Rfl:   •  Red Yeast Rice 500 MG/0.5GM powder, RED YEAST RICE, Disp: , Rfl:   •  rosuvastatin (Crestor) 40 MG tablet, Take 1 tablet by mouth Daily., Disp: 90 tablet, Rfl: 4      Assessment & Plan   1.  Diabetes mellitus type II with Hyperglycemia: Uncontrolled with high A1c of 8.9%.  At this time I have asked him to " "change U500 insulin to 0.24 mL subcu three times a day before each meal.  Did send a prescription for freestyle celestina system but he could not afford it.  Based upon his significant use of insulin as well as high blood sugars I think he will benefit from insulin pump therapy along with continuous glucose monitoring system.  I would recommend tandem insulin pump with Dexcom monitoring system.  I will refer him for that.  Advised to check blood sugars at least 4 times a day.  He is advised to take insulin at least 30 minutes before he eats.  He will continue metformin and Jardiance. He will work on his diet and reduce bread intake.  We will follow blood sugars and A1c.  Advised to always give glucose dose in case of low blood sugar.    2.  Severe hypertriglyceridemia: He is currently on Crestor with fenofibrate, and cholestyramine.  Also using over-the-counter fish oil.  Praluent did not help in the past.  Plasmapheresis was cost prohibitive.  And his insurance would not cover Vascepa. Again advised to decrease bread and starches.  Plasmapheresis is cost prohibitive.  Remains high risk for acute pancreatitis and CAD.  Talked about dietitian consult, he feels like he is well versed and will take care of the diet on his own.    3.  Hypertension: Well-controlled, continue current medication    4.  Diabetic nephropathy: On lisinopril.  Blood pressure is doing well.    5.  Hypocalcemia: Serum calcium now normal.    Assessment and plan from June 24, 2022 reviewed and updated the          Rosario Mendenhall MD FACE.      EMR Dragon / transcription disclaimer:     \"Dictated utilizing Dragon dictation\".                 "

## 2023-02-17 NOTE — PROGRESS NOTES
Specialty Pharmacy Patient Management Program  Endocrinology Introduction to Services Outreach     Rodney Chaney is a 57 y.o. male with Type 2 Diabetes seen by an Endocrinology provider. This was an initial visit to introduce Endocrinology Patient Management Program and Specialty Pharmacy services offered by [facility], as the patient is taking specialty medication Jardiance.     Rodney Chaney DECLINED to fill specialty medication at [facility] and/or enrollment in the Endocrine Disorders Patient Management Program at this time.  Patient is eligible for services in the future if desired.       Results of Benefits Investigation  Jardiance- should be $10 for copay card    Relevant Past Medical History and Comorbidities  Past Medical History:   Diagnosis Date   • CAD (coronary artery disease)    • Hyperlipidemia    • Hypertension    • Type 2 diabetes mellitus (HCC)      Social History     Socioeconomic History   • Marital status:      Spouse name: hari   • Number of children: 0   • Years of education: 19   Tobacco Use   • Smoking status: Never   • Smokeless tobacco: Never   Vaping Use   • Vaping Use: Never used   Substance and Sexual Activity   • Alcohol use: No   • Drug use: Defer   • Sexual activity: Defer          Allergies  Levofloxacin       Current Medication List    Current Outpatient Medications:   •  Accu-Chek Guide test strip, 1 each by Other route 4 (Four) Times a Day. Use as instructed, Disp: 400 each, Rfl: 3  •  Accu-Chek Softclix Lancets lancets, 1 each by Other route 4 (Four) Times a Day. Use as instructed, Disp: 400 each, Rfl: 3  •  amLODIPine (NORVASC) 5 MG tablet, TAKE ONE TABLET BY MOUTH DAILY, Disp: 90 tablet, Rfl: 3  •  aspirin 81 MG tablet, ASPIRIN 81 MG ORAL TABLET, Disp: , Rfl:   •  Blood Glucose Monitoring Suppl (Accu-Chek Guide) w/Device kit, 1 each 4 (Four) Times a Day., Disp: 1 kit, Rfl: 0  •  cholestyramine light (PREVALITE) 4 GM/DOSE  "powder, Take 1 packet by mouth 2 (Two) Times a Day With Meals., Disp: 180 g, Rfl: 6  •  fenofibrate 160 MG tablet, TAKE ONE TABLET BY MOUTH DAILY, Disp: 90 tablet, Rfl: 4  •  glucagon (GLUCAGEN) 1 MG injection, GLUCAGON EMERGENCY 1 MG KIT, Disp: , Rfl:   •  ID Now COVID-19 kit, TEST AS DIRECTED TODAY, Disp: , Rfl:   •  insulin regular (HUMULIN R) 500 UNIT/ML CONCENTRATED injection, DRAW TO 24 UNIT FABRICIO ON U100 SYRINGE (100 UNITS OF U500 INSULIN) AND INJECT SUBCUTANEOUSLY 3 TIMES A DAY, Disp: 60 mL, Rfl: 1  •  Insulin Syringe-Needle U-100 (BD Insulin Syringe U/F) 31G X 5/16\" 1 ML misc, Used to inject insulin three times a day., Disp: 300 each, Rfl: 3  •  Jardiance 25 MG tablet tablet, TAKE 1 TABLET EVERY DAY, Disp: 90 tablet, Rfl: 4  •  metFORMIN (GLUCOPHAGE) 1000 MG tablet, TAKE ONE TABLET BY MOUTH TWICE A DAY WITH MEALS, Disp: 180 tablet, Rfl: 4  •  metoprolol tartrate (LOPRESSOR) 50 MG tablet, TAKE 1 AND 1/2 TABLET BY MOUTH TWICE A DAY, Disp: 180 tablet, Rfl: 3  •  Omega-3 Fatty Acids (OMEGA-3 2100 PO), Take  by mouth., Disp: , Rfl:   •  Red Yeast Rice 500 MG/0.5GM powder, RED YEAST RICE, Disp: , Rfl:   •  rosuvastatin (Crestor) 40 MG tablet, Take 1 tablet by mouth Daily., Disp: 90 tablet, Rfl: 4       Changes to Therapy Plan Discussed with Patient  Medication Therapy Changes by Provider Increase humulin R U-500 dose, continue other medications. Patient denies any issues or side effects with his current medications. He mentioned that he sometimes has issues getting the humulin from the pharmacy. Advised if he has issues in the future to let us know and we may be able to process it at our pharmacy.  Discussed the pharmacy services with the patient and he is currently happy with the [facility] pharmacy. Explained that we are happy to provided services in the future should he change his mind or have issues with prescriptions.   Additional Plans, Therapy Recommendations, or Therapy Problems to Be Addressed: None "     Michelle Miller, PharmD  Clinical Specialty Pharmacist, Endocrinology  2/17/2023  12:58 EST

## 2023-02-17 NOTE — PATIENT INSTRUCTIONS
Increase Humulin R U-500 insulin to 0.24 mL subcu 3 times a day half an hour before each meal  Continue rest of the medication  Always keep glucose source in case of low blood sugar  Please schedule appointment with diabetes educators for insulin pump and CGMS evaluation  Continue to work on diet and activity  Labs before follow-up

## 2023-03-10 RX ORDER — SYRINGE-NEEDLE,INSULIN,0.5 ML 27GX1/2"
1 SYRINGE, EMPTY DISPOSABLE MISCELLANEOUS 3 TIMES DAILY
Qty: 300 EACH | Refills: 1 | Status: SHIPPED | OUTPATIENT
Start: 2023-03-10

## 2023-07-31 RX ORDER — EMPAGLIFLOZIN 25 MG/1
TABLET, FILM COATED ORAL
Qty: 30 TABLET | Refills: 5 | Status: SHIPPED | OUTPATIENT
Start: 2023-07-31

## 2023-07-31 RX ORDER — ROSUVASTATIN CALCIUM 40 MG/1
TABLET, COATED ORAL
Qty: 30 TABLET | Refills: 5 | Status: SHIPPED | OUTPATIENT
Start: 2023-07-31

## 2023-08-15 ENCOUNTER — TELEPHONE (OUTPATIENT)
Dept: ENDOCRINOLOGY | Facility: CLINIC | Age: 58
End: 2023-08-15
Payer: COMMERCIAL

## 2023-09-08 RX ORDER — FENOFIBRATE 160 MG/1
TABLET ORAL
Qty: 90 TABLET | Refills: 3 | Status: SHIPPED | OUTPATIENT
Start: 2023-09-08

## 2023-09-14 RX ORDER — AMLODIPINE BESYLATE 5 MG/1
TABLET ORAL
Qty: 90 TABLET | Refills: 3 | Status: SHIPPED | OUTPATIENT
Start: 2023-09-14

## 2023-09-14 RX ORDER — METOPROLOL TARTRATE 50 MG/1
TABLET, FILM COATED ORAL
Qty: 180 TABLET | Refills: 3 | Status: SHIPPED | OUTPATIENT
Start: 2023-09-14

## 2023-10-09 ENCOUNTER — TELEPHONE (OUTPATIENT)
Dept: ENDOCRINOLOGY | Facility: CLINIC | Age: 58
End: 2023-10-09
Payer: COMMERCIAL

## 2023-10-09 RX ORDER — LANCETS 30 GAUGE
1 EACH MISCELLANEOUS 4 TIMES DAILY
Qty: 200 EACH | Refills: 6 | Status: SHIPPED | OUTPATIENT
Start: 2023-10-09

## 2023-10-09 RX ORDER — BLOOD-GLUCOSE METER
1 EACH MISCELLANEOUS ONCE
Qty: 1 KIT | Refills: 0 | Status: SHIPPED | OUTPATIENT
Start: 2023-10-09 | End: 2023-10-09

## 2023-10-09 RX ORDER — BLOOD SUGAR DIAGNOSTIC
STRIP MISCELLANEOUS
Qty: 200 EACH | Refills: 6 | Status: SHIPPED | OUTPATIENT
Start: 2023-10-09

## 2023-10-09 NOTE — TELEPHONE ENCOUNTER
Specialty Pharmacy Patient Management Program       Rodney Chaney is a 58 y.o. male seen by an Endocrinology provider for Type 2 Diabetes.    Patient called and reported that he has a new insurance. Sent new prescriptions for testing supplies based on insurance coverage. Patient was previously prescribed freestyle javy 2 to monitor blood sugar and patient inquired to see how much it would cost on his new insurance. Based on claims it is about $37 per month. Patient requested that a prescription be sent to his pharmacy.    Requested Prescriptions     Signed Prescriptions Disp Refills    glucose blood (OneTouch Verio) test strip 200 each 6     Sig: Use as instructed to test blood sugar 4 times daily     Authorizing Provider: CLIFFORD MENDENHALL     Ordering User: PAUL MILLER    Blood Glucose Monitoring Suppl (OneTouch Verio Flex System) w/Device kit 1 kit 0     Sig: Use 1 each 1 (One) Time for 1 dose.     Authorizing Provider: CLIFFORD MENDENHALL User: PAUL MILLER    Lancets (OneTouch Delica Plus [id number]) misc 200 each 6     Sig: Use 1 each 4 (Four) Times a Day.     Authorizing Provider: CLIFFORD MENDENHALL     Ordering User: PAUL MILLER    Continuous Blood Gluc Sensor (FreeStyle Javy 2 Sensor) misc 6 each 3     Sig: Use 1 each Every 14 (Fourteen) Days. Use to monitor blood sugar as directed     Authorizing Provider: CLIFFORD MENDENHALL     Ordering User: PAUL MILLER       Refill sent in to patient's pharmacy for above medication prescribed by Dr. Mendenhall.   Last office visit 2/17/23.  Next visit scheduled 3/11/24.    Paul Miller, PharmD  Clinical Specialty Pharmacist, Endocrinology  10/9/2023  15:40 EDT

## 2024-01-22 ENCOUNTER — TELEPHONE (OUTPATIENT)
Dept: ENDOCRINOLOGY | Facility: CLINIC | Age: 59
End: 2024-01-22

## 2024-01-22 RX ORDER — INSULIN HUMAN 500 [IU]/ML
INJECTION, SOLUTION SUBCUTANEOUS
Qty: 24 ML | Refills: 1 | Status: SHIPPED | OUTPATIENT
Start: 2024-01-22

## 2024-01-22 RX ORDER — ROSUVASTATIN CALCIUM 40 MG/1
TABLET, COATED ORAL
Qty: 90 TABLET | Refills: 1 | Status: SHIPPED | OUTPATIENT
Start: 2024-01-22

## 2024-01-22 RX ORDER — EMPAGLIFLOZIN 25 MG/1
TABLET, FILM COATED ORAL
Qty: 90 TABLET | Refills: 1 | Status: SHIPPED | OUTPATIENT
Start: 2024-01-22

## 2024-01-22 NOTE — TELEPHONE ENCOUNTER
Hub staff attempted to follow warm transfer process and was unsuccessful     Caller: Rodney Chaney A    Relationship to patient: Self    Best call back number:   [phone]         Patient is needing: PT STATED THAT HE HAS NEW INSURANCE AND THAT HE NEEDING A MONTH SUPPLY OF MEDICATION INSTEAD OF 3 MONTHS OF SUPPLY. PT ALSO STATED THAT HIS INSURANCE WILL NOT [facility]. SAJI CALL BACK AND ADVISE.

## 2024-02-29 DIAGNOSIS — E11.65 TYPE 2 DIABETES MELLITUS WITH HYPERGLYCEMIA, WITHOUT LONG-TERM CURRENT USE OF INSULIN: Primary | ICD-10-CM

## 2024-03-04 ENCOUNTER — LAB (OUTPATIENT)
Dept: LAB | Facility: HOSPITAL | Age: 59
End: 2024-03-04
Payer: COMMERCIAL

## 2024-03-04 DIAGNOSIS — E11.65 TYPE 2 DIABETES MELLITUS WITH HYPERGLYCEMIA, WITHOUT LONG-TERM CURRENT USE OF INSULIN: ICD-10-CM

## 2024-03-04 LAB
ALBUMIN SERPL-MCNC: 4.4 G/DL (ref 3.5–5.2)
ALBUMIN UR-MCNC: 2.1 MG/DL
ALBUMIN/GLOB SERPL: 2 G/DL
ALP SERPL-CCNC: 49 U/L (ref 39–117)
ALT SERPL W P-5'-P-CCNC: 20 U/L (ref 1–41)
ANION GAP SERPL CALCULATED.3IONS-SCNC: 10.2 MMOL/L (ref 5–15)
ARTICHOKE IGE QN: 15 MG/DL (ref 0–100)
AST SERPL-CCNC: 19 U/L (ref 1–40)
BILIRUB SERPL-MCNC: 0.4 MG/DL (ref 0–1.2)
BUN SERPL-MCNC: 13 MG/DL (ref 6–20)
BUN/CREAT SERPL: 13.8 (ref 7–25)
CALCIUM SPEC-SCNC: 8.9 MG/DL (ref 8.6–10.5)
CHLORIDE SERPL-SCNC: 103 MMOL/L (ref 98–107)
CHOLEST SERPL-MCNC: 149 MG/DL (ref 0–200)
CO2 SERPL-SCNC: 25.8 MMOL/L (ref 22–29)
CREAT SERPL-MCNC: 0.94 MG/DL (ref 0.76–1.27)
CREAT UR-MCNC: 56.5 MG/DL
EGFRCR SERPLBLD CKD-EPI 2021: 94 ML/MIN/1.73
GLOBULIN UR ELPH-MCNC: 2.2 GM/DL
GLUCOSE SERPL-MCNC: 222 MG/DL (ref 65–99)
HBA1C MFR BLD: 8.7 % (ref 4.8–5.6)
HDLC SERPL-MCNC: 17 MG/DL (ref 40–60)
LDLC SERPL CALC-MCNC: ABNORMAL MG/DL
LDLC/HDLC SERPL: ABNORMAL {RATIO}
MICROALBUMIN/CREAT UR: 37.2 MG/G (ref 0–29)
POTASSIUM SERPL-SCNC: 4.4 MMOL/L (ref 3.5–5.2)
PROT SERPL-MCNC: 6.6 G/DL (ref 6–8.5)
SODIUM SERPL-SCNC: 139 MMOL/L (ref 136–145)
TRIGL SERPL-MCNC: 1056 MG/DL (ref 0–150)
VLDLC SERPL-MCNC: ABNORMAL MG/DL

## 2024-03-04 PROCEDURE — 83721 ASSAY OF BLOOD LIPOPROTEIN: CPT

## 2024-03-04 PROCEDURE — 36415 COLL VENOUS BLD VENIPUNCTURE: CPT

## 2024-03-04 PROCEDURE — 82043 UR ALBUMIN QUANTITATIVE: CPT

## 2024-03-04 PROCEDURE — 82570 ASSAY OF URINE CREATININE: CPT

## 2024-03-04 PROCEDURE — 80061 LIPID PANEL: CPT

## 2024-03-04 PROCEDURE — 80053 COMPREHEN METABOLIC PANEL: CPT

## 2024-03-04 PROCEDURE — 83036 HEMOGLOBIN GLYCOSYLATED A1C: CPT

## 2024-03-05 RX ORDER — ACYCLOVIR 400 MG/1
1 TABLET ORAL
Qty: 3 EACH | Refills: 3 | Status: SHIPPED | OUTPATIENT
Start: 2024-03-05

## 2024-03-05 RX ORDER — ACYCLOVIR 400 MG/1
1 TABLET ORAL TAKE AS DIRECTED
Qty: 1 EACH | Refills: 1 | Status: SHIPPED | OUTPATIENT
Start: 2024-03-05

## 2024-03-11 ENCOUNTER — OFFICE VISIT (OUTPATIENT)
Dept: ENDOCRINOLOGY | Facility: CLINIC | Age: 59
End: 2024-03-11
Payer: COMMERCIAL

## 2024-03-11 VITALS
BODY MASS INDEX: 24.78 KG/M2 | HEIGHT: 73 IN | DIASTOLIC BLOOD PRESSURE: 62 MMHG | HEART RATE: 60 BPM | OXYGEN SATURATION: 99 % | WEIGHT: 187 LBS | SYSTOLIC BLOOD PRESSURE: 110 MMHG

## 2024-03-11 DIAGNOSIS — E78.1 PURE HYPERTRIGLYCERIDEMIA: ICD-10-CM

## 2024-03-11 DIAGNOSIS — E11.65 TYPE 2 DIABETES MELLITUS WITH HYPERGLYCEMIA, WITHOUT LONG-TERM CURRENT USE OF INSULIN: Primary | ICD-10-CM

## 2024-03-11 DIAGNOSIS — I10 ESSENTIAL (PRIMARY) HYPERTENSION: ICD-10-CM

## 2024-03-11 DIAGNOSIS — E11.21 DIABETIC NEPHROPATHY ASSOCIATED WITH TYPE 2 DIABETES MELLITUS: ICD-10-CM

## 2024-03-11 PROCEDURE — 95251 CONT GLUC MNTR ANALYSIS I&R: CPT | Performed by: INTERNAL MEDICINE

## 2024-03-11 PROCEDURE — 99214 OFFICE O/P EST MOD 30 MIN: CPT | Performed by: INTERNAL MEDICINE

## 2024-03-11 RX ORDER — FLURBIPROFEN SODIUM 0.3 MG/ML
SOLUTION/ DROPS OPHTHALMIC
Qty: 100 EACH | Refills: 6 | Status: SHIPPED | OUTPATIENT
Start: 2024-03-11

## 2024-03-11 RX ORDER — INSULIN HUMAN 500 [IU]/ML
INJECTION, SOLUTION SUBCUTANEOUS
Qty: 40 ML | Refills: 6 | Status: SHIPPED | OUTPATIENT
Start: 2024-03-11 | End: 2024-03-11

## 2024-03-11 RX ORDER — CHOLESTYRAMINE LIGHT 4 G/5.7G
4 POWDER, FOR SUSPENSION ORAL 2 TIMES DAILY WITH MEALS
Qty: 180 G | Refills: 6 | Status: SHIPPED | OUTPATIENT
Start: 2024-03-11

## 2024-03-11 NOTE — PROGRESS NOTES
Endocrine Progress Note Outpatient     Patient Care Team:  Prabhu Downing MD as PCP - General  Prabhu Downing MD as PCP - Family Medicine  Ham Jacinto MD as Consulting Physician (Cardiology)    Chief Complaint: Follow up type 2 diabetes    HPI: 58-year-old male with history of type 2 diabetes, hypertension, hyperlipidemia specifically hypertriglyceridemia is here for follow-up.    For type 2 diabetes currently on metformin 1000 twice a day, U 500 insulin 120 units sq three times a day, Jardiance 25 mg p.o. daily he is using freestyle celestina sensor system..      Hypertension: Well-controlled    Hyperlipidemia-hypertriglyceridemia: He is currently on Crestor with fenofibrate and cholestyramine.  Vascepa was not covered in past. He has new insurance now.  Is on generic fish oil 3 capsules twice a day.  We tried Praluent however he is not taking that at this time, we also referred him to a nephrologist for plasmapheresis for triglycerides however he tells me that this is cost prohibitive.  Does have history of pancreatitis.  No episode of pancreatitis since last seen in May 2020.    Past Medical History:   Diagnosis Date    CAD (coronary artery disease)     Hyperlipidemia     Hypertension     Type 2 diabetes mellitus        Social History     Socioeconomic History    Marital status:      Spouse name: hari    Number of children: 0    Years of education: 19   Tobacco Use    Smoking status: Never    Smokeless tobacco: Never   Vaping Use    Vaping status: Never Used   Substance and Sexual Activity    Alcohol use: No    Drug use: Defer    Sexual activity: Defer       Family History   Problem Relation Age of Onset    Heart attack Mother     Alcohol abuse Father        Allergies   Allergen Reactions    Levofloxacin Unknown (See Comments)       ROS:   Constitutional:  Admit fatigue, tiredness.    Eyes:  Denies change in visual acuity   HENT:  Denies nasal congestion or sore throat   Respiratory: denies cough,  shortness of breath.   Cardiovascular:  denies chest pain, edema   GI:  Denies abdominal pain, nausea, vomiting.   Musculoskeletal:  Denies back pain or joint pain   Integument:  Denies dry skin and rash   Neurologic:  Denies headache, focal weakness or sensory changes   Endocrine:  Denies polyuria or polydipsia   Psychiatric:  Denies depression or anxiety      Vitals:    03/11/24 1117   BP: 110/62   Pulse: 60   SpO2: 99%     BMI: 24.7  Physical Exam:  GEN: NAD, conversant  EYES: EOMI,   NECK: no thyromegaly,   CV: RRR,   LUNG: CTA  PSYCH: AOX3, appropriate mood, affect normal      Results Review:     I reviewed the patient's new clinical results.      Lab Results   Component Value Date    GLUCOSE 222 (H) 03/04/2024    BUN 13 03/04/2024    CREATININE 0.94 03/04/2024    EGFRIFNONA 76 12/30/2021    BCR 13.8 03/04/2024    K 4.4 03/04/2024    CO2 25.8 03/04/2024    CALCIUM 8.9 03/04/2024    PROTENTOTREF 6.8 06/17/2022    ALBUMIN 4.4 03/04/2024    LABIL2 CANCELED 06/17/2022    AST 19 03/04/2024    ALT 20 03/04/2024    CHOL 149 03/04/2024    TRIG 1,056 (H) 03/04/2024    LDL  03/04/2024      Comment:      Unable to calculate    LDL 15 03/04/2024    HDL 17 (L) 03/04/2024     Freestyle celestina download interpretation: Dates covered was between February 27, 2024 to March 11, 2024.  Average blood sugar is 236.  Spending 23% in the range and 77% above range.  Glucose graph does show significant hyperglycemia throughout the day.    Microalbumin / Creatinine Urine Ratio - Urine, Clean Catch (03/04/2024 08:36)    Hemoglobin A1c (03/04/2024 08:36)  Medication Review: Reviewed.       Current Outpatient Medications:     amLODIPine (NORVASC) 5 MG tablet, TAKE ONE TABLET BY MOUTH DAILY, Disp: 90 tablet, Rfl: 3    aspirin 81 MG tablet, ASPIRIN 81 MG ORAL TABLET, Disp: , Rfl:     cholestyramine light (PREVALITE) 4 GM/DOSE powder, Take 1 packet by mouth 2 (Two) Times a Day With Meals., Disp: 180 g, Rfl: 6    Continuous Blood Gluc   "(Dexcom G7 ) device, Use 1 each Take As Directed., Disp: 1 each, Rfl: 1    Continuous Blood Gluc Sensor (Dexcom G7 Sensor) misc, Use 1 each Every 10 (Ten) Days., Disp: 3 each, Rfl: 3    fenofibrate 160 MG tablet, TAKE ONE TABLET BY MOUTH DAILY, Disp: 90 tablet, Rfl: 3    glucagon (GLUCAGEN) 1 MG injection, GLUCAGON EMERGENCY 1 MG KIT, Disp: , Rfl:     glucose blood (OneTouch Verio) test strip, Use as instructed to test blood sugar 4 times daily, Disp: 200 each, Rfl: 6    HumuLIN R U-500 KwikPen 500 UNIT/ML solution pen-injector CONCENTRATED injection, INJECT 120 UNITS SUBCUTANEOUSLY INTO APPROPRIATE AREA THREE TIMES DAILY BEFORE MEALS, Disp: 24 mL, Rfl: 1    ID Now COVID-19 kit, TEST AS DIRECTED TODAY, Disp: , Rfl:     Insulin Syringe-Needle U-100 (BD Insulin Syringe U/F) 31G X 5/16\" 1 ML misc, Used to inject insulin three times a day., Disp: 300 each, Rfl: 3    Insulin Syringe-Needle U-100 (Droplet Insulin Syringe) 31G X 5/16\" 1 ML misc, 1 each 3 (Three) Times a Day. Used to inject insulin three times a day., Disp: 300 each, Rfl: 1    Insulin Syringe/Needle U-500 31G X 6MM 0.5 ML misc, 1 each 3 (Three) Times a Day. Use to inject Humulin R-U500 inject 120 units., Disp: 100 each, Rfl: 2    Jardiance 25 MG tablet tablet, Take 1 tablet by mouth once daily, Disp: 90 tablet, Rfl: 1    Lancets (OneTouch Delica Plus [id number]) misc, Use 1 each 4 (Four) Times a Day., Disp: 200 each, Rfl: 6    metFORMIN (GLUCOPHAGE) 1000 MG tablet, TAKE ONE TABLET BY MOUTH TWICE A DAY WITH MEALS, Disp: 180 tablet, Rfl: 4    metoprolol tartrate (LOPRESSOR) 50 MG tablet, TAKE 1 AND 1/2 TABLET BY MOUTH TWICE A DAY, Disp: 180 tablet, Rfl: 3    Omega-3 Fatty Acids (OMEGA-3 2100 PO), Take  by mouth., Disp: , Rfl:     Red Yeast Rice 500 MG/0.5GM powder, RED YEAST RICE, Disp: , Rfl:     rosuvastatin (CRESTOR) 40 MG tablet, Take 1 tablet by mouth once daily, Disp: 90 tablet, Rfl: 1      Assessment & Plan   1.  Diabetes mellitus type II with " "Hyperglycemia: Uncontrolled with A1c at 8.7%.  Unfortunately not able to use GLP-1 agonist due to severe hypertriglyceridemia as well as history of pancreatitis.  At this time I will change his Humulin R U-500 insulin 230 units before breakfast, 130 units before lunch and continue 120 units at supper.  Recommend to continue Jardiance with metformin.  Recommend to continue to work on diet and activity and always keep glucose source in case of low blood sugars.    2.  Severe hypertriglyceridemia: He is currently on Crestor with fenofibrate, and cholestyramine.  Also using over-the-counter fish oil.  Praluent did not help in the past.  Plasmapheresis was cost prohibitive.  And his insurance would not cover Vascepa. Again advised to decrease bread and starches.  Plasmapheresis is cost prohibitive.  Remains high risk for acute pancreatitis and CAD.  Talked about dietitian consult, he feels like he is well versed and will take care of the diet on his own.    3.  Hypertension: Well-controlled, continue current medication    4.  Diabetic nephropathy: On lisinopril.  Blood pressure is doing well.    5.  Hypocalcemia: Serum calcium now normal.    Assessment and plan from February 17, 2023 reviewed and updated.          Rosario Mendenhall MD FACE.      EMR Dragon / transcription disclaimer:     \"Dictated utilizing Dragon dictation\".                 "

## 2024-03-11 NOTE — PATIENT INSTRUCTIONS
Please change  Humulin R U-500 insulin 230 units subcu before breakfast and lunch and 20 units subcu before supper  Continue rest of the medication  Continue to work on your diet and activity  Labs before follow-up.

## 2024-04-08 ENCOUNTER — OFFICE VISIT (OUTPATIENT)
Dept: CARDIOLOGY | Facility: CLINIC | Age: 59
End: 2024-04-08
Payer: COMMERCIAL

## 2024-04-08 VITALS
HEIGHT: 73 IN | BODY MASS INDEX: 25.31 KG/M2 | WEIGHT: 191 LBS | DIASTOLIC BLOOD PRESSURE: 69 MMHG | SYSTOLIC BLOOD PRESSURE: 118 MMHG | HEART RATE: 81 BPM | OXYGEN SATURATION: 98 %

## 2024-04-08 DIAGNOSIS — I10 PRIMARY HYPERTENSION: ICD-10-CM

## 2024-04-08 DIAGNOSIS — E78.00 PURE HYPERCHOLESTEROLEMIA: ICD-10-CM

## 2024-04-08 DIAGNOSIS — E10.9 TYPE 1 DIABETES MELLITUS WITHOUT COMPLICATION: ICD-10-CM

## 2024-04-08 DIAGNOSIS — I25.10 CORONARY ARTERY DISEASE INVOLVING NATIVE CORONARY ARTERY OF NATIVE HEART WITHOUT ANGINA PECTORIS: Primary | ICD-10-CM

## 2024-04-08 PROCEDURE — 99214 OFFICE O/P EST MOD 30 MIN: CPT | Performed by: INTERNAL MEDICINE

## 2024-04-08 NOTE — PROGRESS NOTES
"    Subjective:     Encounter Date:04/08/2024      Patient ID: Rodney Chaney is a 58 y.o. male.    Chief Complaint:  History of Present Illness 58-year-old white male with history of coronary artery status post carotid bypass surgery history of diabetes hypertension hyperlipidemia presents to the office for a follow-up.  Patient is currently stable without any symptoms of chest pain or shortness of breath at rest on exertion.  No complaint of any PND orthopnea.  No palpitation dizziness syncope or swelling of the feet.  Patient is taking all her medicines regularly.  Patient does not smoke.    The following portions of the patient's history were reviewed and updated as appropriate: allergies, current medications, past family history, past medical history, past social history, past surgical history, and problem list.  Past Medical History:   Diagnosis Date    CAD (coronary artery disease)     Hyperlipidemia     Hypertension     Type 2 diabetes mellitus      Past Surgical History:   Procedure Laterality Date    APPENDECTOMY      CHOLECYSTECTOMY      CORONARY ARTERY BYPASS GRAFT       /69   Pulse 81   Ht 185.4 cm (73\")   Wt 86.6 kg (191 lb)   SpO2 98%   BMI 25.20 kg/m²   Family History   Problem Relation Age of Onset    Heart attack Mother     Alcohol abuse Father        Current Outpatient Medications:     amLODIPine (NORVASC) 5 MG tablet, TAKE ONE TABLET BY MOUTH DAILY, Disp: 90 tablet, Rfl: 3    aspirin 81 MG tablet, ASPIRIN 81 MG ORAL TABLET, Disp: , Rfl:     cholestyramine light (PREVALITE) 4 GM/DOSE powder, Take 1 packet by mouth 2 (Two) Times a Day With Meals., Disp: 180 g, Rfl: 6    Continuous Blood Gluc  (Dexcom G7 ) device, Use 1 each Take As Directed., Disp: 1 each, Rfl: 1    Continuous Blood Gluc Sensor (Dexcom G7 Sensor) misc, Use 1 each Every 10 (Ten) Days., Disp: 3 each, Rfl: 3    fenofibrate 160 MG tablet, TAKE ONE TABLET BY MOUTH DAILY, Disp: 90 tablet, Rfl: 3    glucagon " (GLUCAGEN) 1 MG injection, GLUCAGON EMERGENCY 1 MG KIT, Disp: , Rfl:     glucose blood (OneTouch Verio) test strip, Use as instructed to test blood sugar 4 times daily, Disp: 200 each, Rfl: 6    ID Now COVID-19 kit, TEST AS DIRECTED TODAY, Disp: , Rfl:     Insulin Pen Needle (B-D UF III MINI PEN NEEDLES) 31G X 5 MM misc, Used to inject insulin 3 times a day., Disp: 100 each, Rfl: 6    Insulin Syringe/Needle U-500 31G X 6MM 0.5 ML misc, 1 each 3 (Three) Times a Day. Use to inject Humulin R-U500 inject 120 units., Disp: 100 each, Rfl: 2    Jardiance 25 MG tablet tablet, Take 1 tablet by mouth once daily, Disp: 90 tablet, Rfl: 1    Lancets (OneTouch Delica Plus [id number]) misc, Use 1 each 4 (Four) Times a Day., Disp: 200 each, Rfl: 6    metFORMIN (GLUCOPHAGE) 1000 MG tablet, TAKE ONE TABLET BY MOUTH TWICE A DAY WITH MEALS, Disp: 180 tablet, Rfl: 4    metoprolol tartrate (LOPRESSOR) 50 MG tablet, TAKE 1 AND 1/2 TABLET BY MOUTH TWICE A DAY, Disp: 180 tablet, Rfl: 3    Omega-3 Fatty Acids (OMEGA-3 2100 PO), Take  by mouth., Disp: , Rfl:     Red Yeast Rice 500 MG/0.5GM powder, RED YEAST RICE, Disp: , Rfl:     rosuvastatin (CRESTOR) 40 MG tablet, Take 1 tablet by mouth once daily, Disp: 90 tablet, Rfl: 1  Allergies   Allergen Reactions    Levofloxacin Unknown (See Comments)     Social History     Socioeconomic History    Marital status:      Spouse name: hari    Number of children: 0    Years of education: 19   Tobacco Use    Smoking status: Never    Smokeless tobacco: Never   Vaping Use    Vaping status: Never Used   Substance and Sexual Activity    Alcohol use: No    Drug use: Defer    Sexual activity: Defer     Review of Systems   Constitutional: Positive for malaise/fatigue.   Cardiovascular:  Negative for chest pain, dyspnea on exertion, leg swelling and palpitations.   Respiratory:  Negative for cough and shortness of breath.    Gastrointestinal:  Negative for abdominal pain, nausea and vomiting.    Neurological:  Positive for numbness. Negative for dizziness, focal weakness, headaches and light-headedness.   All other systems reviewed and are negative.             Objective:     Constitutional:       Appearance: Well-developed.   Eyes:      General: No scleral icterus.     Conjunctiva/sclera: Conjunctivae normal.   HENT:      Head: Normocephalic and atraumatic.   Neck:      Vascular: No carotid bruit or JVD.   Pulmonary:      Effort: Pulmonary effort is normal.      Breath sounds: Normal breath sounds. No wheezing. No rales.   Cardiovascular:      Normal rate. Regular rhythm.   Pulses:     Intact distal pulses.   Abdominal:      General: Bowel sounds are normal.      Palpations: Abdomen is soft.   Musculoskeletal:      Cervical back: Normal range of motion and neck supple. Skin:     General: Skin is warm and dry.      Findings: No rash.   Neurological:      Mental Status: Alert.       Procedures    Lab Review:         MDM    #1 coronary disease  Patient had coronary bypass 3-4 vessels with a LIMA to LAD and saphenous graft to the marginal branch diagonal branch and RCA and is currently stable on medications  2.  Hypertension  Patient is currently on medical therapy with amlodipine and metoprolol and stable  3 hyperlipidemia  Patient is on Crestor and the lipid levels are well within normal limits  4.  Diabetes  Patient is on insulin as well as oral medicines and followed by the primary care doctor.    Patient's previous medical records, labs, and EKG were reviewed and discussed with the patient at today's visit.

## 2024-05-31 RX ORDER — METOPROLOL TARTRATE 50 MG/1
TABLET, FILM COATED ORAL
Qty: 180 TABLET | Refills: 3 | Status: SHIPPED | OUTPATIENT
Start: 2024-05-31

## 2024-06-03 RX ORDER — BLOOD SUGAR DIAGNOSTIC
STRIP MISCELLANEOUS
Qty: 150 EACH | Refills: 12 | Status: SHIPPED | OUTPATIENT
Start: 2024-06-03

## 2024-06-26 RX ORDER — ACYCLOVIR 400 MG/1
TABLET ORAL
Qty: 3 EACH | Refills: 6 | Status: SHIPPED | OUTPATIENT
Start: 2024-06-26

## 2024-07-10 RX ORDER — EMPAGLIFLOZIN 25 MG/1
TABLET, FILM COATED ORAL
Qty: 90 TABLET | Refills: 3 | Status: SHIPPED | OUTPATIENT
Start: 2024-07-10

## 2024-07-10 RX ORDER — ROSUVASTATIN CALCIUM 40 MG/1
TABLET, COATED ORAL
Qty: 90 TABLET | Refills: 3 | Status: SHIPPED | OUTPATIENT
Start: 2024-07-10

## 2024-08-05 ENCOUNTER — TELEPHONE (OUTPATIENT)
Dept: CARDIOLOGY | Facility: CLINIC | Age: 59
End: 2024-08-05
Payer: COMMERCIAL

## 2024-08-05 NOTE — TELEPHONE ENCOUNTER
Caller: Rodney hCaney    Relationship: Self    Best call back number: [phone]      PATIENT IS NEEDING AN EXTRACTION, HE STATES THAT THE DENTIST SENT SOMETHING ABOUT A MONTH AGO FOR CLEARANCE.   THERE IS NOTHING IN CHART, PLEASE REACH OUT TO THE PATIENT TO DISCUSS.

## 2024-08-05 NOTE — TELEPHONE ENCOUNTER
Called patient and verbally explained that his dental office will need to fax over the cardiac clearance request to office. Verbally gave fax number over the phone.    Patient verbally understood and will be calling their office to have them fax over the clearance.

## 2024-08-07 ENCOUNTER — TELEPHONE (OUTPATIENT)
Dept: CARDIOLOGY | Facility: CLINIC | Age: 59
End: 2024-08-07
Payer: COMMERCIAL

## 2024-08-07 NOTE — TELEPHONE ENCOUNTER
FACILITY: [facility]  DR:   PHONE: [phone]  FAX: [phone]  PROCEDURE: dental procedure   SCHEDULED:   MEDS TO HOLD: ASA      Placed clearance in Dr. Jacinto's folder

## 2024-08-12 ENCOUNTER — TELEPHONE (OUTPATIENT)
Dept: CARDIOLOGY | Facility: CLINIC | Age: 59
End: 2024-08-12
Payer: COMMERCIAL

## 2024-08-12 DIAGNOSIS — I25.118 CORONARY ARTERY DISEASE OF NATIVE ARTERY OF NATIVE HEART WITH STABLE ANGINA PECTORIS: ICD-10-CM

## 2024-08-12 DIAGNOSIS — I20.9 TYPICAL ANGINA: Primary | ICD-10-CM

## 2024-08-12 DIAGNOSIS — I25.10 CORONARY ARTERY DISEASE INVOLVING NATIVE CORONARY ARTERY OF NATIVE HEART WITHOUT ANGINA PECTORIS: ICD-10-CM

## 2024-08-12 NOTE — TELEPHONE ENCOUNTER
Patient wants call back from medical assistant or nurse. He called in about dental clearance.  I told him Dr. Jacinto cleared him. He said Dr. Jacinto wants him to have a stress this fall and thinks he needs it done sooner. We only have him scheduled for a 6 month apt 10/28/24. No orders for stress test.

## 2024-08-12 NOTE — TELEPHONE ENCOUNTER
Called patient, he understood. Tried to transfer call to Diagnostics no answer.     Please call and schedule kendra asap

## 2024-08-12 NOTE — TELEPHONE ENCOUNTER
Called patient, he is concerned when he mows his chest gets tight he wants to know if he needs a sooner appointment to be seen.

## 2024-08-12 NOTE — TELEPHONE ENCOUNTER
Patient can have his dental work done without any stress test but I will schedule a stress test at his next visit.

## 2024-08-14 NOTE — TELEPHONE ENCOUNTER
Patient was scheduled for today by Piedad but the patient had down he was scheduled for 8/20 @ 8am. I apologized and told him I only saw today scheduled but went ahead and moved him to 8/20 @ 12pm since the 8 am spot was already taken.

## 2024-08-20 ENCOUNTER — HOSPITAL ENCOUNTER (OUTPATIENT)
Dept: CARDIOLOGY | Facility: HOSPITAL | Age: 59
Discharge: HOME | End: 2024-08-20
Payer: COMMERCIAL

## 2024-08-20 DIAGNOSIS — R94.39 ABNORMAL NUCLEAR STRESS TEST: ICD-10-CM

## 2024-08-20 DIAGNOSIS — I20.89 ANGINA AT REST: Primary | ICD-10-CM

## 2024-08-20 DIAGNOSIS — I20.9 TYPICAL ANGINA: ICD-10-CM

## 2024-08-20 DIAGNOSIS — I25.118 CORONARY ARTERY DISEASE OF NATIVE ARTERY OF NATIVE HEART WITH STABLE ANGINA PECTORIS: ICD-10-CM

## 2024-08-20 LAB
BH CV REST NUCLEAR ISOTOPE DOSE: 10.9 MCI
BH CV STRESS COMMENTS STAGE 1: NORMAL
BH CV STRESS DOSE REGADENOSON STAGE 1: 0.4
BH CV STRESS DURATION MIN STAGE 1: 0
BH CV STRESS DURATION SEC STAGE 1: 10
BH CV STRESS NUCLEAR ISOTOPE DOSE: 32.9 MCI
BH CV STRESS PROTOCOL 1: NORMAL
BH CV STRESS RECOVERY BP: NORMAL MMHG
BH CV STRESS RECOVERY HR: 107 BPM
BH CV STRESS STAGE 1: 1
LV EF NUC BP: 43 %
MAXIMAL PREDICTED HEART RATE: 161 BPM
STRESS BASELINE BP: NORMAL MMHG
STRESS BASELINE HR: 79 BPM
STRESS TARGET HR: 137 BPM

## 2024-08-20 PROCEDURE — 25010000002 REGADENOSON 0.4 MG/5ML SOLUTION: Performed by: INTERNAL MEDICINE

## 2024-08-20 PROCEDURE — 78452 HT MUSCLE IMAGE SPECT MULT: CPT | Performed by: INTERNAL MEDICINE

## 2024-08-20 PROCEDURE — 78452 HT MUSCLE IMAGE SPECT MULT: CPT

## 2024-08-20 PROCEDURE — A9500 TC99M SESTAMIBI: HCPCS | Performed by: INTERNAL MEDICINE

## 2024-08-20 PROCEDURE — 93018 CV STRESS TEST I&R ONLY: CPT | Performed by: INTERNAL MEDICINE

## 2024-08-20 PROCEDURE — 0 TECHNETIUM SESTAMIBI: Performed by: INTERNAL MEDICINE

## 2024-08-20 PROCEDURE — 93016 CV STRESS TEST SUPVJ ONLY: CPT | Performed by: NURSE PRACTITIONER

## 2024-08-20 PROCEDURE — 93017 CV STRESS TEST TRACING ONLY: CPT

## 2024-08-20 RX ORDER — REGADENOSON 0.08 MG/ML
0.4 INJECTION, SOLUTION INTRAVENOUS
Status: COMPLETED | OUTPATIENT
Start: 2024-08-20 | End: 2024-08-20

## 2024-08-20 RX ADMIN — REGADENOSON 0.4 MG: 0.08 INJECTION, SOLUTION INTRAVENOUS at 13:31

## 2024-08-20 RX ADMIN — TECHNETIUM TC 99M SESTAMIBI 1 DOSE: 1 INJECTION INTRAVENOUS at 12:22

## 2024-08-20 RX ADMIN — TECHNETIUM TC 99M SESTAMIBI 1 DOSE: 1 INJECTION INTRAVENOUS at 13:31

## 2024-08-21 PROBLEM — R94.39 ABNORMAL NUCLEAR STRESS TEST: Status: ACTIVE | Noted: 2024-08-20

## 2024-08-21 PROBLEM — I20.89 ANGINA AT REST: Status: ACTIVE | Noted: 2024-08-20

## 2024-08-28 ENCOUNTER — LAB (OUTPATIENT)
Dept: LAB | Facility: HOSPITAL | Age: 59
End: 2024-08-28
Payer: COMMERCIAL

## 2024-08-28 DIAGNOSIS — R94.39 ABNORMAL NUCLEAR STRESS TEST: ICD-10-CM

## 2024-08-28 DIAGNOSIS — I20.89 ANGINA AT REST: ICD-10-CM

## 2024-08-28 LAB
ANION GAP SERPL CALCULATED.3IONS-SCNC: 5.6 MMOL/L (ref 5–15)
BASOPHILS # BLD AUTO: 0.04 10*3/MM3 (ref 0–0.2)
BASOPHILS NFR BLD AUTO: 0.7 % (ref 0–1.5)
BUN SERPL-MCNC: 15 MG/DL (ref 6–20)
BUN/CREAT SERPL: 16.1 (ref 7–25)
CALCIUM SPEC-SCNC: 9.6 MG/DL (ref 8.6–10.5)
CHLORIDE SERPL-SCNC: 105 MMOL/L (ref 98–107)
CO2 SERPL-SCNC: 29.4 MMOL/L (ref 22–29)
CREAT SERPL-MCNC: 0.93 MG/DL (ref 0.76–1.27)
DEPRECATED RDW RBC AUTO: 46.9 FL (ref 37–54)
EGFRCR SERPLBLD CKD-EPI 2021: 94.6 ML/MIN/1.73
EOSINOPHIL # BLD AUTO: 0.06 10*3/MM3 (ref 0–0.4)
EOSINOPHIL NFR BLD AUTO: 1 % (ref 0.3–6.2)
ERYTHROCYTE [DISTWIDTH] IN BLOOD BY AUTOMATED COUNT: 15.3 % (ref 12.3–15.4)
GLUCOSE SERPL-MCNC: 100 MG/DL (ref 65–99)
HCT VFR BLD AUTO: 47 % (ref 37.5–51)
HGB BLD-MCNC: 15.2 G/DL (ref 13–17.7)
IMM GRANULOCYTES # BLD AUTO: 0.02 10*3/MM3 (ref 0–0.05)
IMM GRANULOCYTES NFR BLD AUTO: 0.3 % (ref 0–0.5)
LYMPHOCYTES # BLD AUTO: 1.82 10*3/MM3 (ref 0.7–3.1)
LYMPHOCYTES NFR BLD AUTO: 29.9 % (ref 19.6–45.3)
MCH RBC QN AUTO: 27.6 PG (ref 26.6–33)
MCHC RBC AUTO-ENTMCNC: 32.3 G/DL (ref 31.5–35.7)
MCV RBC AUTO: 85.3 FL (ref 79–97)
MONOCYTES # BLD AUTO: 0.32 10*3/MM3 (ref 0.1–0.9)
MONOCYTES NFR BLD AUTO: 5.3 % (ref 5–12)
NEUTROPHILS NFR BLD AUTO: 3.83 10*3/MM3 (ref 1.7–7)
NEUTROPHILS NFR BLD AUTO: 62.8 % (ref 42.7–76)
NRBC BLD AUTO-RTO: 0 /100 WBC (ref 0–0.2)
PLATELET # BLD AUTO: 150 10*3/MM3 (ref 140–450)
PMV BLD AUTO: 10.6 FL (ref 6–12)
POTASSIUM SERPL-SCNC: 4.3 MMOL/L (ref 3.5–5.2)
RBC # BLD AUTO: 5.51 10*6/MM3 (ref 4.14–5.8)
SODIUM SERPL-SCNC: 140 MMOL/L (ref 136–145)
WBC NRBC COR # BLD AUTO: 6.09 10*3/MM3 (ref 3.4–10.8)

## 2024-08-28 PROCEDURE — 85025 COMPLETE CBC W/AUTO DIFF WBC: CPT

## 2024-08-28 PROCEDURE — 36415 COLL VENOUS BLD VENIPUNCTURE: CPT

## 2024-08-28 PROCEDURE — 80048 BASIC METABOLIC PNL TOTAL CA: CPT

## 2024-08-30 ENCOUNTER — HOSPITAL ENCOUNTER (OUTPATIENT)
Facility: HOSPITAL | Age: 59
Setting detail: HOSPITAL OUTPATIENT SURGERY
Discharge: HOME | End: 2024-08-30
Attending: INTERNAL MEDICINE | Admitting: INTERNAL MEDICINE
Payer: COMMERCIAL

## 2024-08-30 ENCOUNTER — APPOINTMENT (OUTPATIENT)
Dept: CARDIOLOGY | Facility: HOSPITAL | Age: 59
End: 2024-08-30
Payer: COMMERCIAL

## 2024-08-30 ENCOUNTER — APPOINTMENT (OUTPATIENT)
Dept: CT IMAGING | Facility: HOSPITAL | Age: 59
End: 2024-08-30
Payer: COMMERCIAL

## 2024-08-30 VITALS
SYSTOLIC BLOOD PRESSURE: 105 MMHG | RESPIRATION RATE: 18 BRPM | OXYGEN SATURATION: 99 % | BODY MASS INDEX: 25.06 KG/M2 | WEIGHT: 185 LBS | HEART RATE: 59 BPM | TEMPERATURE: 98.1 F | HEIGHT: 72 IN | DIASTOLIC BLOOD PRESSURE: 76 MMHG

## 2024-08-30 DIAGNOSIS — R94.39 ABNORMAL NUCLEAR STRESS TEST: ICD-10-CM

## 2024-08-30 DIAGNOSIS — I20.89 ANGINA AT REST: ICD-10-CM

## 2024-08-30 LAB
AORTIC DIMENSIONLESS INDEX: 0.79 (DI)
BH CV ECHO MEAS - AO MAX PG: 5.2 MMHG
BH CV ECHO MEAS - AO MEAN PG: 3 MMHG
BH CV ECHO MEAS - AO V2 MAX: 114 CM/SEC
BH CV ECHO MEAS - AO V2 VTI: 23.3 CM
BH CV ECHO MEAS - AVA(I,D): 2.6 CM2
BH CV ECHO MEAS - EDV(MOD-SP4): 147 ML
BH CV ECHO MEAS - EF(MOD-SP4): 41.9 %
BH CV ECHO MEAS - ESV(MOD-SP4): 85.4 ML
BH CV ECHO MEAS - LAT PEAK E' VEL: 9.3 CM/SEC
BH CV ECHO MEAS - LV DIASTOLIC VOL/BSA (35-75): 71.3 CM2
BH CV ECHO MEAS - LV MAX PG: 3.2 MMHG
BH CV ECHO MEAS - LV MEAN PG: 2 MMHG
BH CV ECHO MEAS - LV SYSTOLIC VOL/BSA (12-30): 41.4 CM2
BH CV ECHO MEAS - LV V1 MAX: 90.1 CM/SEC
BH CV ECHO MEAS - LV V1 VTI: 19 CM
BH CV ECHO MEAS - LVOT AREA: 3.1 CM2
BH CV ECHO MEAS - LVOT DIAM: 2 CM
BH CV ECHO MEAS - MED PEAK E' VEL: 5.1 CM/SEC
BH CV ECHO MEAS - MV A MAX VEL: 70.7 CM/SEC
BH CV ECHO MEAS - MV DEC SLOPE: 209 CM/SEC2
BH CV ECHO MEAS - MV DEC TIME: 0.21 SEC
BH CV ECHO MEAS - MV E MAX VEL: 71.3 CM/SEC
BH CV ECHO MEAS - MV E/A: 1.01
BH CV ECHO MEAS - MV MAX PG: 2.43 MMHG
BH CV ECHO MEAS - MV MEAN PG: 1 MMHG
BH CV ECHO MEAS - MV P1/2T: 103.8 MSEC
BH CV ECHO MEAS - MV V2 VTI: 24.2 CM
BH CV ECHO MEAS - MVA(P1/2T): 2.12 CM2
BH CV ECHO MEAS - MVA(VTI): 2.47 CM2
BH CV ECHO MEAS - RAP SYSTOLE: 3 MMHG
BH CV ECHO MEAS - RVDD: 4.3 CM
BH CV ECHO MEAS - SV(LVOT): 59.7 ML
BH CV ECHO MEAS - SV(MOD-SP4): 61.6 ML
BH CV ECHO MEAS - SVI(LVOT): 29 ML/M2
BH CV ECHO MEAS - SVI(MOD-SP4): 29.9 ML/M2
BH CV ECHO MEAS - TAPSE (>1.6): 1.32 CM
BH CV ECHO MEASUREMENTS AVERAGE E/E' RATIO: 9.9
BH CV XLRA - TDI S': 7.6 CM/SEC
BH CV XLRA MEAS - DIST GSV CALF DIST LEFT: 0.24 CM
BH CV XLRA MEAS - DIST GSV CALF DIST RIGHT: 0.23 CM
BH CV XLRA MEAS - DIST GSV THIGH DIST LEFT: 0.38 CM
BH CV XLRA MEAS - MID GSV CALF LEFT: 0.24 CM
BH CV XLRA MEAS - MID GSV CALF RIGHT: 0.24 CM
BH CV XLRA MEAS - MID GSV THIGH  LEFT: 0.37 CM
BH CV XLRA MEAS - PROX GSV CALF DIST LEFT: 0.34 CM
BH CV XLRA MEAS - PROX GSV CALF DIST RIGHT: 0.3 CM
BH CV XLRA MEAS - PROX GSV THIGH  LEFT: 0.31 CM
BH CV XLRA MEAS LEFT CAROTID BULB PSV: -62.1 CM/SEC
BH CV XLRA MEAS LEFT DIST CCA EDV: -14.9 CM/SEC
BH CV XLRA MEAS LEFT DIST CCA PSV: -73.8 CM/SEC
BH CV XLRA MEAS LEFT DIST ICA EDV: -23.6 CM/SEC
BH CV XLRA MEAS LEFT DIST ICA PSV: -72.7 CM/SEC
BH CV XLRA MEAS LEFT ICA/CCA RATIO: -0.7
BH CV XLRA MEAS LEFT PROX CCA EDV: 16.5 CM/SEC
BH CV XLRA MEAS LEFT PROX CCA PSV: 104 CM/SEC
BH CV XLRA MEAS LEFT PROX ECA PSV: -85.7 CM/SEC
BH CV XLRA MEAS LEFT PROX ICA EDV: -19.3 CM/SEC
BH CV XLRA MEAS LEFT PROX ICA PSV: -61.5 CM/SEC
BH CV XLRA MEAS LEFT PROX SCLA PSV: 121 CM/SEC
BH CV XLRA MEAS LEFT VERTEBRAL A PSV: -46 CM/SEC
BH CV XLRA MEAS RIGHT CAROTID BULB PSV: -66.3 CM/SEC
BH CV XLRA MEAS RIGHT DIST CCA EDV: -14.7 CM/SEC
BH CV XLRA MEAS RIGHT DIST CCA PSV: -66.8 CM/SEC
BH CV XLRA MEAS RIGHT DIST ICA EDV: -22.8 CM/SEC
BH CV XLRA MEAS RIGHT DIST ICA PSV: -70.2 CM/SEC
BH CV XLRA MEAS RIGHT ICA/CCA RATIO: -0.76
BH CV XLRA MEAS RIGHT PROX CCA EDV: 12.4 CM/SEC
BH CV XLRA MEAS RIGHT PROX CCA PSV: 91.9 CM/SEC
BH CV XLRA MEAS RIGHT PROX ECA PSV: -86.5 CM/SEC
BH CV XLRA MEAS RIGHT PROX ICA EDV: -20.2 CM/SEC
BH CV XLRA MEAS RIGHT PROX ICA PSV: -52.7 CM/SEC
BH CV XLRA MEAS RIGHT PROX SCLA PSV: 110 CM/SEC
LEFT ARM BP: NORMAL MMHG
LEFT ATRIUM VOLUME INDEX: 24.8 ML/M2
RIGHT ARM BP: NORMAL MMHG

## 2024-08-30 PROCEDURE — 25510000001 IOPAMIDOL PER 1 ML: Performed by: INTERNAL MEDICINE

## 2024-08-30 PROCEDURE — C1769 GUIDE WIRE: HCPCS | Performed by: INTERNAL MEDICINE

## 2024-08-30 PROCEDURE — 93306 TTE W/DOPPLER COMPLETE: CPT

## 2024-08-30 PROCEDURE — 71250 CT THORAX DX C-: CPT

## 2024-08-30 PROCEDURE — C1894 INTRO/SHEATH, NON-LASER: HCPCS | Performed by: INTERNAL MEDICINE

## 2024-08-30 PROCEDURE — 99152 MOD SED SAME PHYS/QHP 5/>YRS: CPT | Performed by: INTERNAL MEDICINE

## 2024-08-30 PROCEDURE — 25010000002 LIDOCAINE 1 % SOLUTION: Performed by: INTERNAL MEDICINE

## 2024-08-30 PROCEDURE — 93459 L HRT ART/GRFT ANGIO: CPT | Performed by: INTERNAL MEDICINE

## 2024-08-30 PROCEDURE — 25010000002 MIDAZOLAM PER 1 MG: Performed by: INTERNAL MEDICINE

## 2024-08-30 PROCEDURE — 99153 MOD SED SAME PHYS/QHP EA: CPT | Performed by: INTERNAL MEDICINE

## 2024-08-30 PROCEDURE — 93880 EXTRACRANIAL BILAT STUDY: CPT

## 2024-08-30 PROCEDURE — 93306 TTE W/DOPPLER COMPLETE: CPT | Performed by: INTERNAL MEDICINE

## 2024-08-30 PROCEDURE — 93970 EXTREMITY STUDY: CPT | Performed by: SURGERY

## 2024-08-30 PROCEDURE — 93970 EXTREMITY STUDY: CPT

## 2024-08-30 PROCEDURE — 25010000002 FENTANYL CITRATE (PF) 100 MCG/2ML SOLUTION: Performed by: INTERNAL MEDICINE

## 2024-08-30 PROCEDURE — 93880 EXTRACRANIAL BILAT STUDY: CPT | Performed by: SURGERY

## 2024-08-30 RX ORDER — SODIUM CHLORIDE 9 MG/ML
75 INJECTION, SOLUTION INTRAVENOUS CONTINUOUS
Status: DISCONTINUED | OUTPATIENT
Start: 2024-08-30 | End: 2024-08-30 | Stop reason: HOSPADM

## 2024-08-30 RX ORDER — LIDOCAINE HYDROCHLORIDE 10 MG/ML
INJECTION, SOLUTION INFILTRATION; PERINEURAL
Status: DISCONTINUED | OUTPATIENT
Start: 2024-08-30 | End: 2024-08-30 | Stop reason: HOSPADM

## 2024-08-30 RX ORDER — INSULIN HUMAN 500 [IU]/ML
120 INJECTION, SOLUTION SUBCUTANEOUS
COMMUNITY

## 2024-08-30 RX ORDER — ACETAMINOPHEN 325 MG/1
650 TABLET ORAL EVERY 4 HOURS PRN
Status: DISCONTINUED | OUTPATIENT
Start: 2024-08-30 | End: 2024-08-30 | Stop reason: HOSPADM

## 2024-08-30 RX ORDER — SODIUM CHLORIDE 9 MG/ML
250 INJECTION, SOLUTION INTRAVENOUS ONCE AS NEEDED
Status: DISCONTINUED | OUTPATIENT
Start: 2024-08-30 | End: 2024-08-30 | Stop reason: HOSPADM

## 2024-08-30 RX ORDER — IOPAMIDOL 755 MG/ML
INJECTION, SOLUTION INTRAVASCULAR
Status: DISCONTINUED | OUTPATIENT
Start: 2024-08-30 | End: 2024-08-30 | Stop reason: HOSPADM

## 2024-08-30 RX ORDER — METOPROLOL TARTRATE 50 MG
75 TABLET ORAL 2 TIMES DAILY
COMMUNITY

## 2024-08-30 RX ORDER — FENOFIBRATE 160 MG/1
160 TABLET ORAL DAILY
COMMUNITY

## 2024-08-30 RX ORDER — NITROGLYCERIN 0.4 MG/1
0.4 TABLET SUBLINGUAL
Status: DISCONTINUED | OUTPATIENT
Start: 2024-08-30 | End: 2024-08-30 | Stop reason: HOSPADM

## 2024-08-30 RX ORDER — INSULIN HUMAN 500 [IU]/ML
130 INJECTION, SOLUTION SUBCUTANEOUS 2 TIMES DAILY
COMMUNITY

## 2024-08-30 RX ORDER — CALCIUM CARBONATE 500(1250)
500 TABLET ORAL DAILY
COMMUNITY

## 2024-08-30 RX ORDER — MIDAZOLAM HYDROCHLORIDE 1 MG/ML
INJECTION INTRAMUSCULAR; INTRAVENOUS
Status: DISCONTINUED | OUTPATIENT
Start: 2024-08-30 | End: 2024-08-30 | Stop reason: HOSPADM

## 2024-08-30 RX ORDER — FENTANYL CITRATE 50 UG/ML
INJECTION, SOLUTION INTRAMUSCULAR; INTRAVENOUS
Status: DISCONTINUED | OUTPATIENT
Start: 2024-08-30 | End: 2024-08-30 | Stop reason: HOSPADM

## 2024-08-30 NOTE — CONSULTS
Patient Care Team:  Prabhu Downing MD as PCP - General  Prabhu Downing MD as PCP - Family Medicine  Ham Jacinto MD as Consulting Physician (Cardiology)  Referring Provider:  Dr. Jacinto  Reason for consultation:  Progressive CAD, s/p CABG    Chief complaint:  chest pain    Subjective     History of Present Illness:  59 y/o gentleman presented today for an elective heart cath after an abnormal stress test showing a large-sized, severe area of ischemia located in the inferior wall and lateral wall with an EF 43%.  He reports he was doing well until he started developing chest pain/tightness and fatigue while mowing the grass in the summer heat.  He has a hx of CABG, working on getting op note.  He also has HTN, HLD, and DM type 2.  He has never smoked.  Cardiac cath completed today and pt referred for surgical evaluation for reop CABG.      Review of Systems   Constitutional:  Positive for fatigue.   Respiratory:  Positive for chest tightness. Negative for shortness of breath.    Cardiovascular:  Positive for chest pain. Negative for palpitations and leg swelling.   Neurological:  Negative for dizziness and light-headedness.        Past Medical History:   Diagnosis Date    CAD (coronary artery disease)     Hyperlipidemia     Hypertension     Type 2 diabetes mellitus      Past Surgical History:   Procedure Laterality Date    APPENDECTOMY      CHOLECYSTECTOMY      CORONARY ARTERY BYPASS GRAFT      2010     Family History   Problem Relation Age of Onset    Heart attack Mother     Alcohol abuse Father      Social History     Tobacco Use    Smoking status: Never    Smokeless tobacco: Never   Vaping Use    Vaping status: Never Used   Substance Use Topics    Alcohol use: No    Drug use: Defer     Medications Prior to Admission   Medication Sig Dispense Refill Last Dose    Apple Cider Vinegar 188 MG capsule Take 1 capsule by mouth Daily.   8/29/2024    aspirin 81 MG tablet Take 1 tablet by mouth Daily.   8/29/2024 at 0800  "   calcium carbonate, oyster shell, 500 MG tablet tablet Take 1 tablet by mouth Daily.   8/29/2024    cholestyramine light (PREVALITE) 4 GM/DOSE powder Take 1 packet by mouth 2 (Two) Times a Day With Meals. 180 g 6 8/29/2024 at 1700    empagliflozin (Jardiance) 25 MG tablet tablet Take 1 tablet by mouth once daily 90 tablet 3 8/29/2024 at 0800    fenofibrate 160 MG tablet Take 1 tablet by mouth Daily.   8/29/2024 at 0800    Insulin Regular Human, Conc, (HumuLIN R U-500 KwikPen) 500 UNIT/ML solution pen-injector CONCENTRATED injection Inject 130 Units under the skin into the appropriate area as directed 2 (Two) Times a Day. With Meals   8/29/2024 at 1200    Insulin Regular Human, Conc, (HumuLIN R U-500 KwikPen) 500 UNIT/ML solution pen-injector CONCENTRATED injection Inject 120 Units under the skin into the appropriate area as directed Daily With Dinner.   8/29/2024    metFORMIN (GLUCOPHAGE) 1000 MG tablet TAKE ONE TABLET BY MOUTH TWICE A DAY WITH MEALS 180 tablet 4     metFORMIN (GLUCOPHAGE) 1000 MG tablet Take 1 tablet by mouth 2 (Two) Times a Day With Meals.   8/29/2024 at 1700    metoprolol tartrate (LOPRESSOR) 50 MG tablet Take 1.5 tablets by mouth 2 (Two) Times a Day.   8/30/2024    Omega-3 Fatty Acids (OMEGA-3 2100 PO) Take 1 capsule by mouth Daily.   Past Week    RED YEAST RICE EXTRACT PO Take 1 capsule by mouth Daily.       rosuvastatin (CRESTOR) 40 MG tablet Take 1 tablet by mouth once daily 90 tablet 3         Allergies:  Levofloxacin    Objective      Vital Signs  Temp:  [98.1 °F (36.7 °C)] 98.1 °F (36.7 °C)  Heart Rate:  [53-71] 71  Resp:  [18] 18  BP: (114-131)/(73-82) 131/82    Flowsheet Rows      Flowsheet Row First Filed Value   Admission Height 182.9 cm (72\") Documented at 08/30/2024 1055   Admission Weight 84.2 kg (185 lb 10 oz) Documented at 08/30/2024 1055          182.9 cm (72\")    Physical Exam  Vitals and nursing note reviewed.   Constitutional:       General: He is awake.      Appearance: " Normal appearance. He is well-developed and well-groomed.      Comments: Seen in [facility].  No family present.  Nurse present.   HENT:      Head: Normocephalic and atraumatic.      Nose: Nose normal.      Mouth/Throat:      Lips: Pink.      Mouth: Mucous membranes are moist.      Pharynx: Uvula midline.   Eyes:      General: Lids are normal. No scleral icterus.     Extraocular Movements: Extraocular movements intact.      Conjunctiva/sclera: Conjunctivae normal.      Pupils: Pupils are equal, round, and reactive to light.   Neck:      Thyroid: No thyroid mass or thyromegaly.      Vascular: Normal carotid pulses. No carotid bruit, hepatojugular reflux or JVD.      Trachea: Trachea normal.   Cardiovascular:      Rate and Rhythm: Normal rate and regular rhythm.      Pulses:           Carotid pulses are 2+ on the right side and 2+ on the left side.       Radial pulses are 2+ on the right side and 2+ on the left side.        Femoral pulses are 2+ on the right side and 2+ on the left side.       Popliteal pulses are 2+ on the right side and 2+ on the left side.        Dorsalis pedis pulses are 2+ on the right side and 2+ on the left side.        Posterior tibial pulses are 2+ on the right side and 2+ on the left side.      Heart sounds: Normal heart sounds. No murmur heard.     Comments: Right femoral cath sheath  Tele:  SR 60s  Pulmonary:      Effort: Pulmonary effort is normal.      Breath sounds: Normal breath sounds.   Abdominal:      General: Abdomen is protuberant. Bowel sounds are normal. There is no distension.      Palpations: Abdomen is soft.      Tenderness: There is no abdominal tenderness.   Musculoskeletal:      Cervical back: Neck supple.      Right lower leg: No edema.      Left lower leg: No edema.      Comments: Gait steady and strong without use of assistive devices   Lymphadenopathy:      Cervical: No cervical adenopathy.      Upper Body:      Right upper body: No supraclavicular adenopathy.      Left  upper body: No supraclavicular adenopathy.   Skin:     General: Skin is warm and dry.      Capillary Refill: Capillary refill takes less than 2 seconds.      Findings: No erythema or rash.      Nails: There is no clubbing.      Comments: Sternotomy/ SVHS well healed.  Skin has a eli appearance.   Neurological:      Mental Status: He is alert and oriented to person, place, and time.      GCS: GCS eye subscore is 4. GCS verbal subscore is 5. GCS motor subscore is 6.   Psychiatric:         Attention and Perception: Attention and perception normal.         Mood and Affect: Mood and affect normal.         Speech: Speech normal.         Behavior: Behavior normal. Behavior is cooperative.         Thought Content: Thought content normal.         Cognition and Memory: Cognition and memory normal.         Judgment: Judgment normal.         Results Review:   Lab Results (last 24 hours)       ** No results found for the last 24 hours. **            Cardiac cath per Dr. Jacinto 8/30/2024  Report pending      Assessment & Plan       Angina at rest    Abnormal nuclear stress test      Assessment & Plan    - MV CAD, hx off pump CABG x3 with LIMA to LAD, SVG to distal RCA, SVG to 1st diagonal (Vishnu, 6/2011), EF 43% (stress test)--evaluation for reop CABG  - HTN--stable  - HLD--statin/ fenofibrate, cholestyramine  - DM type 2--followed by Dr. Mendenhall, on Jardiance/ metformin/ U-500 insulin  - Hypertriglyceridemia with hx pancreatitis--last trigly 1056 (3/2024)    Dr. Mares evaluated cath films and patient.  Patient is followed by Dr. Mendenhall for his DM type 2 and lipids.  He is on U-500 insulin 120 units 3 times a day currently.  We are going to see if Dr. Mendenhall can see him preop to optimize his sugars/lipids.  Dr. Mares recommended reop CABG if preop studies are adequate.  Carotid duplex, vein mapping, echo, and CT chest without ordered.  Ideally all would be completed before discharge today.  Wife was present for discussion  regarding reop CABG.  Pt/wife are in agreement for workup for reop CABG.      Thank you for allowing us to participate in the care of this patient.      Anamika Castle, JYOTHI  08/30/24  12:57 EDT    **all problems new to this examiner  **EKG and CXR independently reviewed and interpreted

## 2024-08-30 NOTE — H&P
Patient Care Team:  Prabhu Downing MD as PCP - General  Prabhu Downing MD as PCP - Family Medicine  Ham Jacinto MD as Consulting Physician (Cardiology)    Chief complaint chest pain    Subjective     History of Present Illness 50-year-old with chest pain and abnormal Myoview    Review of Systems   Respiratory:  Positive for chest tightness.    Cardiovascular:  Positive for chest pain.          Past Medical History:   Diagnosis Date    CAD (coronary artery disease)     Hyperlipidemia     Hypertension     Type 2 diabetes mellitus        Objective      Vital Signs  Temp:  [98.1 °F (36.7 °C)] 98.1 °F (36.7 °C)  Heart Rate:  [53-71] 71  Resp:  [18] 18  BP: (114-131)/(73-82) 131/82    Physical Exam  HENT:      Head: Normocephalic and atraumatic.   Eyes:      Pupils: Pupils are equal, round, and reactive to light.   Cardiovascular:      Rate and Rhythm: Normal rate and regular rhythm.      Pulses: Normal pulses.   Pulmonary:      Effort: Pulmonary effort is normal.   Abdominal:      Palpations: Abdomen is soft.   Skin:     General: Skin is warm.   Neurological:      General: No focal deficit present.      Mental Status: He is alert.         Results Review:    I reviewed the patient's new clinical results.      Assessment & Plan       Angina at rest    Abnormal nuclear stress test      Assessment & Plan plan for cardiac catheterization    I discussed the patients findings and my recommendations with patient    Ham Jacinto MD  08/30/24  13:11 EDT    Time:

## 2024-09-04 ENCOUNTER — PREP FOR SURGERY (OUTPATIENT)
Dept: OTHER | Facility: HOSPITAL | Age: 59
End: 2024-09-04
Payer: COMMERCIAL

## 2024-09-04 DIAGNOSIS — I25.708 CORONARY ARTERY DISEASE OF BYPASS GRAFT OF NATIVE HEART WITH STABLE ANGINA PECTORIS: Primary | ICD-10-CM

## 2024-09-04 RX ORDER — DEXTROSE MONOHYDRATE 25 G/50ML
10-50 INJECTION, SOLUTION INTRAVENOUS
OUTPATIENT
Start: 2024-09-04

## 2024-09-04 RX ORDER — IBUPROFEN 600 MG/1
1 TABLET ORAL
OUTPATIENT
Start: 2024-09-04

## 2024-09-04 RX ORDER — CHLORHEXIDINE GLUCONATE 500 MG/1
1 CLOTH TOPICAL EVERY 12 HOURS PRN
OUTPATIENT
Start: 2024-09-04

## 2024-09-04 RX ORDER — NICOTINE POLACRILEX 4 MG
15 LOZENGE BUCCAL
OUTPATIENT
Start: 2024-09-04

## 2024-09-04 RX ORDER — CHLORHEXIDINE GLUCONATE ORAL RINSE 1.2 MG/ML
15 SOLUTION DENTAL EVERY 12 HOURS
OUTPATIENT
Start: 2024-09-04 | End: 2024-09-05

## 2024-09-04 RX ORDER — SODIUM CHLORIDE 0.9 % (FLUSH) 0.9 %
30 SYRINGE (ML) INJECTION ONCE AS NEEDED
OUTPATIENT
Start: 2024-09-04

## 2024-09-04 RX ORDER — METOPROLOL TARTRATE 25 MG/1
12.5 TABLET, FILM COATED ORAL
OUTPATIENT
Start: 2024-09-05 | End: 2024-09-06

## 2024-09-04 RX ORDER — CHLORHEXIDINE GLUCONATE ORAL RINSE 1.2 MG/ML
15 SOLUTION DENTAL ONCE
OUTPATIENT
Start: 2024-09-04 | End: 2024-09-04

## 2024-09-04 RX ORDER — SODIUM CHLORIDE 9 MG/ML
30 INJECTION, SOLUTION INTRAVENOUS CONTINUOUS PRN
OUTPATIENT
Start: 2024-09-04

## 2024-09-05 ENCOUNTER — TELEPHONE (OUTPATIENT)
Dept: CARDIOLOGY | Facility: CLINIC | Age: 59
End: 2024-09-05
Payer: COMMERCIAL

## 2024-09-05 DIAGNOSIS — Z01.818 PRE-OP TESTING: Primary | ICD-10-CM

## 2024-09-05 RX ORDER — NITROGLYCERIN 0.4 MG/1
TABLET SUBLINGUAL
Qty: 25 TABLET | Refills: 1 | Status: SHIPPED | OUTPATIENT
Start: 2024-09-05

## 2024-09-05 NOTE — TELEPHONE ENCOUNTER
Rx Refill Note  Requested Prescriptions     Pending Prescriptions Disp Refills    nitroglycerin (NITROSTAT) 0.4 MG SL tablet 25 tablet 1     Si under the tongue as needed for angina, may repeat q5mins for up three doses      Last office visit with prescribing clinician: 2024   Last telemedicine visit with prescribing clinician: Visit date not found   Next office visit with prescribing clinician: 10/28/2024                         Would you like a call back once the refill request has been completed: [] Yes [] No    If the office needs to give you a call back, can they leave a voicemail: [] Yes [] No    Collette Mccarthy MA  24, 12:46 EDT

## 2024-09-05 NOTE — TELEPHONE ENCOUNTER
Caller: Rodney Chaney    Relationship to patient: Self    Best call back number: [phone]    Patient is needing: PATIENT CALLED ABOUT THE [facility] PAPERWORK THAT WAS SUPPOSED TO BE RECEIVED AT THE OFFICE YESTERDAY. PLEASE CALL PATIENT WHEN PAPERWORK IS COMPLETED AND SENT BACK.     PATIENT ALSO HAS INSTRUCTIONS TO CALL DR. HOLLINS OFFICE TO GET A PRESCRIPTION FOR NITROGLYCERIN MEDICATION. PLEASE CALL PATIENT TO ADVISE.

## 2024-09-05 NOTE — TELEPHONE ENCOUNTER
Called patient and verbally explained we have not received the paperwork but we will call him once we receive the paperwork and I did verify the fax number that they faxed it to and it is correct.    Verbally expressed as well that we have 2 weeks to fill out the forms once we receive them.    Patient verbally understood and I did send in his nitro to correct local pharmacy.

## 2024-09-05 NOTE — TELEPHONE ENCOUNTER
Called patient and verbally explained we have received the FMLA forms and we have 2 weeks from today to complete the forms.    Patient verbally understood and had no further questions or concerns at this time.

## 2024-09-09 ENCOUNTER — TELEPHONE (OUTPATIENT)
Dept: CARDIAC SURGERY | Facility: CLINIC | Age: 59
End: 2024-09-09
Payer: COMMERCIAL

## 2024-09-09 NOTE — TELEPHONE ENCOUNTER
Patient is having coronary artery bypass graft with Dr Mares 9/26/24 and had a cath 8/30/24 and I will need to know the dates to fill out and the restrictions for his FMLA forms.    Patient will be coming in for an appointment 10/28/24

## 2024-09-09 NOTE — TELEPHONE ENCOUNTER
Let patient know we can cover forms from cardiac cath to surgery.  However, following surgery CT surgery's office should fill out FMLA forms as they are the ones to clear them to go back to work following open heart

## 2024-09-09 NOTE — TELEPHONE ENCOUNTER
Spoke with pt surgery scheduled for 09/26/2024 5am arrival. LILIYA Santoyo to schedule PAT . Pt stated this had been scheduled for 09/24/2024 @8am.    Advised pt to stop jardiance, and fish oil 1 wk prior.

## 2024-09-23 ENCOUNTER — OFFICE VISIT (OUTPATIENT)
Dept: ENDOCRINOLOGY | Facility: CLINIC | Age: 59
End: 2024-09-23
Payer: COMMERCIAL

## 2024-09-23 VITALS
BODY MASS INDEX: 25.47 KG/M2 | WEIGHT: 188 LBS | HEIGHT: 72 IN | HEART RATE: 82 BPM | SYSTOLIC BLOOD PRESSURE: 110 MMHG | DIASTOLIC BLOOD PRESSURE: 80 MMHG | OXYGEN SATURATION: 99 %

## 2024-09-23 DIAGNOSIS — E11.65 TYPE 2 DIABETES MELLITUS WITH HYPERGLYCEMIA, WITHOUT LONG-TERM CURRENT USE OF INSULIN: Primary | ICD-10-CM

## 2024-09-23 DIAGNOSIS — E11.21 DIABETIC NEPHROPATHY ASSOCIATED WITH TYPE 2 DIABETES MELLITUS: ICD-10-CM

## 2024-09-23 DIAGNOSIS — I10 ESSENTIAL (PRIMARY) HYPERTENSION: ICD-10-CM

## 2024-09-23 DIAGNOSIS — E78.2 MIXED HYPERLIPIDEMIA: ICD-10-CM

## 2024-09-23 LAB — GLUCOSE BLDC GLUCOMTR-MCNC: 138 MG/DL (ref 70–105)

## 2024-09-23 PROCEDURE — 99214 OFFICE O/P EST MOD 30 MIN: CPT | Performed by: INTERNAL MEDICINE

## 2024-09-23 PROCEDURE — 82948 REAGENT STRIP/BLOOD GLUCOSE: CPT | Performed by: INTERNAL MEDICINE

## 2024-09-23 RX ORDER — ACYCLOVIR 400 MG/1
TABLET ORAL
Status: ON HOLD | COMMUNITY
Start: 2024-09-18

## 2024-09-23 RX ORDER — FENOFIBRATE 160 MG/1
160 TABLET ORAL DAILY
Qty: 90 TABLET | Refills: 4 | Status: ON HOLD | OUTPATIENT
Start: 2024-09-23

## 2024-09-24 ENCOUNTER — HOSPITAL ENCOUNTER (OUTPATIENT)
Dept: GENERAL RADIOLOGY | Facility: HOSPITAL | Age: 59
Discharge: HOME | End: 2024-09-24
Payer: COMMERCIAL

## 2024-09-24 ENCOUNTER — PRE-ADMISSION TESTING (OUTPATIENT)
Dept: PREADMISSION TESTING | Facility: HOSPITAL | Age: 59
End: 2024-09-24
Payer: COMMERCIAL

## 2024-09-24 ENCOUNTER — LAB (OUTPATIENT)
Dept: LAB | Facility: HOSPITAL | Age: 59
End: 2024-09-24
Payer: COMMERCIAL

## 2024-09-24 ENCOUNTER — HOSPITAL ENCOUNTER (OUTPATIENT)
Dept: CARDIOLOGY | Facility: HOSPITAL | Age: 59
Discharge: HOME | End: 2024-09-24
Payer: COMMERCIAL

## 2024-09-24 ENCOUNTER — APPOINTMENT (OUTPATIENT)
Dept: RESPIRATORY THERAPY | Facility: HOSPITAL | Age: 59
End: 2024-09-24
Payer: COMMERCIAL

## 2024-09-24 VITALS
DIASTOLIC BLOOD PRESSURE: 78 MMHG | HEART RATE: 61 BPM | SYSTOLIC BLOOD PRESSURE: 128 MMHG | RESPIRATION RATE: 20 BRPM | OXYGEN SATURATION: 98 % | TEMPERATURE: 97.6 F

## 2024-09-24 DIAGNOSIS — Z01.818 PRE-OP TESTING: ICD-10-CM

## 2024-09-24 DIAGNOSIS — I25.708 CORONARY ARTERY DISEASE OF BYPASS GRAFT OF NATIVE HEART WITH STABLE ANGINA PECTORIS: ICD-10-CM

## 2024-09-24 LAB
ABO GROUP BLD: NORMAL
ALBUMIN SERPL-MCNC: 4.6 G/DL (ref 3.5–5.2)
ALBUMIN/GLOB SERPL: 2.2 G/DL
ALP SERPL-CCNC: 54 U/L (ref 39–117)
ALT SERPL W P-5'-P-CCNC: 28 U/L (ref 1–41)
ANION GAP SERPL CALCULATED.3IONS-SCNC: 7.6 MMOL/L (ref 5–15)
APTT PPP: 23.4 SECONDS (ref 24–31)
ARTERIAL PATENCY WRIST A: POSITIVE
AST SERPL-CCNC: 25 U/L (ref 1–40)
ATMOSPHERIC PRESS: ABNORMAL MM[HG]
BACTERIA UR QL AUTO: NORMAL /HPF
BASE EXCESS BLDA CALC-SCNC: -0.3 MMOL/L (ref 0–3)
BASOPHILS # BLD AUTO: 0.03 10*3/MM3 (ref 0–0.2)
BASOPHILS NFR BLD AUTO: 0.6 % (ref 0–1.5)
BDY SITE: ABNORMAL
BILIRUB SERPL-MCNC: 0.4 MG/DL (ref 0–1.2)
BILIRUB UR QL STRIP: NEGATIVE
BLD GP AB SCN SERPL QL: NEGATIVE
BUN SERPL-MCNC: 12 MG/DL (ref 6–20)
BUN/CREAT SERPL: 13.6 (ref 7–25)
CALCIUM SPEC-SCNC: 9.4 MG/DL (ref 8.6–10.5)
CHLORIDE SERPL-SCNC: 100 MMOL/L (ref 98–107)
CLARITY UR: CLEAR
CLOSE TME COLL+ADP + EPINEP PNL BLD: 98 % (ref 86–100)
CO2 BLDA-SCNC: 25.4 MMOL/L (ref 22–29)
CO2 SERPL-SCNC: 29.4 MMOL/L (ref 22–29)
COLOR UR: YELLOW
CREAT SERPL-MCNC: 0.88 MG/DL (ref 0.76–1.27)
DEPRECATED RDW RBC AUTO: 47.8 FL (ref 37–54)
EGFRCR SERPLBLD CKD-EPI 2021: 99.1 ML/MIN/1.73
EOSINOPHIL # BLD AUTO: 0.11 10*3/MM3 (ref 0–0.4)
EOSINOPHIL NFR BLD AUTO: 2.1 % (ref 0.3–6.2)
ERYTHROCYTE [DISTWIDTH] IN BLOOD BY AUTOMATED COUNT: 14.8 % (ref 12.3–15.4)
GLOBULIN UR ELPH-MCNC: 2.1 GM/DL
GLUCOSE SERPL-MCNC: 220 MG/DL (ref 65–99)
GLUCOSE UR STRIP-MCNC: ABNORMAL MG/DL
HCO3 BLDA-SCNC: 24.2 MMOL/L (ref 21–28)
HCT VFR BLD AUTO: 48.3 % (ref 37.5–51)
HEMODILUTION: NO
HGB BLD-MCNC: 15.4 G/DL (ref 13–17.7)
HGB UR QL STRIP.AUTO: NEGATIVE
HYALINE CASTS UR QL AUTO: NORMAL /LPF
IMM GRANULOCYTES # BLD AUTO: 0.02 10*3/MM3 (ref 0–0.05)
IMM GRANULOCYTES NFR BLD AUTO: 0.4 % (ref 0–0.5)
INHALED O2 CONCENTRATION: 21 %
INR PPP: 1.08 (ref 0.93–1.1)
KETONES UR QL STRIP: NEGATIVE
LEUKOCYTE ESTERASE UR QL STRIP.AUTO: ABNORMAL
LYMPHOCYTES # BLD AUTO: 1.62 10*3/MM3 (ref 0.7–3.1)
LYMPHOCYTES NFR BLD AUTO: 31.3 % (ref 19.6–45.3)
MAGNESIUM SERPL-MCNC: 1.8 MG/DL (ref 1.6–2.6)
MCH RBC QN AUTO: 27.9 PG (ref 26.6–33)
MCHC RBC AUTO-ENTMCNC: 31.9 G/DL (ref 31.5–35.7)
MCV RBC AUTO: 87.7 FL (ref 79–97)
MODALITY: ABNORMAL
MONOCYTES # BLD AUTO: 0.32 10*3/MM3 (ref 0.1–0.9)
MONOCYTES NFR BLD AUTO: 6.2 % (ref 5–12)
MRSA DNA SPEC QL NAA+PROBE: NORMAL
NEUTROPHILS NFR BLD AUTO: 3.08 10*3/MM3 (ref 1.7–7)
NEUTROPHILS NFR BLD AUTO: 59.4 % (ref 42.7–76)
NITRITE UR QL STRIP: NEGATIVE
NRBC BLD AUTO-RTO: 0 /100 WBC (ref 0–0.2)
NT-PROBNP SERPL-MCNC: 460 PG/ML (ref 0–900)
PCO2 BLDA: 38.4 MM HG (ref 35–48)
PH BLDA: 7.41 PH UNITS (ref 7.35–7.45)
PH UR STRIP.AUTO: 5.5 [PH] (ref 5–8)
PLATELET # BLD AUTO: 131 10*3/MM3 (ref 140–450)
PMV BLD AUTO: 10 FL (ref 6–12)
PO2 BLD: 444 MM[HG] (ref 0–500)
PO2 BLDA: 93.2 MM HG (ref 83–108)
POTASSIUM SERPL-SCNC: 5.1 MMOL/L (ref 3.5–5.2)
PROT SERPL-MCNC: 6.7 G/DL (ref 6–8.5)
PROT UR QL STRIP: NEGATIVE
PROTHROMBIN TIME: 11.7 SECONDS (ref 9.6–11.7)
QT INTERVAL: 413 MS
QTC INTERVAL: 407 MS
RBC # BLD AUTO: 5.51 10*6/MM3 (ref 4.14–5.8)
RBC # UR STRIP: NORMAL /HPF
RBC MORPH BLD: NORMAL
REF LAB TEST METHOD: NORMAL
RH BLD: POSITIVE
SAO2 % BLDCOA: 97.3 % (ref 94–98)
SARS-COV-2 RNA RESP QL NAA+PROBE: NOT DETECTED
SMALL PLATELETS BLD QL SMEAR: ADEQUATE
SODIUM SERPL-SCNC: 137 MMOL/L (ref 136–145)
SP GR UR STRIP: 1.02 (ref 1–1.03)
SQUAMOUS #/AREA URNS HPF: NORMAL /HPF
T&S EXPIRATION DATE: NORMAL
UROBILINOGEN UR QL STRIP: ABNORMAL
WBC # UR STRIP: NORMAL /HPF
WBC MORPH BLD: NORMAL
WBC NRBC COR # BLD AUTO: 5.18 10*3/MM3 (ref 3.4–10.8)

## 2024-09-24 PROCEDURE — 86923 COMPATIBILITY TEST ELECTRIC: CPT

## 2024-09-24 PROCEDURE — 71046 X-RAY EXAM CHEST 2 VIEWS: CPT

## 2024-09-24 PROCEDURE — 93005 ELECTROCARDIOGRAM TRACING: CPT | Performed by: PHYSICIAN ASSISTANT

## 2024-09-24 PROCEDURE — 36600 WITHDRAWAL OF ARTERIAL BLOOD: CPT | Performed by: FAMILY MEDICINE

## 2024-09-24 PROCEDURE — 85730 THROMBOPLASTIN TIME PARTIAL: CPT

## 2024-09-24 PROCEDURE — 83880 ASSAY OF NATRIURETIC PEPTIDE: CPT

## 2024-09-24 PROCEDURE — 82803 BLOOD GASES ANY COMBINATION: CPT | Performed by: FAMILY MEDICINE

## 2024-09-24 PROCEDURE — 86850 RBC ANTIBODY SCREEN: CPT

## 2024-09-24 PROCEDURE — 85576 BLOOD PLATELET AGGREGATION: CPT

## 2024-09-24 PROCEDURE — 86900 BLOOD TYPING SEROLOGIC ABO: CPT

## 2024-09-24 PROCEDURE — 80053 COMPREHEN METABOLIC PANEL: CPT

## 2024-09-24 PROCEDURE — 81001 URINALYSIS AUTO W/SCOPE: CPT

## 2024-09-24 PROCEDURE — 85610 PROTHROMBIN TIME: CPT

## 2024-09-24 PROCEDURE — 86901 BLOOD TYPING SEROLOGIC RH(D): CPT

## 2024-09-24 PROCEDURE — 83735 ASSAY OF MAGNESIUM: CPT

## 2024-09-24 PROCEDURE — 85007 BL SMEAR W/DIFF WBC COUNT: CPT

## 2024-09-24 PROCEDURE — S0260 H&P FOR SURGERY: HCPCS | Performed by: THORACIC SURGERY (CARDIOTHORACIC VASCULAR SURGERY)

## 2024-09-24 PROCEDURE — 93010 ELECTROCARDIOGRAM REPORT: CPT | Performed by: INTERNAL MEDICINE

## 2024-09-24 PROCEDURE — 85025 COMPLETE CBC W/AUTO DIFF WBC: CPT

## 2024-09-24 PROCEDURE — 36415 COLL VENOUS BLD VENIPUNCTURE: CPT

## 2024-09-24 PROCEDURE — 87641 MR-STAPH DNA AMP PROBE: CPT

## 2024-09-24 PROCEDURE — 87635 SARS-COV-2 COVID-19 AMP PRB: CPT

## 2024-09-24 NOTE — H&P
Patient Care Team:  Prabhu Downing MD as PCP - General  Prabhu Downing MD as PCP - Family Medicine  Ham Jacinto MD as Consulting Physician (Cardiology)  Referring Provider:  Dr. Jacinto  Reason for consultation:  Progressive CAD, s/p CABG     Chief complaint:  chest pain        Subjective  History of Present Illness:  59 y/o gentleman presented today for an elective heart cath after an abnormal stress test showing a large-sized, severe area of ischemia located in the inferior wall and lateral wall with an EF 43%.  He reports he was doing well until he started developing chest pain/tightness and fatigue while mowing the grass in the summer heat.  He has a hx of CABG, working on getting op note.  He also has HTN, HLD, and DM type 2.  He has never smoked.  Cardiac cath completed today and pt referred for surgical evaluation for reop CABG.        Review of Systems   Constitutional:  Positive for fatigue.   Respiratory:  Positive for chest tightness. Negative for shortness of breath.    Cardiovascular:  Positive for chest pain. Negative for palpitations and leg swelling.   Neurological:  Negative for dizziness and light-headedness.         Medical History        Past Medical History:   Diagnosis Date    CAD (coronary artery disease)      Hyperlipidemia      Hypertension      Type 2 diabetes mellitus           Surgical History         Past Surgical History:   Procedure Laterality Date    APPENDECTOMY        CHOLECYSTECTOMY        CORONARY ARTERY BYPASS GRAFT         2010               Family History   Problem Relation Age of Onset    Heart attack Mother      Alcohol abuse Father        Social History   Social History           Tobacco Use    Smoking status: Never    Smokeless tobacco: Never   Vaping Use    Vaping status: Never Used   Substance Use Topics    Alcohol use: No    Drug use: Defer         Prescriptions Prior to Admission           Medications Prior to Admission   Medication Sig Dispense Refill Last Dose     "Apple Cider Vinegar 188 MG capsule Take 1 capsule by mouth Daily.     8/29/2024    aspirin 81 MG tablet Take 1 tablet by mouth Daily.     8/29/2024 at 0800    calcium carbonate, oyster shell, 500 MG tablet tablet Take 1 tablet by mouth Daily.     8/29/2024    cholestyramine light (PREVALITE) 4 GM/DOSE powder Take 1 packet by mouth 2 (Two) Times a Day With Meals. 180 g 6 8/29/2024 at 1700    empagliflozin (Jardiance) 25 MG tablet tablet Take 1 tablet by mouth once daily 90 tablet 3 8/29/2024 at 0800    fenofibrate 160 MG tablet Take 1 tablet by mouth Daily.     8/29/2024 at 0800    Insulin Regular Human, Conc, (HumuLIN R U-500 KwikPen) 500 UNIT/ML solution pen-injector CONCENTRATED injection Inject 130 Units under the skin into the appropriate area as directed 2 (Two) Times a Day. With Meals     8/29/2024 at 1200    Insulin Regular Human, Conc, (HumuLIN R U-500 KwikPen) 500 UNIT/ML solution pen-injector CONCENTRATED injection Inject 120 Units under the skin into the appropriate area as directed Daily With Dinner.     8/29/2024    metFORMIN (GLUCOPHAGE) 1000 MG tablet TAKE ONE TABLET BY MOUTH TWICE A DAY WITH MEALS 180 tablet 4      metFORMIN (GLUCOPHAGE) 1000 MG tablet Take 1 tablet by mouth 2 (Two) Times a Day With Meals.     8/29/2024 at 1700    metoprolol tartrate (LOPRESSOR) 50 MG tablet Take 1.5 tablets by mouth 2 (Two) Times a Day.     8/30/2024    Omega-3 Fatty Acids (OMEGA-3 2100 PO) Take 1 capsule by mouth Daily.     Past Week    RED YEAST RICE EXTRACT PO Take 1 capsule by mouth Daily.          rosuvastatin (CRESTOR) 40 MG tablet Take 1 tablet by mouth once daily 90 tablet 3           Scheduled Medication         Allergies:  Levofloxacin           Objective  Vital Signs  Temp:  [98.1 °F (36.7 °C)] 98.1 °F (36.7 °C)  Heart Rate:  [53-71] 71  Resp:  [18] 18  BP: (114-131)/(73-82) 131/82     Flowsheet Rows       Flowsheet Row First Filed Value   Admission Height 182.9 cm (72\") Documented at 08/30/2024 1055 " "  Admission Weight 84.2 kg (185 lb 10 oz) Documented at 08/30/2024 1055             182.9 cm (72\")     Physical Exam  Vitals and nursing note reviewed.   Constitutional:       General: He is awake.      Appearance: Normal appearance. He is well-developed and well-groomed.      Comments: Seen in [facility].  No family present.  Nurse present.   HENT:      Head: Normocephalic and atraumatic.      Nose: Nose normal.      Mouth/Throat:      Lips: Pink.      Mouth: Mucous membranes are moist.      Pharynx: Uvula midline.   Eyes:      General: Lids are normal. No scleral icterus.     Extraocular Movements: Extraocular movements intact.      Conjunctiva/sclera: Conjunctivae normal.      Pupils: Pupils are equal, round, and reactive to light.   Neck:      Thyroid: No thyroid mass or thyromegaly.      Vascular: Normal carotid pulses. No carotid bruit, hepatojugular reflux or JVD.      Trachea: Trachea normal.   Cardiovascular:      Rate and Rhythm: Normal rate and regular rhythm.      Pulses:           Carotid pulses are 2+ on the right side and 2+ on the left side.       Radial pulses are 2+ on the right side and 2+ on the left side.        Femoral pulses are 2+ on the right side and 2+ on the left side.       Popliteal pulses are 2+ on the right side and 2+ on the left side.        Dorsalis pedis pulses are 2+ on the right side and 2+ on the left side.        Posterior tibial pulses are 2+ on the right side and 2+ on the left side.      Heart sounds: Normal heart sounds. No murmur heard.     Comments: Right femoral cath sheath  Tele:  SR 60s  Pulmonary:      Effort: Pulmonary effort is normal.      Breath sounds: Normal breath sounds.   Abdominal:      General: Abdomen is protuberant. Bowel sounds are normal. There is no distension.      Palpations: Abdomen is soft.      Tenderness: There is no abdominal tenderness.   Musculoskeletal:      Cervical back: Neck supple.      Right lower leg: No edema.      Left lower leg: No " edema.      Comments: Gait steady and strong without use of assistive devices   Lymphadenopathy:      Cervical: No cervical adenopathy.      Upper Body:      Right upper body: No supraclavicular adenopathy.      Left upper body: No supraclavicular adenopathy.   Skin:     General: Skin is warm and dry.      Capillary Refill: Capillary refill takes less than 2 seconds.      Findings: No erythema or rash.      Nails: There is no clubbing.      Comments: Sternotomy/ SVHS well healed.  Skin has a eli appearance.   Neurological:      Mental Status: He is alert and oriented to person, place, and time.      GCS: GCS eye subscore is 4. GCS verbal subscore is 5. GCS motor subscore is 6.   Psychiatric:         Attention and Perception: Attention and perception normal.         Mood and Affect: Mood and affect normal.         Speech: Speech normal.         Behavior: Behavior normal. Behavior is cooperative.         Thought Content: Thought content normal.         Cognition and Memory: Cognition and memory normal.         Judgment: Judgment normal.            Results Review:   Lab Results (last 24 hours)         ** No results found for the last 24 hours. **                Cardiac cath per Dr. Jacinto 8/30/2024  Report pending              Assessment & Plan    Angina at rest    Abnormal nuclear stress test        Assessment & Plan     - MV CAD, hx off pump CABG x3 with LIMA to LAD, SVG to distal RCA, SVG to 1st diagonal (Vishnu, 6/2011), EF 43% (stress test)--evaluation for reop CABG  - HTN--stable  - HLD--statin/ fenofibrate, cholestyramine  - DM type 2--followed by Dr. Mendenhall, on Jardiance/ metformin/ U-500 insulin  - Hypertriglyceridemia with hx pancreatitis--last trigly 1056 (3/2024)     Dr. Mares evaluated cath films and patient.  Patient is followed by Dr. Mendenhall for his DM type 2 and lipids.  He is on U-500 insulin 120 units 3 times a day currently.  We are going to see if Dr. Mendenhall can see him preop to optimize his  sugars/lipids.  Dr. Mares recommended reop CABG if preop studies are adequate.  Carotid duplex, vein mapping, echo, and CT chest without ordered.  Ideally all would be completed before discharge today.  Wife was present for discussion regarding reop CABG.  Pt/wife are in agreement for workup for reop CABG.       Thank you for allowing us to participate in the care of this patient.       JYOTHI Adames  24  12:57 EDT     **all problems new to this examiner  **EKG and CXR independently reviewed and interpreted     ADDENDUM:     Preop studies:  Carotid duplex:  Vein mapping:  adequate  Platelet ag%  A1c:  8.7  MRSA screen:  negative     Pt seen in [facility] on  for upcoming reop CABG.  Preop studies reviewed.  Questions answered to his satisfaction.  No changes in his health hx.  He has been off his Jardiance since .  He is followed by Dr. Mendenhall and uses U-500 insulin (130 u before breakfast/ 120 u before dinner).  I have discussed this with our [facility] pharmacist and we may need to add some Lantus while he is on insulin drip.  Dr. Mendenhall will be consulted postop as well.     Electronically signed by JYOTHI Adames, 24, 3:36 PM EDT.

## 2024-09-25 ENCOUNTER — ANESTHESIA EVENT (OUTPATIENT)
Dept: PERIOP | Facility: HOSPITAL | Age: 59
End: 2024-09-25
Payer: COMMERCIAL

## 2024-09-26 ENCOUNTER — ANESTHESIA (OUTPATIENT)
Dept: PERIOP | Facility: HOSPITAL | Age: 59
End: 2024-09-26
Payer: COMMERCIAL

## 2024-09-26 ENCOUNTER — HOSPITAL ENCOUNTER (INPATIENT)
Facility: HOSPITAL | Age: 59
LOS: 6 days | Discharge: HOME | End: 2024-10-02
Attending: THORACIC SURGERY (CARDIOTHORACIC VASCULAR SURGERY) | Admitting: THORACIC SURGERY (CARDIOTHORACIC VASCULAR SURGERY)
Payer: COMMERCIAL

## 2024-09-26 ENCOUNTER — OFFICE VISIT (OUTPATIENT)
Dept: PERIOP | Facility: HOSPITAL | Age: 59
End: 2024-09-26
Payer: COMMERCIAL

## 2024-09-26 ENCOUNTER — APPOINTMENT (OUTPATIENT)
Dept: GENERAL RADIOLOGY | Facility: HOSPITAL | Age: 59
End: 2024-09-26
Payer: COMMERCIAL

## 2024-09-26 DIAGNOSIS — Z95.1 S/P CABG X 3: Primary | ICD-10-CM

## 2024-09-26 DIAGNOSIS — I25.708 CORONARY ARTERY DISEASE OF BYPASS GRAFT OF NATIVE HEART WITH STABLE ANGINA PECTORIS: ICD-10-CM

## 2024-09-26 DIAGNOSIS — R09.02 HYPOXIA: ICD-10-CM

## 2024-09-26 LAB
ACT BLD: 103 SECONDS (ref 89–137)
ACT BLD: 110 SECONDS (ref 89–137)
ACT BLD: 311 SECONDS (ref 89–137)
ACT BLD: 342 SECONDS (ref 89–137)
ACT BLD: 360 SECONDS (ref 89–137)
ACT BLD: 397 SECONDS (ref 89–137)
ALBUMIN SERPL-MCNC: 4.4 G/DL (ref 3.5–5.2)
ANION GAP SERPL CALCULATED.3IONS-SCNC: 9.3 MMOL/L (ref 5–15)
APTT PPP: 21.6 SECONDS (ref 24–31)
APTT PPP: <20 SECONDS (ref 24–31)
ARTERIAL PATENCY WRIST A: ABNORMAL
ATMOSPHERIC PRESS: ABNORMAL MM[HG]
BASE DEFICIT: ABNORMAL
BASE EXCESS BLDA CALC-SCNC: -0.4 MMOL/L (ref 0–3)
BASE EXCESS BLDA CALC-SCNC: -0.7 MMOL/L (ref 0–3)
BASE EXCESS BLDA CALC-SCNC: -0.9 MMOL/L (ref 0–3)
BASE EXCESS BLDA CALC-SCNC: -1.4 MMOL/L (ref 0–3)
BASE EXCESS BLDA CALC-SCNC: -3 MMOL/L (ref 0–3)
BASE EXCESS BLDA CALC-SCNC: 0 MMOL/L (ref 0–3)
BASE EXCESS BLDA CALC-SCNC: 0.6 MMOL/L (ref 0–3)
BASE EXCESS BLDA CALC-SCNC: 1 MMOL/L (ref 0–3)
BASE EXCESS BLDA CALC-SCNC: 2 MMOL/L (ref 0–3)
BASE EXCESS BLDA CALC-SCNC: <0 MMOL/L (ref 0–3)
BASE EXCESS BLDA CALC-SCNC: <0 MMOL/L (ref 0–3)
BASE EXCESS BLDV CALC-SCNC: 0 MMOL/L (ref 0–3)
BASOPHILS # BLD AUTO: 0.01 10*3/MM3 (ref 0–0.2)
BASOPHILS NFR BLD AUTO: 0.2 % (ref 0–1.5)
BDY SITE: ABNORMAL
BH BB BLOOD EXPIRATION DATE: NORMAL
BH BB BLOOD EXPIRATION DATE: NORMAL
BH BB BLOOD TYPE BARCODE: 6200
BH BB BLOOD TYPE BARCODE: 6200
BH BB DISPENSE STATUS: NORMAL
BH BB DISPENSE STATUS: NORMAL
BH BB PRODUCT CODE: NORMAL
BH BB PRODUCT CODE: NORMAL
BH BB UNIT NUMBER: NORMAL
BH BB UNIT NUMBER: NORMAL
BUN SERPL-MCNC: 15 MG/DL (ref 6–20)
BUN/CREAT SERPL: 15.3 (ref 7–25)
CA-I BLDA-SCNC: 1.01 MMOL/L (ref 1.12–1.32)
CA-I BLDA-SCNC: 1.03 MMOL/L (ref 1.12–1.32)
CA-I BLDA-SCNC: 1.03 MMOL/L (ref 1.12–1.32)
CA-I BLDA-SCNC: 1.08 MMOL/L (ref 1.12–1.32)
CA-I BLDA-SCNC: 1.1 MMOL/L (ref 1.15–1.33)
CA-I BLDA-SCNC: 1.12 MMOL/L (ref 1.15–1.33)
CA-I BLDA-SCNC: 1.16 MMOL/L (ref 1.15–1.33)
CA-I BLDA-SCNC: 1.17 MMOL/L (ref 1.12–1.32)
CA-I BLDA-SCNC: 1.21 MMOL/L (ref 1.15–1.33)
CA-I BLDA-SCNC: 1.25 MMOL/L (ref 1.15–1.33)
CA-I BLDA-SCNC: 1.26 MMOL/L (ref 1.12–1.32)
CA-I SERPL ISE-MCNC: 1.11 MMOL/L (ref 1.15–1.3)
CALCIUM SPEC-SCNC: 8.9 MG/DL (ref 8.6–10.5)
CHLORIDE SERPL-SCNC: 105 MMOL/L (ref 98–107)
CLOSE TME COLL+ADP + EPINEP PNL BLD: 95 % (ref 86–100)
CO2 BLDA-SCNC: 19.5 MMOL/L (ref 22–29)
CO2 BLDA-SCNC: 23.8 MMOL/L (ref 22–29)
CO2 BLDA-SCNC: 24.4 MMOL/L (ref 22–29)
CO2 BLDA-SCNC: 25.1 MMOL/L (ref 22–29)
CO2 BLDA-SCNC: 25.4 MMOL/L (ref 22–29)
CO2 BLDA-SCNC: 27 MMOL/L (ref 23–27)
CO2 BLDA-SCNC: 27 MMOL/L (ref 23–27)
CO2 BLDA-SCNC: 28 MMOL/L (ref 23–27)
CO2 BLDA-SCNC: 28 MMOL/L (ref 23–27)
CO2 BLDA-SCNC: 30 MMOL/L (ref 23–27)
CO2 CONTENT VENOUS: 29 MMOL/L (ref 24–29)
CO2 SERPL-SCNC: 24.7 MMOL/L (ref 22–29)
CREAT BLDA-MCNC: 0.76 MG/DL (ref 0.6–1.3)
CREAT SERPL-MCNC: 0.98 MG/DL (ref 0.76–1.27)
CROSSMATCH INTERPRETATION: NORMAL
CROSSMATCH INTERPRETATION: NORMAL
DEPRECATED RDW RBC AUTO: 45.3 FL (ref 37–54)
DEPRECATED RDW RBC AUTO: 45.9 FL (ref 37–54)
DEPRECATED RDW RBC AUTO: 46.1 FL (ref 37–54)
EGFRCR SERPLBLD CKD-EPI 2021: 103.5 ML/MIN/1.73
EGFRCR SERPLBLD CKD-EPI 2021: 88.8 ML/MIN/1.73
EOSINOPHIL # BLD AUTO: 0.05 10*3/MM3 (ref 0–0.4)
EOSINOPHIL NFR BLD AUTO: 0.9 % (ref 0.3–6.2)
ERYTHROCYTE [DISTWIDTH] IN BLOOD BY AUTOMATED COUNT: 14.6 % (ref 12.3–15.4)
ERYTHROCYTE [DISTWIDTH] IN BLOOD BY AUTOMATED COUNT: 14.7 % (ref 12.3–15.4)
ERYTHROCYTE [DISTWIDTH] IN BLOOD BY AUTOMATED COUNT: 14.7 % (ref 12.3–15.4)
FIBRINOGEN PPP-MCNC: 124 MG/DL (ref 210–450)
GLUCOSE BLDC GLUCOMTR-MCNC: 121 MG/DL (ref 74–100)
GLUCOSE BLDC GLUCOMTR-MCNC: 121 MG/DL (ref 74–100)
GLUCOSE BLDC GLUCOMTR-MCNC: 132 MG/DL (ref 74–100)
GLUCOSE BLDC GLUCOMTR-MCNC: 132 MG/DL (ref 74–100)
GLUCOSE BLDC GLUCOMTR-MCNC: 146 MG/DL (ref 70–105)
GLUCOSE BLDC GLUCOMTR-MCNC: 157 MG/DL (ref 70–105)
GLUCOSE BLDC GLUCOMTR-MCNC: 158 MG/DL (ref 70–105)
GLUCOSE BLDC GLUCOMTR-MCNC: 166 MG/DL (ref 70–105)
GLUCOSE BLDC GLUCOMTR-MCNC: 169 MG/DL (ref 70–105)
GLUCOSE BLDC GLUCOMTR-MCNC: 177 MG/DL (ref 70–105)
GLUCOSE BLDC GLUCOMTR-MCNC: 183 MG/DL (ref 74–100)
GLUCOSE BLDC GLUCOMTR-MCNC: 183 MG/DL (ref 74–100)
GLUCOSE BLDC GLUCOMTR-MCNC: 185 MG/DL (ref 70–105)
GLUCOSE BLDC GLUCOMTR-MCNC: 186 MG/DL (ref 70–105)
GLUCOSE BLDC GLUCOMTR-MCNC: 188 MG/DL (ref 74–100)
GLUCOSE BLDC GLUCOMTR-MCNC: 188 MG/DL (ref 74–100)
GLUCOSE BLDC GLUCOMTR-MCNC: 192 MG/DL (ref 70–105)
GLUCOSE BLDC GLUCOMTR-MCNC: 193 MG/DL (ref 70–105)
GLUCOSE BLDC GLUCOMTR-MCNC: 210 MG/DL (ref 74–100)
GLUCOSE BLDC GLUCOMTR-MCNC: 210 MG/DL (ref 74–100)
GLUCOSE BLDC GLUCOMTR-MCNC: 266 MG/DL (ref 70–105)
GLUCOSE BLDC GLUCOMTR-MCNC: 282 MG/DL (ref 70–105)
GLUCOSE SERPL-MCNC: 133 MG/DL (ref 65–99)
HCO3 BLDA-SCNC: 18.8 MMOL/L (ref 21–28)
HCO3 BLDA-SCNC: 22.7 MMOL/L (ref 21–28)
HCO3 BLDA-SCNC: 23.3 MMOL/L (ref 21–28)
HCO3 BLDA-SCNC: 24.1 MMOL/L (ref 21–28)
HCO3 BLDA-SCNC: 24.1 MMOL/L (ref 21–28)
HCO3 BLDA-SCNC: 25.1 MMOL/L (ref 22–26)
HCO3 BLDA-SCNC: 25.2 MMOL/L (ref 21–28)
HCO3 BLDA-SCNC: 25.5 MMOL/L (ref 22–26)
HCO3 BLDA-SCNC: 26.6 MMOL/L (ref 22–26)
HCO3 BLDA-SCNC: 26.6 MMOL/L (ref 22–26)
HCO3 BLDA-SCNC: 28 MMOL/L (ref 22–26)
HCO3 BLDV-SCNC: 27.1 MMOL/L (ref 23–28)
HCT VFR BLD AUTO: 26 % (ref 37.5–51)
HCT VFR BLD AUTO: 29.1 % (ref 37.5–51)
HCT VFR BLD AUTO: 34.4 % (ref 37.5–51)
HCT VFR BLDA CALC: 30 % (ref 38–51)
HCT VFR BLDA CALC: 32 % (ref 38–51)
HCT VFR BLDA CALC: 33 % (ref 38–51)
HCT VFR BLDA CALC: 34 % (ref 38–51)
HCT VFR BLDA CALC: 34 % (ref 38–51)
HCT VFR BLDA CALC: 35 % (ref 38–51)
HCT VFR BLDA CALC: 35 % (ref 38–51)
HCT VFR BLDA CALC: 41 % (ref 38–51)
HEMODILUTION: NO
HEMODILUTION: NO
HEMODILUTION: YES
HGB BLD-MCNC: 11.2 G/DL (ref 13–17.7)
HGB BLD-MCNC: 8.5 G/DL (ref 13–17.7)
HGB BLD-MCNC: 9.6 G/DL (ref 13–17.7)
HGB BLDA-MCNC: 10.2 G/DL (ref 12–17)
HGB BLDA-MCNC: 10.9 G/DL (ref 12–17)
HGB BLDA-MCNC: 11.2 G/DL (ref 12–17)
HGB BLDA-MCNC: 11.4 G/DL (ref 12–17)
HGB BLDA-MCNC: 11.6 G/DL (ref 12–17)
HGB BLDA-MCNC: 11.6 G/DL (ref 12–17)
HGB BLDA-MCNC: 11.7 G/DL (ref 12–17)
HGB BLDA-MCNC: 11.8 G/DL (ref 12–17)
HGB BLDA-MCNC: 13.9 G/DL (ref 12–17)
IMM GRANULOCYTES # BLD AUTO: 0.01 10*3/MM3 (ref 0–0.05)
IMM GRANULOCYTES NFR BLD AUTO: 0.2 % (ref 0–0.5)
INHALED O2 CONCENTRATION: 100 %
INHALED O2 CONCENTRATION: 36 %
INHALED O2 CONCENTRATION: 40 %
INHALED O2 CONCENTRATION: 60 %
INR PPP: 1.24 (ref 0.93–1.1)
INR PPP: 1.27 (ref 0.93–1.1)
LYMPHOCYTES # BLD AUTO: 1.05 10*3/MM3 (ref 0.7–3.1)
LYMPHOCYTES NFR BLD AUTO: 18 % (ref 19.6–45.3)
MCH RBC QN AUTO: 27.7 PG (ref 26.6–33)
MCH RBC QN AUTO: 28.1 PG (ref 26.6–33)
MCH RBC QN AUTO: 28.2 PG (ref 26.6–33)
MCHC RBC AUTO-ENTMCNC: 32.6 G/DL (ref 31.5–35.7)
MCHC RBC AUTO-ENTMCNC: 32.7 G/DL (ref 31.5–35.7)
MCHC RBC AUTO-ENTMCNC: 33 G/DL (ref 31.5–35.7)
MCV RBC AUTO: 84.9 FL (ref 79–97)
MCV RBC AUTO: 85.6 FL (ref 79–97)
MCV RBC AUTO: 86.1 FL (ref 79–97)
MODALITY: ABNORMAL
MONOCYTES # BLD AUTO: 0.48 10*3/MM3 (ref 0.1–0.9)
MONOCYTES NFR BLD AUTO: 8.2 % (ref 5–12)
NEUTROPHILS NFR BLD AUTO: 4.22 10*3/MM3 (ref 1.7–7)
NEUTROPHILS NFR BLD AUTO: 72.5 % (ref 42.7–76)
NRBC BLD AUTO-RTO: 0 /100 WBC (ref 0–0.2)
PCO2 BLDA: 23.9 MM HG (ref 35–48)
PCO2 BLDA: 33.3 MM HG (ref 35–48)
PCO2 BLDA: 33.8 MM HG (ref 35–48)
PCO2 BLDA: 35.5 MM HG (ref 35–48)
PCO2 BLDA: 43 MM HG (ref 35–48)
PCO2 BLDA: 44.1 MM HG (ref 35–48)
PCO2 BLDA: 45.6 MM HG (ref 35–45)
PCO2 BLDA: 49.6 MM HG (ref 35–45)
PCO2 BLDA: 51.8 MM HG (ref 35–45)
PCO2 BLDA: 52.5 MM HG (ref 35–45)
PCO2 BLDA: 59.9 MM HG (ref 35–45)
PCO2 BLDV: 56.7 MM HG (ref 41–51)
PEEP RESPIRATORY: 5 CM[H2O]
PEEP RESPIRATORY: 5 CM[H2O]
PEEP RESPIRATORY: 8 CM[H2O]
PH BLDA: 7.24 PH UNITS (ref 7.35–7.45)
PH BLDA: 7.31 PH UNITS (ref 7.35–7.45)
PH BLDA: 7.31 PH UNITS (ref 7.35–7.45)
PH BLDA: 7.34 PH UNITS (ref 7.35–7.45)
PH BLDA: 7.36 PH UNITS (ref 7.35–7.45)
PH BLDA: 7.36 PH UNITS (ref 7.35–7.45)
PH BLDA: 7.37 PH UNITS (ref 7.35–7.45)
PH BLDA: 7.42 PH UNITS (ref 7.35–7.45)
PH BLDA: 7.44 PH UNITS (ref 7.35–7.45)
PH BLDA: 7.46 PH UNITS (ref 7.35–7.45)
PH BLDA: 7.5 PH UNITS (ref 7.35–7.45)
PH BLDV: 7.29 PH UNITS (ref 7.31–7.41)
PHOSPHATE SERPL-MCNC: 0.9 MG/DL (ref 2.5–4.5)
PHOSPHATE SERPL-MCNC: 4.4 MG/DL (ref 2.5–4.5)
PLATELET # BLD AUTO: 100 10*3/MM3 (ref 140–450)
PLATELET # BLD AUTO: 106 10*3/MM3 (ref 140–450)
PLATELET # BLD AUTO: 122 10*3/MM3 (ref 140–450)
PMV BLD AUTO: 10.2 FL (ref 6–12)
PMV BLD AUTO: 10.2 FL (ref 6–12)
PMV BLD AUTO: 9.9 FL (ref 6–12)
PO2 BLD: 243 MM[HG] (ref 0–500)
PO2 BLD: 304 MM[HG] (ref 0–500)
PO2 BLD: 314 MM[HG] (ref 0–500)
PO2 BLD: 320 MM[HG] (ref 0–500)
PO2 BLD: 322 MM[HG] (ref 0–500)
PO2 BLD: 338 MM[HG] (ref 0–500)
PO2 BLDA: 109.6 MM HG (ref 83–108)
PO2 BLDA: 127 MM HG (ref 80–105)
PO2 BLDA: 127.9 MM HG (ref 83–108)
PO2 BLDA: 128.8 MM HG (ref 83–108)
PO2 BLDA: 135.1 MM HG (ref 83–108)
PO2 BLDA: 188.6 MM HG (ref 83–108)
PO2 BLDA: 243 MM HG (ref 83–108)
PO2 BLDA: 326 MM HG (ref 80–105)
PO2 BLDA: 326 MM HG (ref 80–105)
PO2 BLDA: 399 MM HG (ref 80–105)
PO2 BLDA: 417 MM HG (ref 80–105)
PO2 BLDV: 39 MM HG (ref 35–42)
POTASSIUM BLDA-SCNC: 3.5 MMOL/L (ref 3.5–4.5)
POTASSIUM BLDA-SCNC: 3.8 MMOL/L (ref 3.5–4.5)
POTASSIUM BLDA-SCNC: 3.8 MMOL/L (ref 3.5–4.5)
POTASSIUM BLDA-SCNC: 3.9 MMOL/L (ref 3.5–4.5)
POTASSIUM BLDA-SCNC: 3.9 MMOL/L (ref 3.5–4.9)
POTASSIUM BLDA-SCNC: 4 MMOL/L (ref 3.5–4.5)
POTASSIUM BLDA-SCNC: 4.1 MMOL/L (ref 3.5–4.9)
POTASSIUM BLDA-SCNC: 4.2 MMOL/L (ref 3.5–4.9)
POTASSIUM BLDA-SCNC: 4.2 MMOL/L (ref 3.5–4.9)
POTASSIUM BLDA-SCNC: 4.3 MMOL/L (ref 3.5–4.9)
POTASSIUM BLDA-SCNC: 4.4 MMOL/L (ref 3.5–4.9)
POTASSIUM SERPL-SCNC: 3.6 MMOL/L (ref 3.5–5.2)
POTASSIUM SERPL-SCNC: 4.1 MMOL/L (ref 3.5–5.2)
PROTHROMBIN TIME: 13.3 SECONDS (ref 9.6–11.7)
PROTHROMBIN TIME: 13.6 SECONDS (ref 9.6–11.7)
PSV: 10 CMH2O
RBC # BLD AUTO: 3.02 10*6/MM3 (ref 4.14–5.8)
RBC # BLD AUTO: 3.4 10*6/MM3 (ref 4.14–5.8)
RBC # BLD AUTO: 4.05 10*6/MM3 (ref 4.14–5.8)
RESPIRATORY RATE: 12
RESPIRATORY RATE: 14
SAO2 % BLDCOA: 100 % (ref 95–98)
SAO2 % BLDCOA: 98 % (ref 94–98)
SAO2 % BLDCOA: 98.8 % (ref 94–98)
SAO2 % BLDCOA: 99 % (ref 94–98)
SAO2 % BLDCOA: 99 % (ref 95–98)
SAO2 % BLDCOA: 99.2 % (ref 94–98)
SAO2 % BLDCOA: 99.7 % (ref 94–98)
SAO2 % BLDCOA: 99.9 % (ref 94–98)
SAO2 % BLDCOV: ABNORMAL %
SODIUM BLD-SCNC: 135 MMOL/L (ref 138–146)
SODIUM BLD-SCNC: 136 MMOL/L (ref 138–146)
SODIUM BLD-SCNC: 137 MMOL/L (ref 138–146)
SODIUM BLD-SCNC: 139 MMOL/L (ref 138–146)
SODIUM BLD-SCNC: 139 MMOL/L (ref 138–146)
SODIUM BLD-SCNC: 140 MMOL/L (ref 138–146)
SODIUM BLD-SCNC: 140 MMOL/L (ref 138–146)
SODIUM BLD-SCNC: 142 MMOL/L (ref 138–146)
SODIUM BLD-SCNC: 142 MMOL/L (ref 138–146)
SODIUM BLD-SCNC: 143 MMOL/L (ref 138–146)
SODIUM BLD-SCNC: 144 MMOL/L (ref 138–146)
SODIUM SERPL-SCNC: 139 MMOL/L (ref 136–145)
UNIT  ABO: NORMAL
UNIT  ABO: NORMAL
UNIT  RH: NORMAL
UNIT  RH: NORMAL
VENTILATOR MODE: ABNORMAL
VENTILATOR MODE: AC
VT ON VENT VENT: 750 ML
VT ON VENT VENT: 800 ML
WBC NRBC COR # BLD AUTO: 5.82 10*3/MM3 (ref 3.4–10.8)
WBC NRBC COR # BLD AUTO: 5.92 10*3/MM3 (ref 3.4–10.8)
WBC NRBC COR # BLD AUTO: 6.99 10*3/MM3 (ref 3.4–10.8)

## 2024-09-26 PROCEDURE — 25010000002 MIDAZOLAM PER 1 MG: Performed by: ANESTHESIOLOGY

## 2024-09-26 PROCEDURE — 33530 CORONARY ARTERY BYPASS/REOP: CPT | Performed by: THORACIC SURGERY (CARDIOTHORACIC VASCULAR SURGERY)

## 2024-09-26 PROCEDURE — 99233 SBSQ HOSP IP/OBS HIGH 50: CPT | Performed by: INTERNAL MEDICINE

## 2024-09-26 PROCEDURE — A4648 IMPLANTABLE TISSUE MARKER: HCPCS | Performed by: THORACIC SURGERY (CARDIOTHORACIC VASCULAR SURGERY)

## 2024-09-26 PROCEDURE — 93005 ELECTROCARDIOGRAM TRACING: CPT | Performed by: NURSE PRACTITIONER

## 2024-09-26 PROCEDURE — 25010000002 MORPHINE PER 10 MG: Performed by: NURSE PRACTITIONER

## 2024-09-26 PROCEDURE — P9035 PLATELET PHERES LEUKOREDUCED: HCPCS

## 2024-09-26 PROCEDURE — 25010000002 PAPAVERINE PER 60 MG: Performed by: THORACIC SURGERY (CARDIOTHORACIC VASCULAR SURGERY)

## 2024-09-26 PROCEDURE — 25010000002 ACETAMINOPHEN 10 MG/ML SOLUTION: Performed by: NURSE PRACTITIONER

## 2024-09-26 PROCEDURE — P9100 PATHOGEN TEST FOR PLATELETS: HCPCS

## 2024-09-26 PROCEDURE — 85610 PROTHROMBIN TIME: CPT | Performed by: THORACIC SURGERY (CARDIOTHORACIC VASCULAR SURGERY)

## 2024-09-26 PROCEDURE — 25010000002 PROTAMINE SULFATE PER 10 MG: Performed by: ANESTHESIOLOGY

## 2024-09-26 PROCEDURE — 25010000002 PROPOFOL 200 MG/20ML EMULSION: Performed by: ANESTHESIOLOGY

## 2024-09-26 PROCEDURE — 25010000002 HEPARIN (PORCINE) PER 1000 UNITS: Performed by: ANESTHESIOLOGY

## 2024-09-26 PROCEDURE — 25010000002 ACETAMINOPHEN 10 MG/ML SOLUTION: Performed by: ANESTHESIOLOGY

## 2024-09-26 PROCEDURE — 94799 UNLISTED PULMONARY SVC/PX: CPT

## 2024-09-26 PROCEDURE — 33512 CABG VEIN THREE: CPT | Performed by: THORACIC SURGERY (CARDIOTHORACIC VASCULAR SURGERY)

## 2024-09-26 PROCEDURE — 84100 ASSAY OF PHOSPHORUS: CPT | Performed by: THORACIC SURGERY (CARDIOTHORACIC VASCULAR SURGERY)

## 2024-09-26 PROCEDURE — B24BZZ4 ULTRASONOGRAPHY OF HEART WITH AORTA, TRANSESOPHAGEAL: ICD-10-PCS | Performed by: THORACIC SURGERY (CARDIOTHORACIC VASCULAR SURGERY)

## 2024-09-26 PROCEDURE — 71045 X-RAY EXAM CHEST 1 VIEW: CPT

## 2024-09-26 PROCEDURE — 36430 TRANSFUSION BLD/BLD COMPNT: CPT

## 2024-09-26 PROCEDURE — C1729 CATH, DRAINAGE: HCPCS | Performed by: THORACIC SURGERY (CARDIOTHORACIC VASCULAR SURGERY)

## 2024-09-26 PROCEDURE — 25010000002 NITROGLYCERIN 200 MCG/ML SOLUTION: Performed by: ANESTHESIOLOGY

## 2024-09-26 PROCEDURE — 25010000002 MAGNESIUM SULFATE PER 500 MG OF MAGNESIUM: Performed by: ANESTHESIOLOGY

## 2024-09-26 PROCEDURE — 25010000002 ALBUMIN HUMAN 5% PER 50 ML: Performed by: NURSE PRACTITIONER

## 2024-09-26 PROCEDURE — 94002 VENT MGMT INPAT INIT DAY: CPT

## 2024-09-26 PROCEDURE — C1751 CATH, INF, PER/CENT/MIDLINE: HCPCS | Performed by: ANESTHESIOLOGY

## 2024-09-26 PROCEDURE — 85730 THROMBOPLASTIN TIME PARTIAL: CPT | Performed by: NURSE PRACTITIONER

## 2024-09-26 PROCEDURE — 85384 FIBRINOGEN ACTIVITY: CPT | Performed by: THORACIC SURGERY (CARDIOTHORACIC VASCULAR SURGERY)

## 2024-09-26 PROCEDURE — 85018 HEMOGLOBIN: CPT

## 2024-09-26 PROCEDURE — 80051 ELECTROLYTE PANEL: CPT

## 2024-09-26 PROCEDURE — 25010000002 ONDANSETRON PER 1 MG: Performed by: NURSE PRACTITIONER

## 2024-09-26 PROCEDURE — C1887 CATHETER, GUIDING: HCPCS | Performed by: THORACIC SURGERY (CARDIOTHORACIC VASCULAR SURGERY)

## 2024-09-26 PROCEDURE — C1751 CATH, INF, PER/CENT/MIDLINE: HCPCS | Performed by: THORACIC SURGERY (CARDIOTHORACIC VASCULAR SURGERY)

## 2024-09-26 PROCEDURE — 25010000002 CEFAZOLIN PER 500 MG: Performed by: NURSE PRACTITIONER

## 2024-09-26 PROCEDURE — 85027 COMPLETE CBC AUTOMATED: CPT | Performed by: THORACIC SURGERY (CARDIOTHORACIC VASCULAR SURGERY)

## 2024-09-26 PROCEDURE — 25010000002 NICARDIPINE 2.5 MG/ML SOLUTION: Performed by: ANESTHESIOLOGY

## 2024-09-26 PROCEDURE — 82330 ASSAY OF CALCIUM: CPT | Performed by: NURSE PRACTITIONER

## 2024-09-26 PROCEDURE — 25010000002 FENTANYL CITRATE (PF) 250 MCG/5ML SOLUTION: Performed by: ANESTHESIOLOGY

## 2024-09-26 PROCEDURE — 25010000002 NICARDIPINE 2.5 MG/ML SOLUTION 10 ML VIAL: Performed by: NURSE PRACTITIONER

## 2024-09-26 PROCEDURE — 82948 REAGENT STRIP/BLOOD GLUCOSE: CPT

## 2024-09-26 PROCEDURE — P9041 ALBUMIN (HUMAN),5%, 50ML: HCPCS | Performed by: ANESTHESIOLOGY

## 2024-09-26 PROCEDURE — 25010000002 CEFAZOLIN PER 500 MG: Performed by: ANESTHESIOLOGY

## 2024-09-26 PROCEDURE — C1713 ANCHOR/SCREW BN/BN,TIS/BN: HCPCS | Performed by: THORACIC SURGERY (CARDIOTHORACIC VASCULAR SURGERY)

## 2024-09-26 PROCEDURE — 33512 CABG VEIN THREE: CPT

## 2024-09-26 PROCEDURE — 85027 COMPLETE CBC AUTOMATED: CPT | Performed by: NURSE PRACTITIONER

## 2024-09-26 PROCEDURE — 84132 ASSAY OF SERUM POTASSIUM: CPT | Performed by: THORACIC SURGERY (CARDIOTHORACIC VASCULAR SURGERY)

## 2024-09-26 PROCEDURE — 25810000003 SODIUM CHLORIDE 0.9 % SOLUTION 250 ML FLEX CONT: Performed by: NURSE PRACTITIONER

## 2024-09-26 PROCEDURE — 25010000002 HEPARIN (PORCINE) PER 1000 UNITS: Performed by: THORACIC SURGERY (CARDIOTHORACIC VASCULAR SURGERY)

## 2024-09-26 PROCEDURE — 82565 ASSAY OF CREATININE: CPT

## 2024-09-26 PROCEDURE — 84295 ASSAY OF SERUM SODIUM: CPT

## 2024-09-26 PROCEDURE — 25010000002 MAGNESIUM SULFATE IN D5W 1G/100ML (PREMIX) 1-5 GM/100ML-% SOLUTION: Performed by: NURSE PRACTITIONER

## 2024-09-26 PROCEDURE — 25810000003 SODIUM CHLORIDE 0.9 % SOLUTION: Performed by: ANESTHESIOLOGY

## 2024-09-26 PROCEDURE — 85025 COMPLETE CBC W/AUTO DIFF WBC: CPT | Performed by: NURSE PRACTITIONER

## 2024-09-26 PROCEDURE — 33530 CORONARY ARTERY BYPASS/REOP: CPT

## 2024-09-26 PROCEDURE — 85576 BLOOD PLATELET AGGREGATION: CPT | Performed by: THORACIC SURGERY (CARDIOTHORACIC VASCULAR SURGERY)

## 2024-09-26 PROCEDURE — 93010 ELECTROCARDIOGRAM REPORT: CPT | Performed by: INTERNAL MEDICINE

## 2024-09-26 PROCEDURE — 33508 ENDOSCOPIC VEIN HARVEST: CPT | Performed by: THORACIC SURGERY (CARDIOTHORACIC VASCULAR SURGERY)

## 2024-09-26 PROCEDURE — P9041 ALBUMIN (HUMAN),5%, 50ML: HCPCS | Performed by: NURSE PRACTITIONER

## 2024-09-26 PROCEDURE — 80069 RENAL FUNCTION PANEL: CPT | Performed by: NURSE PRACTITIONER

## 2024-09-26 PROCEDURE — 94761 N-INVAS EAR/PLS OXIMETRY MLT: CPT

## 2024-09-26 PROCEDURE — 85730 THROMBOPLASTIN TIME PARTIAL: CPT | Performed by: THORACIC SURGERY (CARDIOTHORACIC VASCULAR SURGERY)

## 2024-09-26 PROCEDURE — 25010000002 CALCIUM GLUCONATE 2-0.675 GM/100ML-% SOLUTION: Performed by: NURSE PRACTITIONER

## 2024-09-26 PROCEDURE — 25010000002 ALBUMIN HUMAN 5% PER 50 ML: Performed by: ANESTHESIOLOGY

## 2024-09-26 PROCEDURE — 85014 HEMATOCRIT: CPT

## 2024-09-26 PROCEDURE — 06BQ4ZZ EXCISION OF LEFT SAPHENOUS VEIN, PERCUTANEOUS ENDOSCOPIC APPROACH: ICD-10-PCS | Performed by: THORACIC SURGERY (CARDIOTHORACIC VASCULAR SURGERY)

## 2024-09-26 PROCEDURE — 82803 BLOOD GASES ANY COMBINATION: CPT

## 2024-09-26 PROCEDURE — 84132 ASSAY OF SERUM POTASSIUM: CPT

## 2024-09-26 PROCEDURE — 85347 COAGULATION TIME ACTIVATED: CPT

## 2024-09-26 PROCEDURE — 33508 ENDOSCOPIC VEIN HARVEST: CPT

## 2024-09-26 PROCEDURE — 5A1221Z PERFORMANCE OF CARDIAC OUTPUT, CONTINUOUS: ICD-10-PCS | Performed by: THORACIC SURGERY (CARDIOTHORACIC VASCULAR SURGERY)

## 2024-09-26 PROCEDURE — 82947 ASSAY GLUCOSE BLOOD QUANT: CPT

## 2024-09-26 PROCEDURE — 82803 BLOOD GASES ANY COMBINATION: CPT | Performed by: NURSE PRACTITIONER

## 2024-09-26 PROCEDURE — 021209W BYPASS CORONARY ARTERY, THREE ARTERIES FROM AORTA WITH AUTOLOGOUS VENOUS TISSUE, OPEN APPROACH: ICD-10-PCS | Performed by: THORACIC SURGERY (CARDIOTHORACIC VASCULAR SURGERY)

## 2024-09-26 PROCEDURE — 25010000002 POTASSIUM CHLORIDE PER 2 MEQ: Performed by: THORACIC SURGERY (CARDIOTHORACIC VASCULAR SURGERY)

## 2024-09-26 PROCEDURE — 3E080GC INTRODUCTION OF OTHER THERAPEUTIC SUBSTANCE INTO HEART, OPEN APPROACH: ICD-10-PCS | Performed by: THORACIC SURGERY (CARDIOTHORACIC VASCULAR SURGERY)

## 2024-09-26 PROCEDURE — 82330 ASSAY OF CALCIUM: CPT

## 2024-09-26 PROCEDURE — 85610 PROTHROMBIN TIME: CPT | Performed by: NURSE PRACTITIONER

## 2024-09-26 PROCEDURE — 25010000002 PHENYLEPHRINE 10 MG/ML SOLUTION: Performed by: ANESTHESIOLOGY

## 2024-09-26 DEVICE — TEMP PACING WIRE
Type: IMPLANTABLE DEVICE | Site: HEART | Status: FUNCTIONAL
Brand: MYO/WIRE

## 2024-09-26 DEVICE — SS SUTURE, 3 PER SLEEVE
Type: IMPLANTABLE DEVICE | Site: STERNUM | Status: FUNCTIONAL
Brand: MYO/WIRE II

## 2024-09-26 DEVICE — ABSORBABLE HEMOSTAT (OXIDIZED REGENERATED CELLULOSE)
Type: IMPLANTABLE DEVICE | Site: CHEST | Status: FUNCTIONAL
Brand: SURGICEL

## 2024-09-26 DEVICE — SS SUTURE, 6 PER SLEEVE
Type: IMPLANTABLE DEVICE | Site: STERNUM | Status: FUNCTIONAL
Brand: MYO/WIRE II

## 2024-09-26 DEVICE — DEV CONTRL TISS STRATAFIXSPIRALMNCRYL PLSPS2 REV3/0 15CM: Type: IMPLANTABLE DEVICE | Site: LEG | Status: FUNCTIONAL

## 2024-09-26 DEVICE — WAX,BONE,NATURAL
Type: IMPLANTABLE DEVICE | Site: STERNUM | Status: FUNCTIONAL
Brand: MEDLINE INDUSTRIES

## 2024-09-26 DEVICE — DEV CONTRL TISS STRATAFIX SPIRAL MNCRYL UD 3/0 PLS 30CM: Type: IMPLANTABLE DEVICE | Site: CHEST | Status: FUNCTIONAL

## 2024-09-26 RX ORDER — DEXTROSE MONOHYDRATE 25 G/50ML
10-50 INJECTION, SOLUTION INTRAVENOUS
Status: ACTIVE | OUTPATIENT
Start: 2024-09-26 | End: 2024-09-29

## 2024-09-26 RX ORDER — VASOPRESSIN IN DEXTROSE 5 % 20/100 ML
.02-.1 PLASTIC BAG, INJECTION (ML) INTRAVENOUS AS NEEDED
Status: DISCONTINUED | OUTPATIENT
Start: 2024-09-26 | End: 2024-09-27

## 2024-09-26 RX ORDER — ACETAMINOPHEN 160 MG/5ML
650 SOLUTION ORAL EVERY 4 HOURS PRN
Status: DISCONTINUED | OUTPATIENT
Start: 2024-09-27 | End: 2024-10-02 | Stop reason: HOSPADM

## 2024-09-26 RX ORDER — ENOXAPARIN SODIUM 100 MG/ML
40 INJECTION SUBCUTANEOUS DAILY
Status: DISCONTINUED | OUTPATIENT
Start: 2024-09-27 | End: 2024-09-27

## 2024-09-26 RX ORDER — NALOXONE HCL 0.4 MG/ML
0.4 VIAL (ML) INJECTION
Status: DISCONTINUED | OUTPATIENT
Start: 2024-09-26 | End: 2024-09-26

## 2024-09-26 RX ORDER — ACETAMINOPHEN 650 MG/1
650 SUPPOSITORY RECTAL EVERY 4 HOURS PRN
Status: DISCONTINUED | OUTPATIENT
Start: 2024-09-27 | End: 2024-10-02 | Stop reason: HOSPADM

## 2024-09-26 RX ORDER — NALOXONE HCL 0.4 MG/ML
0.4 VIAL (ML) INJECTION
Status: DISCONTINUED | OUTPATIENT
Start: 2024-09-26 | End: 2024-10-02 | Stop reason: HOSPADM

## 2024-09-26 RX ORDER — ASPIRIN 81 MG/1
81 TABLET ORAL DAILY
Status: DISCONTINUED | OUTPATIENT
Start: 2024-09-27 | End: 2024-10-02 | Stop reason: HOSPADM

## 2024-09-26 RX ORDER — CHLORHEXIDINE GLUCONATE 500 MG/1
1 CLOTH TOPICAL EVERY 12 HOURS PRN
Status: DISCONTINUED | OUTPATIENT
Start: 2024-09-26 | End: 2024-09-26

## 2024-09-26 RX ORDER — CALCIUM GLUCONATE 20 MG/ML
2000 INJECTION, SOLUTION INTRAVENOUS ONCE
Status: COMPLETED | OUTPATIENT
Start: 2024-09-26 | End: 2024-09-26

## 2024-09-26 RX ORDER — AMINOCAPROIC ACID 250 MG/ML
INJECTION, SOLUTION INTRAVENOUS AS NEEDED
Status: DISCONTINUED | OUTPATIENT
Start: 2024-09-26 | End: 2024-09-26 | Stop reason: SURG

## 2024-09-26 RX ORDER — SODIUM CHLORIDE 9 MG/ML
30 INJECTION, SOLUTION INTRAVENOUS CONTINUOUS
Status: DISPENSED | OUTPATIENT
Start: 2024-09-26 | End: 2024-09-29

## 2024-09-26 RX ORDER — ONDANSETRON 2 MG/ML
4 INJECTION INTRAMUSCULAR; INTRAVENOUS EVERY 6 HOURS PRN
Status: DISCONTINUED | OUTPATIENT
Start: 2024-09-26 | End: 2024-10-02 | Stop reason: HOSPADM

## 2024-09-26 RX ORDER — NICOTINE POLACRILEX 4 MG
15 LOZENGE BUCCAL
Status: ACTIVE | OUTPATIENT
Start: 2024-09-26 | End: 2024-09-29

## 2024-09-26 RX ORDER — ACETAMINOPHEN 650 MG
TABLET, EXTENDED RELEASE ORAL AS NEEDED
Status: DISCONTINUED | OUTPATIENT
Start: 2024-09-26 | End: 2024-09-26 | Stop reason: HOSPADM

## 2024-09-26 RX ORDER — FENTANYL CITRATE 50 UG/ML
INJECTION, SOLUTION INTRAMUSCULAR; INTRAVENOUS AS NEEDED
Status: DISCONTINUED | OUTPATIENT
Start: 2024-09-26 | End: 2024-09-26 | Stop reason: SURG

## 2024-09-26 RX ORDER — MAGNESIUM SULFATE 1 G/100ML
1 INJECTION INTRAVENOUS EVERY 8 HOURS
Status: COMPLETED | OUTPATIENT
Start: 2024-09-26 | End: 2024-09-27

## 2024-09-26 RX ORDER — CHLORHEXIDINE GLUCONATE ORAL RINSE 1.2 MG/ML
15 SOLUTION DENTAL ONCE
Status: COMPLETED | OUTPATIENT
Start: 2024-09-26 | End: 2024-09-26

## 2024-09-26 RX ORDER — PROPOFOL 10 MG/ML
INJECTION, EMULSION INTRAVENOUS AS NEEDED
Status: DISCONTINUED | OUTPATIENT
Start: 2024-09-26 | End: 2024-09-26 | Stop reason: SURG

## 2024-09-26 RX ORDER — VECURONIUM BROMIDE 1 MG/ML
INJECTION, POWDER, LYOPHILIZED, FOR SOLUTION INTRAVENOUS AS NEEDED
Status: DISCONTINUED | OUTPATIENT
Start: 2024-09-26 | End: 2024-09-26 | Stop reason: SURG

## 2024-09-26 RX ORDER — NICARDIPINE HYDROCHLORIDE 2.5 MG/ML
INJECTION INTRAVENOUS AS NEEDED
Status: DISCONTINUED | OUTPATIENT
Start: 2024-09-26 | End: 2024-09-26 | Stop reason: SURG

## 2024-09-26 RX ORDER — NITROGLYCERIN 20 MG/100ML
INJECTION INTRAVENOUS CONTINUOUS PRN
Status: DISCONTINUED | OUTPATIENT
Start: 2024-09-26 | End: 2024-09-26 | Stop reason: SURG

## 2024-09-26 RX ORDER — NITROGLYCERIN 20 MG/100ML
5-50 INJECTION INTRAVENOUS CONTINUOUS PRN
Status: DISCONTINUED | OUTPATIENT
Start: 2024-09-26 | End: 2024-09-27

## 2024-09-26 RX ORDER — NICOTINE POLACRILEX 4 MG
15 LOZENGE BUCCAL
Status: DISCONTINUED | OUTPATIENT
Start: 2024-09-26 | End: 2024-09-26

## 2024-09-26 RX ORDER — NOREPINEPHRINE BITARTRATE 0.03 MG/ML
.02-.06 INJECTION, SOLUTION INTRAVENOUS CONTINUOUS PRN
Status: DISCONTINUED | OUTPATIENT
Start: 2024-09-26 | End: 2024-09-27

## 2024-09-26 RX ORDER — ATORVASTATIN CALCIUM 40 MG/1
40 TABLET, FILM COATED ORAL NIGHTLY
Status: DISCONTINUED | OUTPATIENT
Start: 2024-09-27 | End: 2024-09-26

## 2024-09-26 RX ORDER — ACETAMINOPHEN 325 MG/1
650 TABLET ORAL EVERY 4 HOURS PRN
Status: DISCONTINUED | OUTPATIENT
Start: 2024-09-27 | End: 2024-10-02 | Stop reason: HOSPADM

## 2024-09-26 RX ORDER — MORPHINE SULFATE 2 MG/ML
2 INJECTION, SOLUTION INTRAMUSCULAR; INTRAVENOUS
Status: DISPENSED | OUTPATIENT
Start: 2024-09-26 | End: 2024-09-29

## 2024-09-26 RX ORDER — CHLORHEXIDINE GLUCONATE ORAL RINSE 1.2 MG/ML
15 SOLUTION DENTAL EVERY 12 HOURS
Status: DISCONTINUED | OUTPATIENT
Start: 2024-09-26 | End: 2024-09-26

## 2024-09-26 RX ORDER — NITROGLYCERIN 0.4 MG/1
0.4 TABLET SUBLINGUAL
Status: DISCONTINUED | OUTPATIENT
Start: 2024-09-26 | End: 2024-10-02 | Stop reason: HOSPADM

## 2024-09-26 RX ORDER — CYCLOBENZAPRINE HCL 10 MG
5 TABLET ORAL 3 TIMES DAILY PRN
Status: DISCONTINUED | OUTPATIENT
Start: 2024-09-27 | End: 2024-10-02 | Stop reason: HOSPADM

## 2024-09-26 RX ORDER — DEXTROSE MONOHYDRATE 25 G/50ML
10-50 INJECTION, SOLUTION INTRAVENOUS
Status: DISCONTINUED | OUTPATIENT
Start: 2024-09-26 | End: 2024-09-26

## 2024-09-26 RX ORDER — ASPIRIN 325 MG
325 TABLET ORAL ONCE
Status: COMPLETED | OUTPATIENT
Start: 2024-09-26 | End: 2024-09-26

## 2024-09-26 RX ORDER — PANTOPRAZOLE SODIUM 40 MG/1
40 TABLET, DELAYED RELEASE ORAL
Status: DISCONTINUED | OUTPATIENT
Start: 2024-09-27 | End: 2024-10-02 | Stop reason: HOSPADM

## 2024-09-26 RX ORDER — IBUPROFEN 600 MG/1
1 TABLET ORAL
Status: ACTIVE | OUTPATIENT
Start: 2024-09-26 | End: 2024-09-29

## 2024-09-26 RX ORDER — SODIUM CHLORIDE 9 MG/ML
30 INJECTION, SOLUTION INTRAVENOUS CONTINUOUS PRN
Status: DISCONTINUED | OUTPATIENT
Start: 2024-09-26 | End: 2024-09-26

## 2024-09-26 RX ORDER — HEPARIN SODIUM 1000 [USP'U]/ML
INJECTION, SOLUTION INTRAVENOUS; SUBCUTANEOUS AS NEEDED
Status: DISCONTINUED | OUTPATIENT
Start: 2024-09-26 | End: 2024-09-26 | Stop reason: SURG

## 2024-09-26 RX ORDER — POLYETHYLENE GLYCOL 3350 17 G/17G
17 POWDER, FOR SOLUTION ORAL 2 TIMES DAILY
Status: DISCONTINUED | OUTPATIENT
Start: 2024-09-27 | End: 2024-10-02 | Stop reason: HOSPADM

## 2024-09-26 RX ORDER — MEPERIDINE HYDROCHLORIDE 25 MG/ML
25 INJECTION INTRAMUSCULAR; INTRAVENOUS; SUBCUTANEOUS ONCE AS NEEDED
Status: DISCONTINUED | OUTPATIENT
Start: 2024-09-26 | End: 2024-09-27

## 2024-09-26 RX ORDER — ROSUVASTATIN CALCIUM 10 MG/1
40 TABLET, COATED ORAL NIGHTLY
Status: DISCONTINUED | OUTPATIENT
Start: 2024-09-27 | End: 2024-10-02 | Stop reason: HOSPADM

## 2024-09-26 RX ORDER — CHLORHEXIDINE GLUCONATE ORAL RINSE 1.2 MG/ML
15 SOLUTION DENTAL EVERY 12 HOURS
Status: DISCONTINUED | OUTPATIENT
Start: 2024-09-26 | End: 2024-10-02 | Stop reason: HOSPADM

## 2024-09-26 RX ORDER — POTASSIUM CHLORIDE 29.8 MG/ML
20 INJECTION INTRAVENOUS
Status: COMPLETED | OUTPATIENT
Start: 2024-09-26 | End: 2024-09-26

## 2024-09-26 RX ORDER — NOREPINEPHRINE BITARTRATE 0.03 MG/ML
INJECTION, SOLUTION INTRAVENOUS CONTINUOUS PRN
Status: DISCONTINUED | OUTPATIENT
Start: 2024-09-26 | End: 2024-09-26 | Stop reason: SURG

## 2024-09-26 RX ORDER — SODIUM CHLORIDE 0.9 % (FLUSH) 0.9 %
30 SYRINGE (ML) INJECTION ONCE AS NEEDED
Status: DISCONTINUED | OUTPATIENT
Start: 2024-09-26 | End: 2024-09-26

## 2024-09-26 RX ORDER — DEXMEDETOMIDINE HYDROCHLORIDE 4 UG/ML
.2-1.5 INJECTION, SOLUTION INTRAVENOUS
Status: DISCONTINUED | OUTPATIENT
Start: 2024-09-26 | End: 2024-09-27

## 2024-09-26 RX ORDER — ACETAMINOPHEN 10 MG/ML
1000 INJECTION, SOLUTION INTRAVENOUS EVERY 8 HOURS
Status: COMPLETED | OUTPATIENT
Start: 2024-09-26 | End: 2024-09-27

## 2024-09-26 RX ORDER — HYDROCODONE BITARTRATE AND ACETAMINOPHEN 5; 325 MG/1; MG/1
1 TABLET ORAL EVERY 4 HOURS PRN
Status: DISCONTINUED | OUTPATIENT
Start: 2024-09-26 | End: 2024-10-02 | Stop reason: HOSPADM

## 2024-09-26 RX ORDER — IBUPROFEN 600 MG/1
1 TABLET ORAL
Status: DISCONTINUED | OUTPATIENT
Start: 2024-09-26 | End: 2024-09-26

## 2024-09-26 RX ORDER — ACETAMINOPHEN 10 MG/ML
INJECTION, SOLUTION INTRAVENOUS AS NEEDED
Status: DISCONTINUED | OUTPATIENT
Start: 2024-09-26 | End: 2024-09-26 | Stop reason: SURG

## 2024-09-26 RX ORDER — MAGNESIUM HYDROXIDE 1200 MG/15ML
LIQUID ORAL AS NEEDED
Status: DISCONTINUED | OUTPATIENT
Start: 2024-09-26 | End: 2024-09-26 | Stop reason: HOSPADM

## 2024-09-26 RX ORDER — ALBUMIN, HUMAN INJ 5% 5 %
SOLUTION INTRAVENOUS CONTINUOUS PRN
Status: DISCONTINUED | OUTPATIENT
Start: 2024-09-26 | End: 2024-09-26 | Stop reason: SURG

## 2024-09-26 RX ORDER — PANTOPRAZOLE SODIUM 40 MG/10ML
40 INJECTION, POWDER, LYOPHILIZED, FOR SOLUTION INTRAVENOUS ONCE
Status: COMPLETED | OUTPATIENT
Start: 2024-09-26 | End: 2024-09-26

## 2024-09-26 RX ORDER — SODIUM CHLORIDE 9 MG/ML
INJECTION, SOLUTION INTRAVENOUS CONTINUOUS PRN
Status: DISCONTINUED | OUTPATIENT
Start: 2024-09-26 | End: 2024-09-26 | Stop reason: SURG

## 2024-09-26 RX ORDER — PHENYLEPHRINE HYDROCHLORIDE 10 MG/ML
INJECTION INTRAVENOUS AS NEEDED
Status: DISCONTINUED | OUTPATIENT
Start: 2024-09-26 | End: 2024-09-26 | Stop reason: SURG

## 2024-09-26 RX ORDER — MIDAZOLAM HYDROCHLORIDE 1 MG/ML
INJECTION INTRAMUSCULAR; INTRAVENOUS AS NEEDED
Status: DISCONTINUED | OUTPATIENT
Start: 2024-09-26 | End: 2024-09-26 | Stop reason: SURG

## 2024-09-26 RX ORDER — AMOXICILLIN 250 MG
2 CAPSULE ORAL 2 TIMES DAILY
Status: DISCONTINUED | OUTPATIENT
Start: 2024-09-26 | End: 2024-10-02 | Stop reason: HOSPADM

## 2024-09-26 RX ORDER — NOREPINEPHRINE BITARTRATE 0.03 MG/ML
INJECTION, SOLUTION INTRAVENOUS CONTINUOUS PRN
Status: DISCONTINUED | OUTPATIENT
Start: 2024-09-26 | End: 2024-09-26

## 2024-09-26 RX ORDER — ALBUMIN, HUMAN INJ 5% 5 %
12.5 SOLUTION INTRAVENOUS AS NEEDED
Status: COMPLETED | OUTPATIENT
Start: 2024-09-26 | End: 2024-09-26

## 2024-09-26 RX ADMIN — MUPIROCIN 1 APPLICATION: 20 OINTMENT TOPICAL at 20:37

## 2024-09-26 RX ADMIN — ALBUMIN (HUMAN) 12.5 G: 12.5 INJECTION, SOLUTION INTRAVENOUS at 20:37

## 2024-09-26 RX ADMIN — CHLORHEXIDINE GLUCONATE, 0.12% ORAL RINSE 15 ML: 1.2 SOLUTION DENTAL at 20:37

## 2024-09-26 RX ADMIN — VECURONIUM BROMIDE 4 MG: 1 INJECTION, POWDER, LYOPHILIZED, FOR SOLUTION INTRAVENOUS at 09:50

## 2024-09-26 RX ADMIN — ONDANSETRON 4 MG: 2 INJECTION, SOLUTION INTRAMUSCULAR; INTRAVENOUS at 15:51

## 2024-09-26 RX ADMIN — NICARDIPINE HYDROCHLORIDE 2.5 MG/HR: 25 INJECTION, SOLUTION INTRAVENOUS at 11:43

## 2024-09-26 RX ADMIN — VECURONIUM BROMIDE 4 MG: 1 INJECTION, POWDER, LYOPHILIZED, FOR SOLUTION INTRAVENOUS at 08:41

## 2024-09-26 RX ADMIN — PANTOPRAZOLE SODIUM 40 MG: 40 INJECTION, POWDER, FOR SOLUTION INTRAVENOUS at 18:03

## 2024-09-26 RX ADMIN — VECURONIUM BROMIDE 6 MG: 1 INJECTION, POWDER, LYOPHILIZED, FOR SOLUTION INTRAVENOUS at 10:30

## 2024-09-26 RX ADMIN — VECURONIUM BROMIDE 2 MG: 1 INJECTION, POWDER, LYOPHILIZED, FOR SOLUTION INTRAVENOUS at 08:16

## 2024-09-26 RX ADMIN — SENNOSIDES AND DOCUSATE SODIUM 2 TABLET: 50; 8.6 TABLET ORAL at 20:37

## 2024-09-26 RX ADMIN — ASPIRIN 325 MG ORAL TABLET 325 MG: 325 PILL ORAL at 18:42

## 2024-09-26 RX ADMIN — Medication 25 MG: at 06:49

## 2024-09-26 RX ADMIN — ACETAMINOPHEN 1000 MG: 10 INJECTION, SOLUTION INTRAVENOUS at 10:18

## 2024-09-26 RX ADMIN — VECURONIUM BROMIDE 14 MG: 1 INJECTION, POWDER, LYOPHILIZED, FOR SOLUTION INTRAVENOUS at 07:13

## 2024-09-26 RX ADMIN — DEXMEDETOMIDINE HYDROCHLORIDE 0.5 MCG/KG/HR: 4 INJECTION, SOLUTION INTRAVENOUS at 15:17

## 2024-09-26 RX ADMIN — SODIUM PHOSPHATE, MONOBASIC, MONOHYDRATE AND SODIUM PHOSPHATE, DIBASIC ANHYDROUS 15 MMOL: 142; 276 INJECTION, SOLUTION INTRAVENOUS at 14:16

## 2024-09-26 RX ADMIN — ALBUMIN (HUMAN) 12.5 G: 12.5 INJECTION, SOLUTION INTRAVENOUS at 16:15

## 2024-09-26 RX ADMIN — SODIUM PHOSPHATE, MONOBASIC, MONOHYDRATE AND SODIUM PHOSPHATE, DIBASIC ANHYDROUS 15 MMOL: 142; 276 INJECTION, SOLUTION INTRAVENOUS at 15:12

## 2024-09-26 RX ADMIN — ALBUMIN (HUMAN) 12.5 G: 12.5 INJECTION, SOLUTION INTRAVENOUS at 12:59

## 2024-09-26 RX ADMIN — Medication 25 MG: at 06:40

## 2024-09-26 RX ADMIN — SODIUM CHLORIDE: 9 INJECTION, SOLUTION INTRAVENOUS at 06:26

## 2024-09-26 RX ADMIN — ACETAMINOPHEN 1000 MG: 10 INJECTION INTRAVENOUS at 18:01

## 2024-09-26 RX ADMIN — HEPARIN SODIUM 25000 UNITS: 1000 INJECTION INTRAVENOUS; SUBCUTANEOUS at 08:31

## 2024-09-26 RX ADMIN — FENTANYL CITRATE 1500 MCG: 50 INJECTION, SOLUTION INTRAMUSCULAR; INTRAVENOUS at 07:13

## 2024-09-26 RX ADMIN — SODIUM PHOSPHATE, MONOBASIC, MONOHYDRATE AND SODIUM PHOSPHATE, DIBASIC ANHYDROUS 15 MMOL: 142; 276 INJECTION, SOLUTION INTRAVENOUS at 16:26

## 2024-09-26 RX ADMIN — CEFAZOLIN 2 G: 2 INJECTION, POWDER, LYOPHILIZED, FOR SOLUTION INTRAVENOUS at 10:18

## 2024-09-26 RX ADMIN — HYDROCODONE BITARTRATE AND ACETAMINOPHEN 1 TABLET: 5; 325 TABLET ORAL at 21:27

## 2024-09-26 RX ADMIN — MAGNESIUM SULFATE HEPTAHYDRATE 2 G: 500 INJECTION, SOLUTION INTRAMUSCULAR; INTRAVENOUS at 09:55

## 2024-09-26 RX ADMIN — CALCIUM GLUCONATE 2000 MG: 20 INJECTION, SOLUTION INTRAVENOUS at 15:42

## 2024-09-26 RX ADMIN — MIDAZOLAM 5 MG: 1 INJECTION INTRAMUSCULAR; INTRAVENOUS at 06:40

## 2024-09-26 RX ADMIN — POTASSIUM CHLORIDE 20 MEQ: 400 INJECTION, SOLUTION INTRAVENOUS at 20:37

## 2024-09-26 RX ADMIN — Medication 0.05 MCG/KG/MIN: at 09:10

## 2024-09-26 RX ADMIN — ALBUMIN (HUMAN) 12.5 G: 12.5 INJECTION, SOLUTION INTRAVENOUS at 22:51

## 2024-09-26 RX ADMIN — AMINOCAPROIC ACID 10 G: 250 INJECTION, SOLUTION INTRAVENOUS at 10:18

## 2024-09-26 RX ADMIN — MORPHINE SULFATE 2 MG: 4 INJECTION, SOLUTION INTRAMUSCULAR; INTRAVENOUS at 20:38

## 2024-09-26 RX ADMIN — NICARDIPINE HYDROCHLORIDE 0.5 MCG: 25 INJECTION, SOLUTION INTRAVENOUS at 10:15

## 2024-09-26 RX ADMIN — MAGNESIUM SULFATE IN DEXTROSE 1 G: 10 INJECTION, SOLUTION INTRAVENOUS at 18:24

## 2024-09-26 RX ADMIN — CEFAZOLIN 2 G: 2 INJECTION, POWDER, LYOPHILIZED, FOR SOLUTION INTRAVENOUS at 07:25

## 2024-09-26 RX ADMIN — POTASSIUM CHLORIDE 20 MEQ: 400 INJECTION, SOLUTION INTRAVENOUS at 19:11

## 2024-09-26 RX ADMIN — AMINOCAPROIC ACID 10 G: 250 INJECTION, SOLUTION INTRAVENOUS at 07:13

## 2024-09-26 RX ADMIN — NITROGLYCERIN 10 MCG/MIN: 20 INJECTION INTRAVENOUS at 10:17

## 2024-09-26 RX ADMIN — ALBUMIN HUMAN: 0.05 INJECTION, SOLUTION INTRAVENOUS at 11:09

## 2024-09-26 RX ADMIN — 0.12% CHLORHEXIDINE GLUCONATE 15 ML: 1.2 RINSE ORAL at 06:16

## 2024-09-26 RX ADMIN — MUPIROCIN 1 APPLICATION: 20 OINTMENT TOPICAL at 06:16

## 2024-09-26 RX ADMIN — VECURONIUM BROMIDE 4 MG: 1 INJECTION, POWDER, LYOPHILIZED, FOR SOLUTION INTRAVENOUS at 08:56

## 2024-09-26 RX ADMIN — PROTAMINE SULFATE 300 MG: 10 INJECTION, SOLUTION INTRAVENOUS at 10:00

## 2024-09-26 RX ADMIN — PHENYLEPHRINE HYDROCHLORIDE 50 MCG: 10 INJECTION INTRAVENOUS at 07:24

## 2024-09-26 RX ADMIN — PHENYLEPHRINE HYDROCHLORIDE 100 MCG: 10 INJECTION INTRAVENOUS at 07:31

## 2024-09-26 RX ADMIN — PROPOFOL 100 MG: 10 INJECTION, EMULSION INTRAVENOUS at 07:13

## 2024-09-26 RX ADMIN — DEXMEDETOMIDINE HYDROCHLORIDE 0.5 MCG/KG/HR: 100 INJECTION, SOLUTION INTRAVENOUS at 07:13

## 2024-09-26 RX ADMIN — Medication 12.5 MG: at 06:15

## 2024-09-26 RX ADMIN — SODIUM CHLORIDE 2000 MG: 900 INJECTION INTRAVENOUS at 18:15

## 2024-09-26 RX ADMIN — MORPHINE SULFATE 2 MG: 4 INJECTION, SOLUTION INTRAMUSCULAR; INTRAVENOUS at 14:15

## 2024-09-26 RX ADMIN — PHENYLEPHRINE HYDROCHLORIDE 100 MCG: 10 INJECTION INTRAVENOUS at 08:06

## 2024-09-26 RX ADMIN — INSULIN HUMAN 6 UNITS/HR: 1 INJECTION, SOLUTION INTRAVENOUS at 07:13

## 2024-09-26 NOTE — SIGNIFICANT NOTE
09/26/24 1138   OTHER   Discipline physical therapist   Rehab Time/Intention   Session Not Performed unable to evaluate, medical status change;other (see comments)  (in cardiac sx at this time; will eval in AM)   Recommendation   PT - Next Appointment 09/27/24

## 2024-09-26 NOTE — PROGRESS NOTES
CARDIOLOGY PROGRESS NOTE:    Rodney Chaney  59 y.o.  male  1965  [id number]      Referring Provider: Cardiac surgeon    Reason for follow-up: Status post coronary artery bypass     Patient Care Team:  Prabhu Downing MD as PCP - General  Prabhu Downing MD as PCP - Family Medicine  Ham Jacinto MD as Consulting Physician (Cardiology)    Subjective .  Patient intubated    Objective intubated     Review of Systems   Unable to perform ROS: Intubated       Allergies: Levofloxacin    Scheduled Meds:acetaminophen, 1,000 mg, Intravenous, Q8H  [START ON 9/27/2024] aspirin, 81 mg, Oral, Daily  aspirin, 325 mg, Oral, Once  ceFAZolin, 2,000 mg, Intravenous, Q8H  chlorhexidine, 15 mL, Mouth/Throat, Q12H  [START ON 9/27/2024] enoxaparin, 40 mg, Subcutaneous, Daily  magnesium sulfate, 1 g, Intravenous, Q8H  mupirocin, , Each Nare, BID  [START ON 9/27/2024] pantoprazole, 40 mg, Oral, Q AM  pantoprazole, 40 mg, Intravenous, Once  [START ON 9/27/2024] polyethylene glycol, 17 g, Oral, BID  [START ON 9/27/2024] rosuvastatin, 40 mg, Oral, Nightly  senna-docusate sodium, 2 tablet, Oral, BID      Continuous Infusions:dexmedetomidine, 0.2-1.5 mcg/kg/hr, Last Rate: 0.5 mcg/kg/hr (09/26/24 1517)  insulin, 0-100 Units/hr, Last Rate: 3.2 Units/hr (09/26/24 1647)  niCARdipine, 5-15 mg/hr, Last Rate: 2.5 mg/hr (09/26/24 1647)  nitroglycerin, 5-50 mcg/min, Last Rate: Stopped (09/26/24 1310)  norepinephrine, 0.02-0.06 mcg/kg/min  sodium chloride, 30 mL/hr, Last Rate: 30 mL/hr (09/26/24 1151)      PRN Meds:.  [START ON 9/27/2024] acetaminophen **OR** [START ON 9/27/2024] acetaminophen **OR** [START ON 9/27/2024] acetaminophen    albumin human    Calcium Replacement - Follow Nurse / BPA Driven Protocol    [START ON 9/27/2024] cyclobenzaprine    dextrose    dextrose    glucagon (human recombinant)    HYDROcodone-acetaminophen    Magnesium Cardiology Dose Replacement - Follow Nurse / BPA Driven Protocol    meperidine    Morphine **AND**  "naloxone    niCARdipine    nitroglycerin    nitroglycerin    norepinephrine    ondansetron    Phosphorus Replacement - Follow Nurse / BPA Driven Protocol    Potassium Replacement - Follow Nurse / BPA Driven Protocol    vasopressin        VITAL SIGNS  Vitals:    09/26/24 1310 09/26/24 1335 09/26/24 1542 09/26/24 1551   BP:   133/76    Patient Position:   Lying    Pulse: 81 80 80 79   Resp:  12 12 12   Temp: 97 °F (36.1 °C)  98.1 °F (36.7 °C)    TempSrc:   Bladder    SpO2: 100% 100% 100% 100%   Weight:       Height:           Flowsheet Rows      Flowsheet Row First Filed Value   Admission Height 182.9 cm (72.01\") Documented at 09/26/2024 1250   Admission Weight 85.3 kg (188 lb 0.8 oz) Documented at 09/26/2024 1250             TELEMETRY: Ventricular pacemaker rhythm    Physical Exam:  Constitutional:       Appearance: Well-developed.   Eyes:      General: No scleral icterus.     Conjunctiva/sclera: Conjunctivae normal.      Pupils: Pupils are equal, round, and reactive to light.   HENT:      Head: Normocephalic and atraumatic.   Neck:      Vascular: No carotid bruit or JVD.   Pulmonary:      Effort: Pulmonary effort is normal.      Breath sounds: Normal breath sounds. No wheezing. No rales.   Cardiovascular:      Normal rate. Regular rhythm.   Pulses:     Intact distal pulses.   Abdominal:      General: Bowel sounds are normal.      Palpations: Abdomen is soft.   Musculoskeletal: Normal range of motion.      Cervical back: Normal range of motion and neck supple. Skin:     General: Skin is warm and dry.      Findings: No rash.   Neurological:      Mental Status: Alert.      Comments: No focal deficits          Results Review:   I reviewed the patient's new clinical results.  Lab Results (last 24 hours)       Procedure Component Value Units Date/Time    Potassium [[id number]]  (Normal) Collected: 09/26/24 1623    Specimen: Blood from Arm, Left Updated: 09/26/24 1648     Potassium 3.6 mmol/L     Blood Gas, Arterial - " [[id number]]  (Abnormal) Collected: 09/26/24 1638    Specimen: Arterial Blood Updated: 09/26/24 1643     Site Arterial Line     Sam's Test N/A     pH, Arterial 7.425 pH units      pCO2, Arterial 35.5 mm Hg      pO2, Arterial 127.9 mm Hg      HCO3, Arterial 23.3 mmol/L      Base Excess, Arterial -0.7 mmol/L      Comment: Serial Number: [id number]:  [id number]        O2 Saturation, Arterial 99.0 %      CO2 Content 24.4 mmol/L      Barometric Pressure for Blood Gas --     Comment: N/A        Modality Adult Vent     FIO2 40 %      Ventilator Mode AC     Set Tidal Volume 750     PEEP 5     Hemodilution Yes     Respiratory Rate 12     PO2/FIO2 320    POCT Electrolytes +HGB +HCT [[id number]]  (Abnormal) Collected: 09/26/24 1638    Specimen: Arterial Blood Updated: 09/26/24 1643     Sodium 143 mmol/L      POC Potassium 3.5 mmol/L      Ionized Calcium 1.21 mmol/L      Comment: Serial Number: [id number]:  [id number]        Glucose 188 mg/dL      Hematocrit 34 %      Hemoglobin 11.6 g/dL     POC Glucose Once [[id number]]  (Abnormal) Collected: 09/26/24 1638    Specimen: Arterial Blood Updated: 09/26/24 1643     Glucose 188 mg/dL      Comment: Serial Number: [id number]:  [id number]       CBC (No Diff) [[id number]]  (Abnormal) Collected: 09/26/24 1623    Specimen: Blood Updated: 09/26/24 1633     WBC 6.99 10*3/mm3      RBC 4.05 10*6/mm3      Hemoglobin 11.2 g/dL      Hematocrit 34.4 %      MCV 84.9 fL      MCH 27.7 pg      MCHC 32.6 g/dL      RDW 14.6 %      RDW-SD 45.3 fl      MPV 10.2 fL      Platelets 100 10*3/mm3     Blood Gas, Arterial - [[id number]]  (Abnormal) Collected: 09/26/24 1527    Specimen: Arterial Blood Updated: 09/26/24 1532     Site Arterial Line     Sam's Test N/A     pH, Arterial 7.442 pH units      pCO2, Arterial 33.3 mm Hg      pO2, Arterial 135.1 mm Hg      HCO3, Arterial 22.7 mmol/L      Base Excess, Arterial -0.9 mmol/L      Comment: Serial Number: [id number]:  [id number]        O2 Saturation, Arterial 99.2 %       CO2 Content 23.8 mmol/L      Barometric Pressure for Blood Gas --     Comment: N/A        Modality Adult Vent     FIO2 40 %      Ventilator Mode AC     Set Tidal Volume 800     PEEP 8     Hemodilution Yes     Respiratory Rate 12     PO2/FIO2 338    POCT Electrolytes +HGB +HCT [[id number]]  (Abnormal) Collected: 09/26/24 1527    Specimen: Arterial Blood Updated: 09/26/24 1532     Sodium 142 mmol/L      POC Potassium 3.9 mmol/L      Ionized Calcium 1.12 mmol/L      Comment: Serial Number: [id number]:  [id number]        Glucose 183 mg/dL      Hematocrit 35 %      Hemoglobin 11.8 g/dL     POC Glucose Once [[id number]]  (Abnormal) Collected: 09/26/24 1527    Specimen: Arterial Blood Updated: 09/26/24 1532     Glucose 183 mg/dL      Comment: Serial Number: [id number]:  [id number]       Blood Gas, Arterial - [[id number]]  (Abnormal) Collected: 09/26/24 1358    Specimen: Arterial Blood Updated: 09/26/24 1402     Site Arterial Line     Sam's Test N/A     pH, Arterial 7.461 pH units      pCO2, Arterial 33.8 mm Hg      pO2, Arterial 188.6 mm Hg      HCO3, Arterial 24.1 mmol/L      Base Excess, Arterial 0.6 mmol/L      Comment: Serial Number: [id number]:  [id number]        O2 Saturation, Arterial 99.7 %      CO2 Content 25.1 mmol/L      Barometric Pressure for Blood Gas --     Comment: N/A        Modality Adult Vent     FIO2 60 %      Ventilator Mode AC     Set Tidal Volume 800     PEEP 8     Hemodilution Yes     Respiratory Rate 12     PO2/FIO2 314    POCT Electrolytes +HGB +HCT [[id number]]  (Abnormal) Collected: 09/26/24 1358    Specimen: Arterial Blood Updated: 09/26/24 1402     Sodium 142 mmol/L      POC Potassium 3.8 mmol/L      Ionized Calcium 1.16 mmol/L      Comment: Serial Number: [id number]:  [id number]        Glucose 132 mg/dL      Hematocrit 35 %      Hemoglobin 11.7 g/dL     POC Glucose Once [[id number]]  (Abnormal) Collected: 09/26/24 1358    Specimen: Arterial Blood Updated: 09/26/24 1402     Glucose 132 mg/dL       Comment: Serial Number: [id number]:  [id number]       Blood Gas, Arterial - [[id number]]  (Abnormal) Collected: 09/26/24 1229    Specimen: Arterial Blood Updated: 09/26/24 1237     Site Arterial Line     Sam's Test N/A     pH, Arterial 7.503 pH units      pCO2, Arterial 23.9 mm Hg      pO2, Arterial 243.0 mm Hg      HCO3, Arterial 18.8 mmol/L      Base Excess, Arterial -3.0 mmol/L      Comment: Serial Number: [id number]:  [id number]        O2 Saturation, Arterial 99.9 %      CO2 Content 19.5 mmol/L      Barometric Pressure for Blood Gas --     Comment: N/A        Modality Adult Vent     FIO2 100 %      Ventilator Mode AC     Set Tidal Volume 800     PEEP 8     Hemodilution Yes     Respiratory Rate 14     PO2/FIO2 243    POCT Electrolytes +HGB +HCT [[id number]]  (Abnormal) Collected: 09/26/24 1229    Specimen: Arterial Blood Updated: 09/26/24 1237     Sodium 139 mmol/L      POC Potassium 3.8 mmol/L      Ionized Calcium 1.10 mmol/L      Comment: Serial Number: [id number]:  [id number]        Glucose 121 mg/dL      Hematocrit 33 %      Hemoglobin 11.4 g/dL     POC Glucose Once [[id number]]  (Abnormal) Collected: 09/26/24 1229    Specimen: Arterial Blood Updated: 09/26/24 1237     Glucose 121 mg/dL      Comment: Serial Number: [id number]:  [id number]       Renal Function Panel [[id number]]  (Abnormal) Collected: 09/26/24 1129    Specimen: Blood Updated: 09/26/24 1209     Glucose 133 mg/dL      BUN 15 mg/dL      Creatinine 0.98 mg/dL      Sodium 139 mmol/L      Potassium 4.1 mmol/L      Chloride 105 mmol/L      CO2 24.7 mmol/L      Calcium 8.9 mg/dL      Albumin 4.4 g/dL      Phosphorus 0.9 mg/dL      Anion Gap 9.3 mmol/L      BUN/Creatinine Ratio 15.3     eGFR 88.8 mL/min/1.73     Narrative:      GFR Normal >60  Chronic Kidney Disease <60  Kidney Failure <15      aPTT [[id number]]  (Abnormal) Collected: 09/26/24 1129    Specimen: Blood Updated: 09/26/24 1149     PTT 21.6 seconds     Protime-INR [[id number]]  (Abnormal)  Collected: 09/26/24 1129    Specimen: Blood Updated: 09/26/24 1149     Protime 13.3 Seconds      INR 1.24    CBC & Differential [[id number]]  (Abnormal) Collected: 09/26/24 1129    Specimen: Blood Updated: 09/26/24 1139    Narrative:      The following orders were created for panel order CBC & Differential.  Procedure                               Abnormality         Status                     ---------                               -----------         ------                     CBC Auto Differential[[id number]]        Abnormal            Final result                 Please view results for these tests on the individual orders.    CBC Auto Differential [[id number]]  (Abnormal) Collected: 09/26/24 1129    Specimen: Blood Updated: 09/26/24 1139     WBC 5.82 10*3/mm3      RBC 3.40 10*6/mm3      Hemoglobin 9.6 g/dL      Hematocrit 29.1 %      MCV 85.6 fL      MCH 28.2 pg      MCHC 33.0 g/dL      RDW 14.7 %      RDW-SD 46.1 fl      MPV 10.2 fL      Platelets 106 10*3/mm3      Neutrophil % 72.5 %      Lymphocyte % 18.0 %      Monocyte % 8.2 %      Eosinophil % 0.9 %      Basophil % 0.2 %      Immature Grans % 0.2 %      Neutrophils, Absolute 4.22 10*3/mm3      Lymphocytes, Absolute 1.05 10*3/mm3      Monocytes, Absolute 0.48 10*3/mm3      Eosinophils, Absolute 0.05 10*3/mm3      Basophils, Absolute 0.01 10*3/mm3      Immature Grans, Absolute 0.01 10*3/mm3      nRBC 0.0 /100 WBC     Calcium, Ionized [[id number]]  (Abnormal) Collected: 09/26/24 1129    Specimen: Blood Updated: 09/26/24 1137     Ionized Calcium 1.11 mmol/L     Fibrinogen [[id number]]  (Abnormal) Collected: 09/26/24 1037    Specimen: Blood, Arterial Line Updated: 09/26/24 1109     Fibrinogen 124 mg/dL     Protime-INR [[id number]]  (Abnormal) Collected: 09/26/24 1037    Specimen: Blood, Arterial Line Updated: 09/26/24 1109     Protime 13.6 Seconds      INR 1.27    aPTT [[id number]]  (Abnormal) Collected: 09/26/24 1037    Specimen: Blood, Arterial Line Updated:  09/26/24 1109     PTT <20.0 seconds     Platelet Function ADP [[id number]]  (Normal) Collected: 09/26/24 1000    Specimen: Blood, Arterial Line Updated: 09/26/24 1035     ADP Aggregation, % Platelet 95 %     CBC (No Diff) [[id number]]  (Abnormal) Collected: 09/26/24 1000    Specimen: Blood, Arterial Line Updated: 09/26/24 1034     WBC 5.92 10*3/mm3      RBC 3.02 10*6/mm3      Hemoglobin 8.5 g/dL      Hematocrit 26.0 %      MCV 86.1 fL      MCH 28.1 pg      MCHC 32.7 g/dL      RDW 14.7 %      RDW-SD 45.9 fl      MPV 9.9 fL      Platelets 122 10*3/mm3     POC Chem 8, arterial (ISTAT) [[id number]]  (Abnormal) Collected: 09/26/24 1011    Specimen: Arterial Blood Updated: 09/26/24 1019     Ionized Calcium 1.26 mmol/L      pH, Arterial 7.370 pH units      pCO2, Arterial 45.6 mm Hg      pO2, Arterial 127.0 mm Hg      HCO3, Arterial 26.6 mmol/L      Base Excess, Arterial 1.0 mmol/L      Base Deficit --     O2 Saturation, Arterial 99.0 %      Glucose 158 mg/dL      Comment: Serial Number: [id number]:  [id number]        Sodium 139 mmol/L      POC Potassium 4.3 mmol/L      Hematocrit 30 %      Hemoglobin 10.2 g/dL      CO2 Content 28 mmol/L     POC Activated Clotting Time [[id number]]  (Normal) Collected: 09/26/24 1010    Specimen: Blood Updated: 09/26/24 1019     Activated Clotting Time  103 Seconds      Comment: Serial Number: [id number]:  [id number]       POC Chem 8, arterial (ISTAT) [[id number]]  (Abnormal) Collected: 09/26/24 0946    Specimen: Arterial Blood Updated: 09/26/24 1019     Ionized Calcium 1.03 mmol/L      pH, Arterial 7.310 pH units      pCO2, Arterial 49.6 mm Hg      pO2, Arterial 326.0 mm Hg      HCO3, Arterial 25.1 mmol/L      Base Excess, Arterial <0.0 mmol/L      Base Deficit --     O2 Saturation, Arterial 100.0 %      Glucose 177 mg/dL      Comment: Serial Number: [id number]:  [id number]        Sodium 135 mmol/L      POC Potassium 4.2 mmol/L      Hematocrit 33 %      Hemoglobin 11.2 g/dL      CO2 Content 27  mmol/L     POC Activated Clotting Time [[id number]]  (Abnormal) Collected: 09/26/24 0945    Specimen: Blood Updated: 09/26/24 1019     Activated Clotting Time  397 Seconds      Comment: Serial Number: [id number]:  [id number]       POC Activated Clotting Time [[id number]]  (Abnormal) Collected: 09/26/24 0915    Specimen: Arterial Blood Updated: 09/26/24 1019     Activated Clotting Time  360 Seconds      Comment: Serial Number: [id number]:  [id number]       POC Chem 8, arterial (ISTAT) [[id number]]  (Abnormal) Collected: 09/26/24 0848    Specimen: Arterial Blood Updated: 09/26/24 1018     Ionized Calcium 1.03 mmol/L      pH, Arterial 7.240 pH units      pCO2, Arterial 59.9 mm Hg      pO2, Arterial 326.0 mm Hg      HCO3, Arterial 25.5 mmol/L      Base Excess, Arterial <0.0 mmol/L      Base Deficit --     O2 Saturation, Arterial 100.0 %      Glucose 192 mg/dL      Comment: Serial Number: [id number]:  [id number]        Sodium 136 mmol/L      POC Potassium 4.1 mmol/L      Hematocrit 33 %      Hemoglobin 11.2 g/dL      CO2 Content 27 mmol/L     POC Activated Clotting Time [[id number]]  (Abnormal) Collected: 09/26/24 0847    Specimen: Arterial Blood Updated: 09/26/24 1018     Activated Clotting Time  311 Seconds      Comment: Serial Number: [id number]:  [id number]       POC Activated Clotting Time [[id number]]  (Abnormal) Collected: 09/26/24 0834    Specimen: Arterial Blood Updated: 09/26/24 1018     Activated Clotting Time  342 Seconds      Comment: Serial Number: [id number]:  [id number]       POC Chem 8, arterial (ISTAT) [[id number]]  (Abnormal) Collected: 09/26/24 0735    Specimen: Arterial Blood Updated: 09/26/24 1018     Ionized Calcium 1.17 mmol/L      pH, Arterial 7.310 pH units      pCO2, Arterial 52.5 mm Hg      pO2, Arterial 417.0 mm Hg      HCO3, Arterial 26.6 mmol/L      Base Excess, Arterial 0.0 mmol/L      Base Deficit --     O2 Saturation, Arterial 100.0 %      Glucose 266 mg/dL      Comment: Serial Number:  [id number]:  [id number]        Sodium 137 mmol/L      POC Potassium 4.4 mmol/L      Hematocrit 41 %      Hemoglobin 13.9 g/dL      CO2 Content 28 mmol/L     POC Activated Clotting Time [[id number]]  (Normal) Collected: 09/26/24 0734    Specimen: Arterial Blood Updated: 09/26/24 1018     Activated Clotting Time  110 Seconds      Comment: Serial Number: [id number]:  [id number]       POC Chem Panel [[id number]]  (Abnormal) Collected: 09/26/24 0936    Specimen: Venous Blood Updated: 09/26/24 1017     Glucose 185 mg/dL      Comment: Serial Number: [id number]:  [id number]        Sodium 140 mmol/L      POC Potassium 3.9 mmol/L      Ionized Calcium 1.08 mmol/L      Hematocrit 33 %      Hemoglobin 11.2 g/dL      pH, Venous 7.290 pH Units      pCO2, Venous 56.7 mm Hg      CO2 CONTENT  29 mmol/L      HCO3, Venous 27.1 mmol/L      Base Excess, Venous 0.0 mmol/L      Base Deficit --     O2 Saturation, Venous --     pO2, Venous 39.0 mm Hg     POC Chem 8, arterial (ISTAT) [[id number]]  (Abnormal) Collected: 09/26/24 0915    Specimen: Arterial Blood Updated: 09/26/24 1017     Ionized Calcium 1.01 mmol/L      pH, Arterial 7.340 pH units      pCO2, Arterial 51.8 mm Hg      pO2, Arterial 399.0 mm Hg      HCO3, Arterial 28.0 mmol/L      Base Excess, Arterial 2.0 mmol/L      Base Deficit --     O2 Saturation, Arterial 100.0 %      Glucose 193 mg/dL      Comment: Serial Number: [id number]:  [id number]        Sodium 140 mmol/L      POC Potassium 4.2 mmol/L      Hematocrit 34 %      Hemoglobin 11.6 g/dL      CO2 Content 30 mmol/L     POC Glucose Once [[id number]]  (Abnormal) Collected: 09/26/24 0604    Specimen: Blood Updated: 09/26/24 0608     Glucose 282 mg/dL      Comment: Serial Number: [id number]:  [id number]               Imaging Results (Last 24 Hours)       Procedure Component Value Units Date/Time    XR Chest 1 View [[id number]] Collected: 09/26/24 1302     Updated: 09/26/24 1306    Narrative:      XR CHEST 1 VW    Date of Exam:  9/26/2024 12:43 PM EDT    Indication: Post-Op Check Line & Tube Placement    Comparison: 8/24/2024    Findings:  There are postoperative changes of bypass surgery. Endotracheal tube is in place with the tip above the marvel. Nasoenteric tube tip is in the stomach. There is a right IJ line which terminates in the SVC. There is atelectasis in both lung bases. There   are low lung volumes present.      Impression:      Impression:    1. Postoperative chest with low lung volumes and atelectasis in the lung bases.  2. Tubes and lines appear in appropriate position.      Electronically Signed: Prabhu Brush MD    9/26/2024 1:03 PM EDT    Workstation ID: [id number]            EKG      I personally viewed and interpreted the patient's EKG/Telemetry data:    ECHOCARDIOGRAM:  Results for orders placed during the hospital encounter of 08/30/24    Adult Transthoracic Echo Complete W/ Cont if Necessary Per Protocol    Interpretation Summary    Left ventricular ejection fraction appears to be 51 - 55%.    The right ventricular cavity is moderately dilated.       STRESS MYOVIEW:  Results for orders placed during the hospital encounter of 08/20/24    Stress Test With Myocardial Perfusion - One Day    Interpretation Summary    Impressions are consistent with a high risk study.    Left ventricular ejection fraction is mildly reduced (Calculated EF = 43%).    Myocardial perfusion imaging indicates a large-sized, severe area of ischemia located in the inferior wall and lateral wall.       CARDIAC CATHETERIZATION:  Results for orders placed during the hospital encounter of 08/30/24    Cardiac Catheterization/Vascular Study       OTHER:         Assessment & Plan     Coronary artery disease  Patient underwent redo coronary bypass surgery with SVG to the PDA OM and diagonal branches but have also has a LIMA from the previous surgery which is patent to the LAD  Patient is currently intubated and is off sedation  He will be extubated  soon  Patient's chest tubes and Dorsey catheter draining well.  Blood pressure is currently slightly high and hence is on Cardene  Will start him on oral medicines once he is extubated.    Hypertension  Patient's blood pressures better with Cardene but will switch to oral medicines in the morning    Hyperlipidemia  Patient be started on statins.    Diabetes  Patient is on insulin and will be managed by the primary care doctor.    -    I discussed the patients findings and my recommendations with patient's nurse  Ham Jacinto MD  09/26/24  17:26 EDT

## 2024-09-27 ENCOUNTER — APPOINTMENT (OUTPATIENT)
Dept: GENERAL RADIOLOGY | Facility: HOSPITAL | Age: 59
End: 2024-09-27
Payer: COMMERCIAL

## 2024-09-27 LAB
ALBUMIN SERPL-MCNC: 4.5 G/DL (ref 3.5–5.2)
ANION GAP SERPL CALCULATED.3IONS-SCNC: 5.2 MMOL/L (ref 5–15)
ARTERIAL PATENCY WRIST A: ABNORMAL
ATMOSPHERIC PRESS: ABNORMAL MM[HG]
BASE EXCESS BLDA CALC-SCNC: -0.2 MMOL/L (ref 0–3)
BASE EXCESS BLDA CALC-SCNC: 0 MMOL/L (ref 0–3)
BASE EXCESS BLDA CALC-SCNC: 1 MMOL/L (ref 0–3)
BASOPHILS # BLD AUTO: 0.01 10*3/MM3 (ref 0–0.2)
BASOPHILS NFR BLD AUTO: 0.1 % (ref 0–1.5)
BDY SITE: ABNORMAL
BH BB BLOOD EXPIRATION DATE: NORMAL
BH BB BLOOD EXPIRATION DATE: NORMAL
BH BB BLOOD TYPE BARCODE: 6200
BH BB BLOOD TYPE BARCODE: 6200
BH BB DISPENSE STATUS: NORMAL
BH BB DISPENSE STATUS: NORMAL
BH BB PRODUCT CODE: NORMAL
BH BB PRODUCT CODE: NORMAL
BH BB UNIT NUMBER: NORMAL
BH BB UNIT NUMBER: NORMAL
BUN SERPL-MCNC: 13 MG/DL (ref 6–20)
BUN/CREAT SERPL: 14.9 (ref 7–25)
CA-I SERPL ISE-MCNC: 1.01 MMOL/L (ref 1.15–1.3)
CALCIUM SPEC-SCNC: 8.7 MG/DL (ref 8.6–10.5)
CHLORIDE SERPL-SCNC: 113 MMOL/L (ref 98–107)
CO2 BLDA-SCNC: 27.2 MMOL/L (ref 22–29)
CO2 BLDA-SCNC: 27.6 MMOL/L (ref 22–29)
CO2 BLDA-SCNC: 28.1 MMOL/L (ref 22–29)
CO2 SERPL-SCNC: 24.8 MMOL/L (ref 22–29)
CREAT SERPL-MCNC: 0.87 MG/DL (ref 0.76–1.27)
DEPRECATED RDW RBC AUTO: 49.6 FL (ref 37–54)
EGFRCR SERPLBLD CKD-EPI 2021: 99.4 ML/MIN/1.73
EOSINOPHIL # BLD AUTO: 0 10*3/MM3 (ref 0–0.4)
EOSINOPHIL NFR BLD AUTO: 0 % (ref 0.3–6.2)
ERYTHROCYTE [DISTWIDTH] IN BLOOD BY AUTOMATED COUNT: 15.4 % (ref 12.3–15.4)
GLUCOSE BLDC GLUCOMTR-MCNC: 104 MG/DL (ref 70–105)
GLUCOSE BLDC GLUCOMTR-MCNC: 126 MG/DL (ref 70–105)
GLUCOSE BLDC GLUCOMTR-MCNC: 134 MG/DL (ref 70–105)
GLUCOSE BLDC GLUCOMTR-MCNC: 135 MG/DL (ref 70–105)
GLUCOSE BLDC GLUCOMTR-MCNC: 137 MG/DL (ref 70–105)
GLUCOSE BLDC GLUCOMTR-MCNC: 144 MG/DL (ref 70–105)
GLUCOSE BLDC GLUCOMTR-MCNC: 150 MG/DL (ref 70–105)
GLUCOSE BLDC GLUCOMTR-MCNC: 154 MG/DL (ref 70–105)
GLUCOSE BLDC GLUCOMTR-MCNC: 157 MG/DL (ref 74–100)
GLUCOSE BLDC GLUCOMTR-MCNC: 159 MG/DL (ref 70–105)
GLUCOSE BLDC GLUCOMTR-MCNC: 160 MG/DL (ref 70–105)
GLUCOSE BLDC GLUCOMTR-MCNC: 162 MG/DL (ref 70–105)
GLUCOSE BLDC GLUCOMTR-MCNC: 165 MG/DL (ref 70–105)
GLUCOSE BLDC GLUCOMTR-MCNC: 165 MG/DL (ref 70–105)
GLUCOSE BLDC GLUCOMTR-MCNC: 166 MG/DL (ref 70–105)
GLUCOSE BLDC GLUCOMTR-MCNC: 168 MG/DL (ref 70–105)
GLUCOSE BLDC GLUCOMTR-MCNC: 171 MG/DL (ref 70–105)
GLUCOSE BLDC GLUCOMTR-MCNC: 176 MG/DL (ref 70–105)
GLUCOSE BLDC GLUCOMTR-MCNC: 182 MG/DL (ref 70–105)
GLUCOSE BLDC GLUCOMTR-MCNC: 183 MG/DL (ref 70–105)
GLUCOSE BLDC GLUCOMTR-MCNC: 196 MG/DL (ref 70–105)
GLUCOSE BLDC GLUCOMTR-MCNC: 221 MG/DL (ref 70–105)
GLUCOSE BLDC GLUCOMTR-MCNC: 245 MG/DL (ref 70–105)
GLUCOSE SERPL-MCNC: 150 MG/DL (ref 65–99)
HCO3 BLDA-SCNC: 25.9 MMOL/L (ref 21–28)
HCO3 BLDA-SCNC: 26.1 MMOL/L (ref 21–28)
HCO3 BLDA-SCNC: 26.5 MMOL/L (ref 21–28)
HCT VFR BLD AUTO: 26.9 % (ref 37.5–51)
HEMODILUTION: NO
HGB BLD-MCNC: 8.5 G/DL (ref 13–17.7)
IMM GRANULOCYTES # BLD AUTO: 0.02 10*3/MM3 (ref 0–0.05)
IMM GRANULOCYTES NFR BLD AUTO: 0.3 % (ref 0–0.5)
INHALED O2 CONCENTRATION: 36 %
INHALED O2 CONCENTRATION: 36 %
INHALED O2 CONCENTRATION: 40 %
INR PPP: 1.41 (ref 0.93–1.1)
LYMPHOCYTES # BLD AUTO: 0.55 10*3/MM3 (ref 0.7–3.1)
LYMPHOCYTES NFR BLD AUTO: 8.1 % (ref 19.6–45.3)
Lab: ABNORMAL
MAGNESIUM SERPL-MCNC: 1.8 MG/DL (ref 1.6–2.6)
MCH RBC QN AUTO: 27.8 PG (ref 26.6–33)
MCHC RBC AUTO-ENTMCNC: 31.6 G/DL (ref 31.5–35.7)
MCV RBC AUTO: 87.9 FL (ref 79–97)
MODALITY: ABNORMAL
MONOCYTES # BLD AUTO: 0.36 10*3/MM3 (ref 0.1–0.9)
MONOCYTES NFR BLD AUTO: 5.3 % (ref 5–12)
NEUTROPHILS NFR BLD AUTO: 5.81 10*3/MM3 (ref 1.7–7)
NEUTROPHILS NFR BLD AUTO: 86.2 % (ref 42.7–76)
NOTIFIED WHO: ABNORMAL
NRBC BLD AUTO-RTO: 0 /100 WBC (ref 0–0.2)
PCO2 BLDA: 41.8 MM HG (ref 35–48)
PCO2 BLDA: 49.2 MM HG (ref 35–48)
PCO2 BLDA: 51.3 MM HG (ref 35–48)
PH BLDA: 7.32 PH UNITS (ref 7.35–7.45)
PH BLDA: 7.33 PH UNITS (ref 7.35–7.45)
PH BLDA: 7.4 PH UNITS (ref 7.35–7.45)
PHOSPHATE SERPL-MCNC: 4.1 MG/DL (ref 2.5–4.5)
PLATELET # BLD AUTO: 71 10*3/MM3 (ref 140–450)
PMV BLD AUTO: 9.9 FL (ref 6–12)
PO2 BLD: 191 MM[HG] (ref 0–500)
PO2 BLD: 194 MM[HG] (ref 0–500)
PO2 BLD: 98 MM[HG] (ref 0–500)
PO2 BLDA: 35.3 MM HG (ref 83–108)
PO2 BLDA: 68.7 MM HG (ref 83–108)
PO2 BLDA: 77.4 MM HG (ref 83–108)
POTASSIUM SERPL-SCNC: 4.3 MMOL/L (ref 3.5–5.2)
PROTHROMBIN TIME: 15 SECONDS (ref 9.6–11.7)
QT INTERVAL: 369 MS
QT INTERVAL: 398 MS
QTC INTERVAL: 392 MS
QTC INTERVAL: 451 MS
RBC # BLD AUTO: 3.06 10*6/MM3 (ref 4.14–5.8)
READ BACK: YES
SAO2 % BLDCOA: 62.1 % (ref 94–98)
SAO2 % BLDCOA: 91.9 % (ref 94–98)
SAO2 % BLDCOA: 95.3 % (ref 94–98)
SODIUM SERPL-SCNC: 143 MMOL/L (ref 136–145)
UNIT  ABO: NORMAL
UNIT  ABO: NORMAL
UNIT  RH: NORMAL
UNIT  RH: NORMAL
WBC NRBC COR # BLD AUTO: 6.75 10*3/MM3 (ref 3.4–10.8)

## 2024-09-27 PROCEDURE — 25010000002 MAGNESIUM SULFATE IN D5W 1G/100ML (PREMIX) 1-5 GM/100ML-% SOLUTION: Performed by: NURSE PRACTITIONER

## 2024-09-27 PROCEDURE — 82948 REAGENT STRIP/BLOOD GLUCOSE: CPT

## 2024-09-27 PROCEDURE — 99024 POSTOP FOLLOW-UP VISIT: CPT | Performed by: NURSE PRACTITIONER

## 2024-09-27 PROCEDURE — 82803 BLOOD GASES ANY COMBINATION: CPT

## 2024-09-27 PROCEDURE — 97166 OT EVAL MOD COMPLEX 45 MIN: CPT

## 2024-09-27 PROCEDURE — 25010000002 BUMETANIDE PER 0.5 MG: Performed by: NURSE PRACTITIONER

## 2024-09-27 PROCEDURE — 25010000002 ACETAMINOPHEN 10 MG/ML SOLUTION: Performed by: NURSE PRACTITIONER

## 2024-09-27 PROCEDURE — 93010 ELECTROCARDIOGRAM REPORT: CPT | Performed by: INTERNAL MEDICINE

## 2024-09-27 PROCEDURE — 82330 ASSAY OF CALCIUM: CPT | Performed by: NURSE PRACTITIONER

## 2024-09-27 PROCEDURE — 25010000002 CEFAZOLIN PER 500 MG: Performed by: NURSE PRACTITIONER

## 2024-09-27 PROCEDURE — 25010000002 NICARDIPINE 2.5 MG/ML SOLUTION 10 ML VIAL: Performed by: NURSE PRACTITIONER

## 2024-09-27 PROCEDURE — 25010000002 CALCIUM GLUCONATE 2-0.675 GM/100ML-% SOLUTION: Performed by: NURSE PRACTITIONER

## 2024-09-27 PROCEDURE — 99232 SBSQ HOSP IP/OBS MODERATE 35: CPT | Performed by: INTERNAL MEDICINE

## 2024-09-27 PROCEDURE — 71045 X-RAY EXAM CHEST 1 VIEW: CPT

## 2024-09-27 PROCEDURE — 85610 PROTHROMBIN TIME: CPT | Performed by: NURSE PRACTITIONER

## 2024-09-27 PROCEDURE — 99222 1ST HOSP IP/OBS MODERATE 55: CPT | Performed by: INTERNAL MEDICINE

## 2024-09-27 PROCEDURE — 85025 COMPLETE CBC W/AUTO DIFF WBC: CPT | Performed by: NURSE PRACTITIONER

## 2024-09-27 PROCEDURE — 83735 ASSAY OF MAGNESIUM: CPT | Performed by: NURSE PRACTITIONER

## 2024-09-27 PROCEDURE — 25010000002 MORPHINE PER 10 MG: Performed by: NURSE PRACTITIONER

## 2024-09-27 PROCEDURE — 93005 ELECTROCARDIOGRAM TRACING: CPT | Performed by: NURSE PRACTITIONER

## 2024-09-27 PROCEDURE — 80069 RENAL FUNCTION PANEL: CPT | Performed by: NURSE PRACTITIONER

## 2024-09-27 PROCEDURE — 25810000003 SODIUM CHLORIDE 0.9 % SOLUTION 250 ML FLEX CONT: Performed by: NURSE PRACTITIONER

## 2024-09-27 PROCEDURE — 97162 PT EVAL MOD COMPLEX 30 MIN: CPT

## 2024-09-27 RX ORDER — CALCIUM GLUCONATE 20 MG/ML
2000 INJECTION, SOLUTION INTRAVENOUS ONCE
Status: COMPLETED | OUTPATIENT
Start: 2024-09-27 | End: 2024-09-27

## 2024-09-27 RX ORDER — ENOXAPARIN SODIUM 100 MG/ML
40 INJECTION SUBCUTANEOUS DAILY
Status: DISCONTINUED | OUTPATIENT
Start: 2024-09-28 | End: 2024-09-28

## 2024-09-27 RX ORDER — POTASSIUM CHLORIDE 1500 MG/1
20 TABLET, EXTENDED RELEASE ORAL ONCE
Status: COMPLETED | OUTPATIENT
Start: 2024-09-27 | End: 2024-09-27

## 2024-09-27 RX ORDER — METOPROLOL TARTRATE 25 MG/1
25 TABLET, FILM COATED ORAL EVERY 12 HOURS SCHEDULED
Status: DISCONTINUED | OUTPATIENT
Start: 2024-09-27 | End: 2024-09-28

## 2024-09-27 RX ORDER — FENOFIBRATE 145 MG/1
145 TABLET, COATED ORAL DAILY
Status: DISCONTINUED | OUTPATIENT
Start: 2024-09-27 | End: 2024-10-02 | Stop reason: HOSPADM

## 2024-09-27 RX ORDER — BUMETANIDE 0.25 MG/ML
1 INJECTION INTRAMUSCULAR; INTRAVENOUS ONCE
Status: COMPLETED | OUTPATIENT
Start: 2024-09-27 | End: 2024-09-27

## 2024-09-27 RX ADMIN — MAGNESIUM SULFATE IN DEXTROSE 1 G: 10 INJECTION, SOLUTION INTRAVENOUS at 10:53

## 2024-09-27 RX ADMIN — BUMETANIDE 1 MG: 0.25 INJECTION INTRAMUSCULAR; INTRAVENOUS at 10:52

## 2024-09-27 RX ADMIN — METOPROLOL TARTRATE 25 MG: 25 TABLET, FILM COATED ORAL at 20:53

## 2024-09-27 RX ADMIN — HYDROCODONE BITARTRATE AND ACETAMINOPHEN 1 TABLET: 5; 325 TABLET ORAL at 21:52

## 2024-09-27 RX ADMIN — NICARDIPINE HYDROCHLORIDE 7.5 MG/HR: 25 INJECTION, SOLUTION INTRAVENOUS at 02:58

## 2024-09-27 RX ADMIN — HYDROCODONE BITARTRATE AND ACETAMINOPHEN 1 TABLET: 5; 325 TABLET ORAL at 12:23

## 2024-09-27 RX ADMIN — METOPROLOL TARTRATE 25 MG: 25 TABLET, FILM COATED ORAL at 10:53

## 2024-09-27 RX ADMIN — HYDROCODONE BITARTRATE AND ACETAMINOPHEN 1 TABLET: 5; 325 TABLET ORAL at 17:55

## 2024-09-27 RX ADMIN — SENNOSIDES AND DOCUSATE SODIUM 2 TABLET: 50; 8.6 TABLET ORAL at 20:53

## 2024-09-27 RX ADMIN — HYDROCODONE BITARTRATE AND ACETAMINOPHEN 1 TABLET: 5; 325 TABLET ORAL at 04:06

## 2024-09-27 RX ADMIN — SODIUM CHLORIDE 2000 MG: 900 INJECTION INTRAVENOUS at 01:25

## 2024-09-27 RX ADMIN — MUPIROCIN 1 APPLICATION: 20 OINTMENT TOPICAL at 08:22

## 2024-09-27 RX ADMIN — POLYETHYLENE GLYCOL 3350 17 G: 17 POWDER, FOR SOLUTION ORAL at 08:22

## 2024-09-27 RX ADMIN — MORPHINE SULFATE 2 MG: 2 INJECTION, SOLUTION INTRAMUSCULAR; INTRAVENOUS at 02:15

## 2024-09-27 RX ADMIN — CHLORHEXIDINE GLUCONATE, 0.12% ORAL RINSE 15 ML: 1.2 SOLUTION DENTAL at 08:22

## 2024-09-27 RX ADMIN — CYCLOBENZAPRINE 5 MG: 10 TABLET, FILM COATED ORAL at 13:31

## 2024-09-27 RX ADMIN — ACETAMINOPHEN 1000 MG: 10 INJECTION INTRAVENOUS at 01:06

## 2024-09-27 RX ADMIN — SODIUM CHLORIDE 2000 MG: 900 INJECTION INTRAVENOUS at 10:53

## 2024-09-27 RX ADMIN — PANTOPRAZOLE SODIUM 40 MG: 40 TABLET, DELAYED RELEASE ORAL at 05:48

## 2024-09-27 RX ADMIN — FENOFIBRATE 145 MG: 145 TABLET ORAL at 13:31

## 2024-09-27 RX ADMIN — MORPHINE SULFATE 2 MG: 2 INJECTION, SOLUTION INTRAMUSCULAR; INTRAVENOUS at 04:21

## 2024-09-27 RX ADMIN — ASPIRIN 81 MG: 81 TABLET, COATED ORAL at 08:22

## 2024-09-27 RX ADMIN — CALCIUM GLUCONATE 2000 MG: 20 INJECTION, SOLUTION INTRAVENOUS at 10:53

## 2024-09-27 RX ADMIN — SENNOSIDES AND DOCUSATE SODIUM 2 TABLET: 50; 8.6 TABLET ORAL at 08:22

## 2024-09-27 RX ADMIN — MUPIROCIN 1 APPLICATION: 20 OINTMENT TOPICAL at 20:54

## 2024-09-27 RX ADMIN — POTASSIUM CHLORIDE 20 MEQ: 1500 TABLET, EXTENDED RELEASE ORAL at 10:53

## 2024-09-27 RX ADMIN — NICARDIPINE HYDROCHLORIDE 5 MG/HR: 25 INJECTION, SOLUTION INTRAVENOUS at 06:46

## 2024-09-27 RX ADMIN — ROSUVASTATIN 40 MG: 10 TABLET, FILM COATED ORAL at 20:53

## 2024-09-27 RX ADMIN — CHLORHEXIDINE GLUCONATE, 0.12% ORAL RINSE 15 ML: 1.2 SOLUTION DENTAL at 20:54

## 2024-09-27 RX ADMIN — SODIUM CHLORIDE 2000 MG: 900 INJECTION INTRAVENOUS at 17:55

## 2024-09-27 RX ADMIN — POLYETHYLENE GLYCOL 3350 17 G: 17 POWDER, FOR SOLUTION ORAL at 20:53

## 2024-09-27 RX ADMIN — HYDROCODONE BITARTRATE AND ACETAMINOPHEN 1 TABLET: 5; 325 TABLET ORAL at 08:22

## 2024-09-27 RX ADMIN — MAGNESIUM SULFATE IN DEXTROSE 1 G: 10 INJECTION, SOLUTION INTRAVENOUS at 01:06

## 2024-09-27 RX ADMIN — INSULIN HUMAN 1.9 UNITS/HR: 1 INJECTION, SOLUTION INTRAVENOUS at 00:02

## 2024-09-27 NOTE — PLAN OF CARE
Goal Outcome Evaluation:  Plan of Care Reviewed With: patient, spouse           Outcome Evaluation: Rodney Chaney is a 59 y.o. male with progressive CAD with hx of CABG, HTN, HLD, and DM. Pt underwent redo CABG by Dr. Mares on 9/26 and is seen by PT on POD #1.  Pt lives with his spouse in a [facility] with 10 LAUREN and handrail.  At baseline he is typically independent with all mobility & ADLs without AD. 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 His wife also works but plans to be home with him for several days when he first gets home.  At time of PT evaluation, he is A&O x 4 with moderate chest pain.  He requires min A for transfers and ambulates 100' with RW and min A & 2nd person for line management.  He is currently requiring 5L to maintain.  He was educated on sternal precautions and verbalized understanding.  If he progresses as anticipated, he should not require PT after discharge and should be safe to return home with assist from spouse.      Anticipated Discharge Disposition (PT): home with assist

## 2024-09-27 NOTE — OP NOTE
Operative Note    Date of Dictation: 09/27/24    Date of Procedure: 9/26/2024    Referring Physician: Ham Jacinto MD    Preoperative diagnosis:   1.  Multivessel coronary artery disease  2.  Status post CABG with recurrent coronary disease  3.  Progressive angina  4.  Abnormal nuclear stress test and LV dysfunction    Postoperative diagnosis:   Same    Procedure:   1.  Redo sternotomy with extensive lysis of adhesions   2.  CABG x 3 with reverse Individual saphenous vein graft to the obtuse marginal 1 and 2 and to the PDA  3.  EVH of the left leg    Surgeon: Osmany Mares MD     Assistants: Assistant: Gabrielle Adams PA-C was responsible for performing the following activities: Retraction, Suction, Irrigation, Suturing, Closing, Placing Dressing, Harvesting of Vessels, Bypass Grafting, and all aspects of the complex cardiac case  and their skilled assistance was necessary for the success of this case.    Anesthesia: General endotracheal anesthesia and EDER    Findings:  The saphenous vein was harvested endoscopically form the left  leg. The vein had a diameter of 3.5 mm and was of good quality. The coronaries diameters of 1.75 and 2 mm.    Estimated Blood Loss: Approximately 400 cc, most of it recovered with a Cell Saver device and cardiotomy suckers and was retransfuse to the patient    STS Data:    The patient was explained the risks (STS risk score calculated), benefits and alternatives of surgery and agreed to proceed. The antibiotics and b blockers were given in the STS required window.  Counseling was given about diet, alcohol and tobacco use as needed        Description of the procedure:     The patient was placed supine on the operative table. General anesthesia was given and lines placed. The patient was prepped and draped using the usual sterile technique. A median sternotomy incision was performed with a scalpel in line of the prior incision and the layers carried down to the sternum using the  electrocautery.  Wires were cut and removed.  The sternum was split in the midline using a transverse oscillating saw. Hemostasis was achieved.  The vein was harvested endoscopically had good quality.. It was of good quality. 300 units/kg of IV heparin was given to an ACT over 400.  Dissection was performed under each sternal table and the retractor was placed and the mediastinum was exposed.  Took approximately 1 hour of dissection to obtain cannulation sites in the distal ascending aorta and right atrium.  A Favaloro retractor was placed again and the mediastinum exposed, the pericardium was opened and the edges tacked to the wound. Cannulation sutures were placed in the ascending aorta and right atrium. Small cannulas were placed and aorto right atrial cardiopulmonary bypass was started. Cardioplegia cannulas were placed and then the ascending aorta was clamped. One liter of cold blood cardioplegia was given in an antegrade fashion to achieved diastolic arrest and further doses every 15 minutes thereafter.  Dissection was performed and a bulldog was placed in the JESU pedicle.  Constructions of grafts were done in the sequence distal - proximal between the reversed saphenous vein graft and each coronary targets. Grafts were constructed to the PDA, OM1, and OM2 coronary arteries and plegia was given between graft sequences after de-airing the aortic root and tying the proximal anastomosis.  All anastomoses were checked and had good flow and morphology.  Patient was systemically rewarmed, the warm dose of cardioplegia was given and then the aortic was clamped and bulldog clamp were removed with tip suction on the aortic vent.  The left pleural space was suctioned and the lungs ventilated. The heart was paced till regular atrial rhythm resumed. I allowed the heart to eject and once hemodynamics were acceptable, then the CPB was discontinued and the venous and cardioplegia cannulas removed. The matching dose of  protamine was given and the aortic cannula removed as well. AV temporary wires and pleural and mediastinal chest tubes were placed and the wound sprayed with platelet rich plasma.  The sternum was closed with single and double wires and soft tissue in layers of reabsorbable material. The wounds were covered with sterile dressings.       Specimen removed:  none    CPB time: 79 minutes    Aortic clamp time: 62 minutes    Complications:  none           Disposition: Cardiovascular recovery room           Condition: Critical but stable.

## 2024-09-27 NOTE — THERAPY EVALUATION
Patient Name: Rodney Chaney  : 1965    MRN: [id number]                              Today's Date: 2024       Admit Date: 2024    Visit Dx:     ICD-10-CM ICD-9-CM   1. Coronary artery disease of bypass graft of native heart with stable angina pectoris  I25.708 414.05     413.9     Patient Active Problem List   Diagnosis    Status post coronary artery stent placement    Coronary angioplasty status    Coronary artery disease    Diabetes mellitus    Diabetic nephropathy associated with type 2 diabetes mellitus    Dyspnea on exertion    Mixed hyperlipidemia    Hypertension    Hypocalcemia    Presence of aortocoronary bypass graft    Type 2 diabetes mellitus with hyperglycemia, without long-term current use of insulin    Vitamin D deficiency    Pure hypertriglyceridemia    Type 2 diabetes mellitus without complication    Essential (primary) hypertension    Diabetes mellitus without complication    Nuclear sclerosis    Angina at rest    Abnormal nuclear stress test    Coronary artery disease of bypass graft of native heart with stable angina pectoris     Past Medical History:   Diagnosis Date    CAD (coronary artery disease)     Hyperlipidemia     Hypertension     Type 2 diabetes mellitus      Past Surgical History:   Procedure Laterality Date    APPENDECTOMY      CARDIAC CATHETERIZATION N/A 2024    Procedure: Left Heart Cath with angiogram;  Surgeon: Ham Jacinto MD;  Location: [facility] CATH INVASIVE LOCATION;  Service: Cardiovascular;  Laterality: N/A;    CARDIAC CATHETERIZATION Right 2024    Procedure: Coronary angiography;  Surgeon: Ham Jacinto MD;  Location: [facility] CATH INVASIVE LOCATION;  Service: Cardiovascular;  Laterality: Right;    CARDIAC CATHETERIZATION N/A 2024    Procedure: Left ventriculography;  Surgeon: Ham Jacinto MD;  Location: [facility] CATH INVASIVE LOCATION;  Service: Cardiovascular;  Laterality: N/A;    CARDIAC CATHETERIZATION  2024    Procedure: Saphenous  Vein Graft;  Surgeon: Ham Jacinto MD;  Location: [facility] CATH INVASIVE LOCATION;  Service: Cardiovascular;;    CHOLECYSTECTOMY      CORONARY ARTERY BYPASS GRAFT      2010      General Information       Row Name 09/27/24 1540 09/27/24 1510       OT Time and Intention    Document Type evaluation  -SR evaluation  -SR    Mode of Treatment occupational therapy  -SR occupational therapy  -SR      Row Name 09/27/24 1510          General Information    Existing Precautions/Restrictions sternal  -SR       Row Name 09/27/24 1540          Occupational Profile    Reason for Services/Referral (Occupational Profile) Rodney Chaney is a 59 y.o. male with progressive CAD with hx of CABG, HTN, HLD, and DM. Pt underwent redo CABG by Dr. Mares on 9/26 and is seen by PT on POD #1.  Pt lives with his spouse in a H with [address] and handrail.  At baseline he is typically independent with all mobility & ADLs without AD. 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 His wife also works but plans to be home with him for several days when he first gets home.  -SR       Row Name 09/27/24 1540          Living Environment    People in Home spouse  -SR       Row Name 09/27/24 1540          Cognition    Orientation Status (Cognition) oriented x 4  -SR               User Key  (r) = Recorded By, (t) = Taken By, (c) = Cosigned By      Initials Name Provider Type    SR Etelvina Zazueta OT Occupational Therapist                     Mobility/ADL's       Row Name 09/27/24 1549          Sit-Stand Transfer    Sit-Stand [place] (Transfers) minimum assist (75% patient effort)  -SR       Row Name 09/27/24 1549          Functional Mobility    Functional Mobility- Ind. Level minimum assist (75% patient effort)  -SR     Functional Mobility- Device walker, front-wheeled  -SR               User Key  (r) = Recorded By, (t) = Taken By, (c) = Cosigned By      Initials Name Provider Type    Etelvina Rosas OT Occupational  Therapist                   Obj/Interventions       Row Name 09/27/24 1556          Range of Motion Comprehensive    Comment, General Range of Motion Within restricted limits  -SR       Row Name 09/27/24 1556          Strength Comprehensive (MMT)    General Manual Muscle Testing (MMT) Assessment no strength deficits identified  -SR       [facility] Name 09/27/24 1556          Balance    Dynamic Standing Balance contact guard  -SR     Position/Device Used, Standing Balance walker, front-wheeled  -SR     Balance Interventions sitting;standing;sit to stand;supported;static;dynamic;minimal challenge  -SR               User Key  (r) = Recorded By, (t) = Taken By, (c) = Cosigned By      Initials Name Provider Type    SR Etelvina Zazueta, OT Occupational Therapist                   Goals/Plan       Row Name 09/27/24 1611          Bathing Goal 1 (OT)    Activity/Device (Bathing Goal 1, OT) bathing skills, all  -SR     [place] Level/Cues Needed (Bathing Goal 1, OT) modified independence  -SR     Time Frame (Bathing Goal 1, OT) 2 weeks  -SR       [facility] Name 09/27/24 1611          Toileting Goal 1 (OT)    Activity/Device (Toileting Goal 1, OT) toileting skills, all  -SR     [place] Level/Cues Needed (Toileting Goal 1, OT) modified independence  -SR     Time Frame (Toileting Goal 1, OT) 2 weeks  -SR       [facility] Name 09/27/24 1611          Therapy Assessment/Plan (OT)    Planned Therapy Interventions (OT) activity tolerance training;BADL retraining;IADL retraining;functional balance retraining;neuromuscular control/coordination retraining;ROM/therapeutic exercise;transfer/mobility retraining;strengthening exercise;occupation/activity based interventions  -SR               User Key  (r) = Recorded By, (t) = Taken By, (c) = Cosigned By      Initials Name Provider Type    SR Etelvina Zazueta, OT Occupational Therapist                   Clinical Impression       Row Name 09/27/24 1609          Pain Assessment     Pretreatment Pain Rating 6/10  -SR     Posttreatment Pain Rating 6/10  -SR     Pain Location incisional  -SR       Row Name 09/27/24 1607          Plan of Care Review    Outcome Evaluation Rodney Chaney is a 59 y.o. male with progressive CAD with hx of CABG, HTN, HLD, and DM. Pt underwent redo CABG by Dr. Mares on 9/26 and is seen by PT on POD #1.  Pt lives with his spouse in a H with [address] and handrail.  At baseline he is typically independent with all mobility & ADLs without AD. 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 His wife also works but plans to be home with him for several days when he first gets home.  This date pt tolerates mobility well and is receptive of sternal precaution education.  Anticipate he will progress well with mobility and ADLs prior to discharge and anticipate he will be safe to return home with wife.  -SR       Row Name 09/27/24 1607          Therapy Assessment/Plan (OT)    Rehab Potential (OT) good, to achieve stated therapy goals  -SR     Criteria for Skilled Therapeutic Interventions Met (OT) yes  -SR     Therapy Frequency (OT) 3 times/wk  -SR     Predicted Duration of Therapy Intervention (OT) Until discharge  -SR       Row Name 09/27/24 1607          Therapy Plan Review/Discharge Plan (OT)    Anticipated Discharge Disposition (OT) home with assist  -SR       Row Name 09/27/24 1607          Positioning and Restraints    Pre-Treatment Position in bed  -SR     Post Treatment Position chair  -SR     In Chair call light within reach;encouraged to call for assist;exit alarm on  -SR               User Key  (r) = Recorded By, (t) = Taken By, (c) = Cosigned By      Initials Name Provider Type    SR Etelvina Zazueta, OT Occupational Therapist                   Outcome Measures       Row Name 09/27/24 1239 09/27/24 0800       How much help from another person do you currently need...    Turning from your back to your side while in flat bed without using  bedrails? 3  -CL 3  -SD    Moving from lying on back to sitting on the side of a flat bed without bedrails? 3  -CL 3  -SD    Moving to and from a bed to a chair (including a wheelchair)? 3  -CL 3  -SD    Standing up from a chair using your arms (e.g., wheelchair, bedside chair)? 3  -CL 3  -SD    Climbing 3-5 steps with a railing? 2  -CL 3  -SD    To walk in hospital room? 3  -CL 3  -SD    AM-PAC 6 Clicks Score (PT) 17  -CL 18  -SD    Highest Level of Mobility Goal 5 --> Static standing  -CL 6 --> Walk 10 steps or more  -SD              User Key  (r) = Recorded By, (t) = Taken By, (c) = Cosigned By      Initials Name Provider Type    CL Meghan Collier, PT Physical Therapist    Marcy Candelaria, RN Registered Nurse                    Occupational Therapy Education       Title: PT OT SLP Therapies (In Progress)       Topic: Occupational Therapy (In Progress)       Point: ADL training (Done)       Description:   Instruct learner(s) on proper safety adaptation and remediation techniques during self care or transfers.   Instruct in proper use of assistive devices.                  Learning Progress Summary             Patient Acceptance, E,TB, VU by SR at 9/27/2024 1611                         Point: Home exercise program (Not Started)       Description:   Instruct learner(s) on appropriate technique for monitoring, assisting and/or progressing therapeutic exercises/activities.                  Learner Progress:  Not documented in this visit.              Point: Precautions (Done)       Description:   Instruct learner(s) on prescribed precautions during self-care and functional transfers.                  Learning Progress Summary             Patient Acceptance, E,TB, VU by SR at 9/27/2024 1611                         Point: Body mechanics (Not Started)       Description:   Instruct learner(s) on proper positioning and spine alignment during self-care, functional mobility activities and/or exercises.                   Learner Progress:  Not documented in this visit.                              User Key       Initials Effective Dates Name Provider Type Discipline    SR 06/16/21 -  Etelvina Zazueta OT Occupational Therapist OT                  OT Recommendation and Plan  Planned Therapy Interventions (OT): activity tolerance training, BADL retraining, IADL retraining, functional balance retraining, neuromuscular control/coordination retraining, ROM/therapeutic exercise, transfer/mobility retraining, strengthening exercise, occupation/activity based interventions  Therapy Frequency (OT): 3 times/wk  Plan of Care Review  Outcome Evaluation: Rodney Chaney is a 59 y.o. male with progressive CAD with hx of CABG, HTN, HLD, and DM. Pt underwent redo CABG by Dr. Mares on 9/26 and is seen by PT on POD #1.  Pt lives with his spouse in a [facility] with [address] and handrail.  At baseline he is typically independent with all mobility & ADLs without AD. 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 His wife also works but plans to be home with him for several days when he first gets home.  This date pt tolerates mobility well and is receptive of sternal precaution education.  Anticipate he will progress well with mobility and ADLs prior to discharge and anticipate he will be safe to return home with wife.     Time Calculation:         Time Calculation- OT       Row Name 09/27/24 1611 09/27/24 1509          Time Calculation- OT    OT Start Time 1110  -SR 1110  -SR     OT Stop Time 1132  -SR 1132  -SR     OT Time Calculation (min) 22 min  -SR 22 min  -SR     Total Timed Code Minutes- OT 0 minute(s)  -SR 0 minute(s)  -SR     OT Received On 09/27/24  -SR 09/27/24  -SR     OT - Next Appointment 09/30/24  -SR 09/30/24  -SR     OT Goal Re-Cert Due Date 10/11/24  -SR 10/11/24  -SR               User Key  (r) = Recorded By, (t) = Taken By, (c) = Cosigned By      Initials Name Provider Type    SR Etelvina Zazueta OT  Occupational Therapist                  Therapy Charges for Today       Code Description Service Date Service Provider Modifiers Qty    96012136411 HC OT EVAL MOD COMPLEXITY 4 9/27/2024 Etelvina Zazueta, OT GO 1                 Etelvina Zazueta, OT  9/27/2024

## 2024-09-27 NOTE — PLAN OF CARE
Goal Outcome Evaluation:              Outcome Evaluation: Rodney Chaney is a 59 y.o. male with progressive CAD with hx of CABG, HTN, HLD, and DM. Pt underwent redo CABG by Dr. Mares on 9/26 and is seen by PT on POD #1.  Pt lives with his spouse in a [facility] with [address] and handrail.  At baseline he is typically independent with all mobility & ADLs without AD. 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 His wife also works but plans to be home with him for several days when he first gets home.  This date pt tolerates mobility well and is receptive of sternal precaution education.  Anticipate he will progress well with mobility and ADLs prior to discharge and anticipate he will be safe to return home with wife.      Anticipated Discharge Disposition (OT): home with assist

## 2024-09-27 NOTE — THERAPY EVALUATION
Patient Name: Rodney Chaney  : 1965    MRN: [id number]                              Today's Date: 2024       Admit Date: 2024    Visit Dx:     ICD-10-CM ICD-9-CM   1. Coronary artery disease of bypass graft of native heart with stable angina pectoris  I25.708 414.05     413.9     Patient Active Problem List   Diagnosis    Status post coronary artery stent placement    Coronary angioplasty status    Coronary artery disease    Diabetes mellitus    Diabetic nephropathy associated with type 2 diabetes mellitus    Dyspnea on exertion    Mixed hyperlipidemia    Hypertension    Hypocalcemia    Presence of aortocoronary bypass graft    Type 2 diabetes mellitus with hyperglycemia, without long-term current use of insulin    Vitamin D deficiency    Pure hypertriglyceridemia    Type 2 diabetes mellitus without complication    Essential (primary) hypertension    Diabetes mellitus without complication    Nuclear sclerosis    Angina at rest    Abnormal nuclear stress test    Coronary artery disease of bypass graft of native heart with stable angina pectoris     Past Medical History:   Diagnosis Date    CAD (coronary artery disease)     Hyperlipidemia     Hypertension     Type 2 diabetes mellitus      Past Surgical History:   Procedure Laterality Date    APPENDECTOMY      CARDIAC CATHETERIZATION N/A 2024    Procedure: Left Heart Cath with angiogram;  Surgeon: Ham Jacinto MD;  Location: [facility] CATH INVASIVE LOCATION;  Service: Cardiovascular;  Laterality: N/A;    CARDIAC CATHETERIZATION Right 2024    Procedure: Coronary angiography;  Surgeon: Ham Jacinto MD;  Location: [facility] CATH INVASIVE LOCATION;  Service: Cardiovascular;  Laterality: Right;    CARDIAC CATHETERIZATION N/A 2024    Procedure: Left ventriculography;  Surgeon: Ham Jacinto MD;  Location: [facility] CATH INVASIVE LOCATION;  Service: Cardiovascular;  Laterality: N/A;    CARDIAC CATHETERIZATION  2024    Procedure: Saphenous  Vein Graft;  Surgeon: Ham Jacinto MD;  Location: [facility] CATH INVASIVE LOCATION;  Service: Cardiovascular;;    CHOLECYSTECTOMY      CORONARY ARTERY BYPASS GRAFT      2010      General Information       Row Name 09/27/24 1227          Physical Therapy Time and Intention    Document Type evaluation  -CL     Mode of Treatment physical therapy  -CL       Row Name 09/27/24 1227          General Information    Patient Profile Reviewed yes  -CL     Prior Level of Function independent:;driving;ADL's;community mobility;work  works as a meal   -CL     Existing Precautions/Restrictions sternal;hip  -CL     Barriers to Rehab none identified  -CL       Row Name 09/27/24 1227          Living Environment    People in Home spouse  -CL     Name(s) of People in Home Wife: Tomeka  -CL       Row Name 09/27/24 1227          Stairs Within Home, Primary    Number of Stairs, Within Home, Primary ten  -CL     Stair Railings, Within Home, Primary railings safe and in good condition  -CL       Row Name 09/27/24 1227          Cognition    Orientation Status (Cognition) oriented x 4  -CL               User Key  (r) = Recorded By, (t) = Taken By, (c) = Cosigned By      Initials Name Provider Type    CL Meghan Collier, PT Physical Therapist                   Mobility       Row Name 09/27/24 1228          Bed Mobility    Comment, (Bed Mobility) Pt up in chair at start and end of session  -CL       Row Name 09/27/24 1228          Sit-Stand Transfer    Sit-Stand [place] (Transfers) minimum assist (75% patient effort)  -CL       Row Name 09/27/24 1228          Gait/Stairs (Locomotion)    [place] Level (Gait) minimum assist (75% patient effort);1 person to manage equipment  -CL     Assistive Device (Gait) walker, front-wheeled  -CL     Patient was able to Ambulate yes  -CL     Distance in Feet (Gait) 100  -CL               User Key  (r) = Recorded By, (t) = Taken By, (c) = Cosigned By      Initials Name Provider Type     CL Meghan Collier, PT Physical Therapist                   Obj/Interventions       Row Name 09/27/24 1233          Range of Motion Comprehensive    General Range of Motion no range of motion deficits identified  -CL       Row Name 09/27/24 1233          Strength Comprehensive (MMT)    General Manual Muscle Testing (MMT) Assessment no strength deficits identified  -CL       Row Name 09/27/24 1233          Balance    Balance Assessment sitting static balance;sitting dynamic balance;standing static balance;standing dynamic balance  -CL     Static Sitting Balance independent  -CL     Dynamic Sitting Balance contact guard  -CL     Position, Sitting Balance sitting in chair  -CL     Static Standing Balance contact guard  -CL     Dynamic Standing Balance contact guard  -CL     Position/Device Used, Standing Balance supported;walker, rolling  -CL       Row Name 09/27/24 1233          Sensory Assessment (Somatosensory)    Sensory Assessment (Somatosensory) sensation intact  -CL               User Key  (r) = Recorded By, (t) = Taken By, (c) = Cosigned By      Initials Name Provider Type    CL Meghan Collier, PT Physical Therapist                   Goals/Plan       Row Name 09/27/24 1239          Bed Mobility Goal 1 (PT)    Activity/Assistive Device (Bed Mobility Goal 1, PT) bed mobility activities, all  -CL     [place] Level/Cues Needed (Bed Mobility Goal 1, PT) modified independence  -CL     Time Frame (Bed Mobility Goal 1, PT) long term goal (LTG);2 weeks  -CL       Row Name 09/27/24 1239          Transfer Goal 1 (PT)    Activity/Assistive Device (Transfer Goal 1, PT) sit-to-stand/stand-to-sit;bed-to-chair/chair-to-bed  -CL     [place] Level/Cues Needed (Transfer Goal 1, PT) modified independence  -CL     Time Frame (Transfer Goal 1, PT) long term goal (LTG);2 weeks  -CL       Row Name 09/27/24 1239          Gait Training Goal 1 (PT)    Activity/Assistive Device (Gait Training Goal 1, PT) gait (walking  locomotion);assistive device use  -CL     [place] Level (Gait Training Goal 1, PT) modified independence  -CL     Distance (Gait Training Goal 1, PT) 400'  -CL     Time Frame (Gait Training Goal 1, PT) long term goal (LTG);2 weeks  -CL       Row Name 09/27/24 1239          Stairs Goal 1 (PT)    Activity/Assistive Device (Stairs Goal 1, PT) ascending stairs;descending stairs  -CL     [place] Level/Cues Needed (Stairs Goal 1, PT) modified independence  -CL     Number of Stairs (Stairs Goal 1, PT) 10  -CL     Time Frame (Stairs Goal 1, PT) long term goal (LTG);2 weeks  -CL       Row Name 09/27/24 1239          Therapy Assessment/Plan (PT)    Planned Therapy Interventions (PT) balance training;bed mobility training;gait training;home exercise program;patient/family education;strengthening;stair training;transfer training  -CL               User Key  (r) = Recorded By, (t) = Taken By, (c) = Cosigned By      Initials Name Provider Type    Meghan Gabriel, PT Physical Therapist                   Clinical Impression       Row Name 09/27/24 1233          Pain    Pretreatment Pain Rating 6/10  -CL     Posttreatment Pain Rating 6/10  -CL     Pain Location incisional  -CL     Pain Intervention(s) Repositioned;Ambulation/increased activity  -CL       Row Name 09/27/24 1233          Plan of Care Review    Plan of Care Reviewed With patient;spouse  -CL     Outcome Evaluation Rodney Chaney is a 59 y.o. male with progressive CAD with hx of CABG, HTN, HLD, and DM. Pt underwent redo CABG by Dr. Mares on 9/26 and is seen by PT on POD #1.  Pt lives with his spouse in a [facility] with 10 LAUREN and handrail.  At baseline he is typically independent with all mobility & ADLs without AD. 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 His wife also works but plans to be home with him for several days when he first gets home.  At time of PT evaluation, he is A&O x 4 with moderate chest pain.  He requires min A for  transfers and ambulates 100' with RW and min A & 2nd person for line management.  He is currently requiring 5L to maintain.  He was educated on sternal precautions and verbalized understanding.  If he progresses as anticipated, he should not require PT after discharge and should be safe to return home with assist from spouse.  -CL       Row Name 09/27/24 1233          Therapy Assessment/Plan (PT)    Rehab Potential (PT) good, to achieve stated therapy goals  -CL     Criteria for Skilled Interventions Met (PT) yes;skilled treatment is necessary  -CL     Therapy Frequency (PT) 5 times/wk  -CL     Predicted Duration of Therapy Intervention (PT) Until discharge  -CL       Row Name 09/27/24 1233          Vital Signs    Pre Systolic BP Rehab 123  -CL     Pre Treatment Diastolic BP 68  -CL     Intra Systolic BP Rehab 111  -CL     Intra Treatment Diastolic BP 60  -CL     Post Systolic BP Rehab 127  -CL     Post Treatment Diastolic BP 66  -CL     Pretreatment Heart Rate (beats/min) 83  -CL     Posttreatment Heart Rate (beats/min) 81  -CL     Pretreatment Resp Rate (breaths/min) 25  -CL     Posttreatment Resp Rate (breaths/min) 22  -CL     Pre SpO2 (%) 100  -CL     O2 Delivery Pre Treatment supplemental O2  5L  -CL     Post SpO2 (%) 97  -CL     O2 Delivery Post Treatment supplemental O2  5L  -CL       Row Name 09/27/24 1233          Positioning and Restraints    Pre-Treatment Position sitting in chair/recliner  -CL     Post Treatment Position chair  -CL     In Chair notified nsg;reclined;call light within reach;encouraged to call for assist;with family/caregiver;exit alarm on  -CL               User Key  (r) = Recorded By, (t) = Taken By, (c) = Cosigned By      Initials Name Provider Type    Meghan Gabriel, PT Physical Therapist                   Outcome Measures       Row Name 09/27/24 1239 09/27/24 0800       How much help from another person do you currently need...    Turning from your back to your side while in  flat bed without using bedrails? 3  -CL 3  -SD    Moving from lying on back to sitting on the side of a flat bed without bedrails? 3  -CL 3  -SD    Moving to and from a bed to a chair (including a wheelchair)? 3  -CL 3  -SD    Standing up from a chair using your arms (e.g., wheelchair, bedside chair)? 3  -CL 3  -SD    Climbing 3-5 steps with a railing? 2  -CL 3  -SD    To walk in hospital room? 3  -CL 3  -SD    AM-PAC 6 Clicks Score (PT) 17  -CL 18  -SD    Highest Level of Mobility Goal 5 --> Static standing  -CL 6 --> Walk 10 steps or more  -SD              User Key  (r) = Recorded By, (t) = Taken By, (c) = Cosigned By      Initials Name Provider Type    CL Meghan Collier, PT Physical Therapist    Marcy Candelaria RN Registered Nurse                                 Physical Therapy Education       Title: PT OT SLP Therapies (Done)       Topic: Physical Therapy (Done)       Point: Mobility training (Done)       Learning Progress Summary             Patient Acceptance, E,TB, VU by  at 9/27/2024 1240   Significant Other Acceptance, E,TB, VU by CL at 9/27/2024 1240                                         User Key       Initials Effective Dates Name Provider Type Discipline     03/02/22 -  Meghan Collier, PT Physical Therapist PT                  PT Recommendation and Plan  Planned Therapy Interventions (PT): balance training, bed mobility training, gait training, home exercise program, patient/family education, strengthening, stair training, transfer training  Plan of Care Reviewed With: patient, spouse  Outcome Evaluation: Rodney Chaney is a 59 y.o. male with progressive CAD with hx of CABG, HTN, HLD, and DM. Pt underwent redo CABG by Dr. Mares on 9/26 and is seen by PT on POD #1.  Pt lives with his spouse in a [facility] with 10 LAUREN and handrail.  At baseline he is typically independent with all mobility & ADLs without AD. 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 His wife also works but  plans to be home with him for several days when he first gets home.  At time of PT evaluation, he is A&O x 4 with moderate chest pain.  He requires min A for transfers and ambulates 100' with RW and min A & 2nd person for line management.  He is currently requiring 5L to maintain.  He was educated on sternal precautions and verbalized understanding.  If he progresses as anticipated, he should not require PT after discharge and should be safe to return home with assist from spouse.     Time Calculation:   PT Evaluation Complexity  History, PT Evaluation Complexity: 3 or more personal factors and/or comorbidities  Examination of Body Systems (PT Eval Complexity): total of 3 or more elements  Clinical Presentation (PT Evaluation Complexity): evolving  Clinical Decision Making (PT Evaluation Complexity): moderate complexity  Overall Complexity (PT Evaluation Complexity): moderate complexity     PT Charges       Row Name 09/27/24 1241             Time Calculation    Start Time 1114  -CL      Stop Time 1132  -CL      Time Calculation (min) 18 min  -CL      PT Received On 09/27/24  -CL      PT - Next Appointment 09/29/24  -CL      PT Goal Re-Cert Due Date 10/11/24  -CL                User Key  (r) = Recorded By, (t) = Taken By, (c) = Cosigned By      Initials Name Provider Type    CL Meghan Collier, PT Physical Therapist                  Therapy Charges for Today       Code Description Service Date Service Provider Modifiers Qty    05578777610 HC PT EVAL MOD COMPLEXITY 4 9/27/2024 Meghan Collier, PT GP 1            PT G-Codes  AM-PAC 6 Clicks Score (PT): 17  PT Discharge Summary  Anticipated Discharge Disposition (PT): home with assist    Meghan Clolier PT  9/27/2024

## 2024-09-27 NOTE — PROGRESS NOTES
S/P POD# 1 reop CABG--Vishnu  EF 50-55% (echo)    Subjective:  no c/o's    Extubated ~ 2000  Drips:  insulin  CI 4.4, CVP 20,   Wt is up 7 kgs from preop  UF -2.4  CXR:  atel, mild vascular congestion      Intake/Output Summary (Last 24 hours) at 9/27/2024 0833  Last data filed at 9/27/2024 0825  Gross per 24 hour   Intake 7221 ml   Output 5400 ml   Net 1821 ml     Temp:  [96.4 °F (35.8 °C)-99 °F (37.2 °C)] 98.8 °F (37.1 °C)  Heart Rate:  [76-96] 87  Resp:  [12-26] 26  BP: ()/(46-76) 112/62  Arterial Line BP: (124)/(68) 124/68  FiO2 (%):  [40 %-100 %] 40 %  /8    Results from last 7 days   Lab Units 09/27/24  0320 09/26/24 2001 09/26/24  1638 09/26/24  1623 09/26/24  1229 09/26/24  1129 09/26/24  1037   WBC 10*3/mm3 6.75  --   --  6.99  --  5.82  --    HEMOGLOBIN g/dL 8.5*  --   --  11.2*  --  9.6*  --    HEMOGLOBIN, POC g/dL  --  10.9*   < >  --    < >  --   --    HEMATOCRIT % 26.9*  --   --  34.4*  --  29.1*  --    HEMATOCRIT POC %  --  32*   < >  --    < >  --   --    PLATELETS 10*3/mm3 71*  --   --  100*  --  106*  --    INR  1.41*  --   --   --   --  1.24* 1.27*    < > = values in this interval not displayed.     Results from last 7 days   Lab Units 09/27/24  0414 09/27/24  0320   CREATININE mg/dL 0.87  --    POTASSIUM mmol/L 4.3  --    SODIUM mmol/L 143  --    MAGNESIUM mg/dL  --  1.8   PHOSPHORUS mg/dL 4.1  --      ica 1.01    Physical Exam:  Neuro intact, nad, up in chair  Tele:  SR 80s  Diminished bases, 98% 5L  dsg c/d/i, no drainage  Benign abd, no BM  No edema    Assessment/Plan:  Principal Problem:    Coronary artery disease  Active Problems:    Coronary artery disease of bypass graft of native heart with stable angina pectoris    - MV CAD, hx off pump CABG x3 with LIMA to LAD, SVG to distal RCA, SVG to 1st diagonal (Vishnu, 6/2011), EF 50-55% (echo)--[facility]  - HTN--stable  - HLD--statin/ fenofibrate, cholestyramine  - DM type  2--followed by Dr. Mendenhall, on Jardiance/ metformin/ U-500 insulin  - Hypertriglyceridemia with hx pancreatitis--last trigly 1056 (3/2024)  - Postop ABLA, expected--watch closely  - Postop TCP, consumptive--watch closely    POD# 1.  Doing well, looks great.  ARMEN betancourt/ chele.  On asa/statin, add bb.  Repeat e-.  Diurese.  Endo consulted for DM mmgt.  Mobilize.  Re-evaluate toro later today.  Cordis located in an awkward location, will d/w Dr. Mares about removal.    Routine care--as above  D/w pt/nsg, Dr. Mares  Anticipate home at discharge    Anamika Nolan-Perri, JYOTHI  9/27/2024  08:33 EDT

## 2024-09-27 NOTE — CONSULTS
Inpatient Endocrine Consult  Consultation requested by cardiothoracic surgery team for postop diabetes management  Patient Care Team:  Prabhu Downing MD as PCP - General  Prabhu Downing MD as PCP - Family Medicine  Ham Jacinto MD as Consulting Physician (Cardiology)    Chief Complaint: Postop diabetes management    HPI: This is a 59-year-old male with history of type 2 diabetes, hypertension, hyperlipidemia, hypertriglyceridemia, CAD underwent CABG on September 26, 2024.  He is currently on IV insulin and has been requested to be seen in management of diabetes.    At home for type 2 diabetes he is on metformin 1000 twice a day, U 500 insulin 130 units sq before breakfast and lunch and 120 units before dinner,, Jardiance 25 mg p.o. daily.  He is now on Dexcom G7 monitoring system.     Currently on IV insulin requiring at 1.9 units/h.  He has started eating just now    Past Medical History:   Diagnosis Date    CAD (coronary artery disease)     Hyperlipidemia     Hypertension     Type 2 diabetes mellitus        Social History     Socioeconomic History    Marital status:      Spouse name: hari    Number of children: 0    Years of education: 19   Tobacco Use    Smoking status: Never    Smokeless tobacco: Never   Vaping Use    Vaping status: Never Used   Substance and Sexual Activity    Alcohol use: No    Drug use: Defer    Sexual activity: Defer       Family History   Problem Relation Age of Onset    Heart attack Mother     Alcohol abuse Father        Allergies   Allergen Reactions    Levofloxacin Unknown (See Comments)       ROS:   Constitutional:  Denies fatigue, tiredness.    Eyes:  Denies change in visual acuity   HENT:  Denies nasal congestion or sore throat   Respiratory: Denies cough, shortness of breath.   Cardiovascular:  Denies chest pain, edema   GI:  Denies abdominal pain, nausea, vomiting.   :  Denies polyuria and polydipsia  Musculoskeletal:  Denies back pain or joint pain   Integument:   Denies dry skin, rash   Neurologic:  Denies headache, focal weakness or sensory changes   Endocrine:  Denies polyuria or polydipsia   Psychiatric:  Denies depression or anxiety      Vitals:    09/27/24 1130   BP: 127/66   Pulse: 84   Resp:    Temp:    SpO2: 98%      Body mass index is 27.53 kg/m².     Physical Exam:  GEN: NAD, conversant  EYES: EOMI, PERRL  NECK: no thyromegaly  CV: RRR  LUNG: CTA  SKIN: no rashes, no acanthosis  MSK: no deformities,   NEURO: no tremors, DTR normal  PSYCH: Awake and coherent      Results Review:     I reviewed the patient's new clinical results.    Lab Results   Component Value Date    GLUCOSE 150 (H) 09/27/2024    BUN 13 09/27/2024    CREATININE 0.87 09/27/2024    EGFRIFNONA 76 12/30/2021    BCR 14.9 09/27/2024    K 4.3 09/27/2024    CO2 24.8 09/27/2024    CALCIUM 8.7 09/27/2024    PROTENTOTREF 6.8 06/17/2022    ALBUMIN 4.5 09/27/2024    LABIL2 CANCELED 06/17/2022    AST 25 09/24/2024    ALT 28 09/24/2024       Lab Results   Component Value Date    HGBA1C 8.70 (H) 03/04/2024    HGBA1C 8.9 (H) 02/10/2023    HGBA1C 9.6 (H) 06/17/2022     Lab Results   Component Value Date    MICROALBUR 2.1 03/04/2024    CREATININE 0.87 09/27/2024     Results from last 7 days   Lab Units 09/27/24  1157 09/27/24  1059 09/27/24  1047 09/27/24  1005 09/27/24  0852 09/27/24  0740   GLUCOSE mg/dL 135* 154* 165* 168* 196* 137*       Medication Review: Reviewed.       Current Facility-Administered Medications:     acetaminophen (TYLENOL) tablet 650 mg, 650 mg, Oral, Q4H PRN **OR** acetaminophen (TYLENOL) 160 MG/5ML oral solution 650 mg, 650 mg, Oral, Q4H PRN **OR** acetaminophen (TYLENOL) suppository 650 mg, 650 mg, Rectal, Q4H PRN, Anamika Castle APRN    aspirin EC tablet 81 mg, 81 mg, Oral, Daily, Anamika Castle APRN, 81 mg at 09/27/24 0822    Calcium Replacement - Follow Nurse / BPA Driven Protocol, , Does not apply, PRN, Anamika Castle, APRN    ceFAZolin 2000 mg  IVPB in 100 mL NS (MBP), 2,000 mg, Intravenous, Q8H, Satterly-Perri, Anamika L, APRN, 2,000 mg at 09/27/24 1053    chlorhexidine (PERIDEX) 0.12 % solution 15 mL, 15 mL, Mouth/Throat, Q12H, Satterly-Perri, Anamika L, APRN, 15 mL at 09/27/24 0822    cyclobenzaprine (FLEXERIL) tablet 5 mg, 5 mg, Oral, TID PRN, Satterly-Perri, Anamika L, APRN    dextrose (D50W) (25 g/50 mL) IV injection 10-50 mL, 10-50 mL, Intravenous, Q15 Min PRN, Satterly-Perri, Anamika L, APRN    dextrose (GLUTOSE) oral gel 15 g, 15 g, Oral, Q15 Min PRN, Satterly-Perri, Anamika L, APRN    [START ON 9/28/2024] Enoxaparin Sodium (LOVENOX) syringe 40 mg, 40 mg, Subcutaneous, Daily, Osmany Mares MD    glucagon (GLUCAGEN) injection 1 mg, 1 mg, Intramuscular, Q15 Min PRN, Satterly-Perri, Anamika L, APRN    HYDROcodone-acetaminophen (NORCO) 5-325 MG per tablet 1 tablet, 1 tablet, Oral, Q4H PRN, Satterly-Perri, Anamika L, APRN, 1 tablet at 09/27/24 1223    insulin regular 1 unit/mL in 0.9% sodium chloride (Glucommander), 0-100 Units/hr, Intravenous, Titrated, Satterly-Perri, Anamika L, APRN, Last Rate: 1.9 mL/hr at 09/27/24 1200, 1.9 Units/hr at 09/27/24 1200    Magnesium Cardiology Dose Replacement - Follow Nurse / BPA Driven Protocol, , Does not apply, PRN, Satterly-Perri, Anamika L, APRN    metoprolol tartrate (LOPRESSOR) tablet 25 mg, 25 mg, Oral, Q12H, Satterly-Perri, Anamika L, APRN, 25 mg at 09/27/24 1053    morphine injection 2 mg, 2 mg, Intravenous, Q2H PRN, 2 mg at 09/27/24 0421 **AND** naloxone (NARCAN) injection 0.4 mg, 0.4 mg, Intravenous, Q5 Min PRN, Anamika Castle, APRN    mupirocin (BACTROBAN) 2 % nasal ointment, , Each Nare, BID, Anamika Castle, APRN, 1 Application at 09/27/24 0822    nitroglycerin (NITROSTAT) SL tablet 0.4 mg, 0.4 mg, Sublingual, Q5 Min PRN, Anamika Castle, APRN    ondansetron (ZOFRAN) injection 4 mg, 4 mg, Intravenous, Q6H PRN, Anamika Castle,  APRN, 4 mg at 09/26/24 1551    pantoprazole (PROTONIX) EC tablet 40 mg, 40 mg, Oral, Q AM, Satterly-Perri, Anamika L, APRN, 40 mg at 09/27/24 0548    Phosphorus Replacement - Follow Nurse / BPA Driven Protocol, , Does not apply, PRN, Satterly-Perri, Anamika L, APRN    polyethylene glycol (MIRALAX) packet 17 g, 17 g, Oral, BID, Satterly-Perri, Anamika L, APRN, 17 g at 09/27/24 0822    Potassium Replacement - Follow Nurse / BPA Driven Protocol, , Does not apply, PRN, Satterly-Perri, Anamika L, APRN    rosuvastatin (CRESTOR) tablet 40 mg, 40 mg, Oral, Nightly, Satterly-Perri, Anamika L, APRN    sennosides-docusate (PERICOLACE) 8.6-50 MG per tablet 2 tablet, 2 tablet, Oral, BID, Satterly-Perri, Anamika L, APRN, 2 tablet at 09/27/24 0822    sodium chloride 0.9 % infusion, 30 mL/hr, Intravenous, Continuous, Satterly-Perri, Anamika L, APRN, Last Rate: 30 mL/hr at 09/26/24 1151, 30 mL/hr at 09/26/24 1151          Assessment and plan:  Diabetes mellitus type 2 with hyperglycemia: Uncontrolled, currently on IV insulin per CABG protocol.  Will continue that.  Will start subcu insulin about 2 days.    CAD: Status post CABG with postop day 2 today.    Hyperlipidemia/hypertriglyceridemia: Currently on rosuvastatin, will resume fenofibrate.    Thank you very much for the consultation.      Rosario Mendenhall MD FACE.

## 2024-09-27 NOTE — PROGRESS NOTES
CARDIOLOGY PROGRESS NOTE:    Rodney Chaney  59 y.o.  male  1965  [id number]      Referring Provider: Cardiac surgeon    Reason for follow-up: Status post coronary artery bypass surgery     Patient Care Team:  Prabhu Downing MD as PCP - General  Prabhu Downing MD as PCP - Family Medicine  Ham Jacinto MD as Consulting Physician (Cardiology)    Subjective .  Patient extubated and sitting in chair comfortably without any symptoms    Objective sitting in chair comfortably     Review of Systems   Constitutional: Negative for fever and malaise/fatigue.   HENT:  Negative for ear pain and nosebleeds.    Eyes:  Negative for blurred vision and double vision.   Cardiovascular:  Negative for chest pain, dyspnea on exertion and palpitations.   Respiratory:  Negative for cough and shortness of breath.    Skin:  Negative for rash.   Musculoskeletal:  Negative for joint pain.   Gastrointestinal:  Negative for abdominal pain, nausea and vomiting.   Neurological:  Negative for focal weakness and headaches.   Psychiatric/Behavioral:  Negative for depression. The patient is not nervous/anxious.    All other systems reviewed and are negative.      Allergies: Levofloxacin    Scheduled Meds:aspirin, 81 mg, Oral, Daily  bumetanide, 1 mg, Intravenous, Once  calcium gluconate, 2,000 mg, Intravenous, Once  ceFAZolin, 2,000 mg, Intravenous, Q8H  chlorhexidine, 15 mL, Mouth/Throat, Q12H  enoxaparin, 40 mg, Subcutaneous, Daily  magnesium sulfate, 1 g, Intravenous, Q8H  metoprolol tartrate, 25 mg, Oral, Q12H  mupirocin, , Each Nare, BID  pantoprazole, 40 mg, Oral, Q AM  polyethylene glycol, 17 g, Oral, BID  potassium chloride, 20 mEq, Oral, Once  rosuvastatin, 40 mg, Oral, Nightly  senna-docusate sodium, 2 tablet, Oral, BID      Continuous Infusions:insulin, 0-100 Units/hr, Last Rate: 4.3 Units/hr (09/27/24 0901)  sodium chloride, 30 mL/hr, Last Rate: 30 mL/hr (09/26/24 1151)      PRN Meds:.  acetaminophen **OR** acetaminophen **OR**  "acetaminophen    Calcium Replacement - Follow Nurse / BPA Driven Protocol    cyclobenzaprine    dextrose    dextrose    glucagon (human recombinant)    HYDROcodone-acetaminophen    Magnesium Cardiology Dose Replacement - Follow Nurse / BPA Driven Protocol    Morphine **AND** naloxone    nitroglycerin    ondansetron    Phosphorus Replacement - Follow Nurse / BPA Driven Protocol    Potassium Replacement - Follow Nurse / BPA Driven Protocol        VITAL SIGNS  Vitals:    09/27/24 0500 09/27/24 0600 09/27/24 0646 09/27/24 0736   BP: 109/62 111/62 140/46 112/62   BP Location:    Right arm   Patient Position:    Sitting   Pulse: 96 84 90 87   Resp:    26   Temp: 98.6 °F (37 °C) 98.8 °F (37.1 °C)  98.8 °F (37.1 °C)   TempSrc:       SpO2: 94% 97%  98%   Weight: 92.1 kg (203 lb 0.7 oz)      Height:           Flowsheet Rows      Flowsheet Row First Filed Value   Admission Height 182.9 cm (72.01\") Documented at 09/26/2024 1250   Admission Weight 85.3 kg (188 lb 0.8 oz) Documented at 09/26/2024 1250             TELEMETRY: Sinus rhythm    Physical Exam:  Constitutional:       Appearance: Well-developed.   Eyes:      General: No scleral icterus.     Conjunctiva/sclera: Conjunctivae normal.      Pupils: Pupils are equal, round, and reactive to light.   HENT:      Head: Normocephalic and atraumatic.   Neck:      Vascular: No carotid bruit or JVD.   Pulmonary:      Effort: Pulmonary effort is normal.      Breath sounds: Normal breath sounds. No wheezing. No rales.   Cardiovascular:      Normal rate. Regular rhythm.   Pulses:     Intact distal pulses.   Abdominal:      General: Bowel sounds are normal.      Palpations: Abdomen is soft.   Musculoskeletal: Normal range of motion.      Cervical back: Normal range of motion and neck supple. Skin:     General: Skin is warm and dry.      Findings: No rash.   Neurological:      Mental Status: Alert.      Comments: No focal deficits          Results Review:   I reviewed the patient's new " clinical results.  Lab Results (last 24 hours)       Procedure Component Value Units Date/Time    POC Glucose Once [[id number]]  (Abnormal) Collected: 09/27/24 1005    Specimen: Blood Updated: 09/27/24 1006     Glucose 168 mg/dL      Comment: Serial Number: [id number]:  [id number]       POC Glucose Once [[id number]]  (Abnormal) Collected: 09/27/24 0852    Specimen: Blood Updated: 09/27/24 0854     Glucose 196 mg/dL      Comment: Serial Number: [id number]:  [id number]       POC Glucose Once [[id number]]  (Abnormal) Collected: 09/27/24 0740    Specimen: Blood Updated: 09/27/24 0742     Glucose 137 mg/dL      Comment: Serial Number: [id number]:  [id number]       Blood Gas, Arterial - [[id number]]  (Abnormal) Collected: 09/27/24 0739    Specimen: Arterial Blood Updated: 09/27/24 0742     Site Arterial Line     Sam's Test N/A     pH, Arterial 7.400 pH units      pCO2, Arterial 41.8 mm Hg      pO2, Arterial 77.4 mm Hg      HCO3, Arterial 25.9 mmol/L      Base Excess, Arterial 1.0 mmol/L      Comment: Serial Number: [id number]:  [id number]        O2 Saturation, Arterial 95.3 %      CO2 Content 27.2 mmol/L      Barometric Pressure for Blood Gas --     Comment: N/A        Modality Cannula     FIO2 40 %      Hemodilution No     PO2/FIO2 194    POC Glucose Once [[id number]]  (Abnormal) Collected: 09/27/24 0543    Specimen: Blood Updated: 09/27/24 0545     Glucose 166 mg/dL      Comment: Serial Number: [id number]:  [id number]       Renal Function Panel [[id number]]  (Abnormal) Collected: 09/27/24 0414    Specimen: Blood Updated: 09/27/24 0518     Glucose 150 mg/dL      BUN 13 mg/dL      Creatinine 0.87 mg/dL      Sodium 143 mmol/L      Potassium 4.3 mmol/L      Chloride 113 mmol/L      CO2 24.8 mmol/L      Calcium 8.7 mg/dL      Albumin 4.5 g/dL      Phosphorus 4.1 mg/dL      Anion Gap 5.2 mmol/L      BUN/Creatinine Ratio 14.9     eGFR 99.4 mL/min/1.73     Narrative:      GFR Normal >60  Chronic Kidney  Disease <60  Kidney Failure <15      Blood Gas, Arterial - [[id number]]  (Abnormal) Collected: 09/27/24 0401    Specimen: Arterial Blood Updated: 09/27/24 0412     Site Arterial Line     Sam's Test N/A     pH, Arterial 7.322 pH units      pCO2, Arterial 51.3 mm Hg      pO2, Arterial 35.3 mm Hg      HCO3, Arterial 26.5 mmol/L      Base Excess, Arterial 0.0 mmol/L      Comment: Serial Number: [id number]:  [id number]        O2 Saturation, Arterial 62.1 %      CO2 Content 28.1 mmol/L      Barometric Pressure for Blood Gas --     Comment: N/A        Modality Cannula     FIO2 36 %      Notified Who NBA Montes     Read Back Yes     Notified Time --     Hemodilution No     PO2/FIO2 98    Blood Gas, Arterial - [[id number]]  (Abnormal) Collected: 09/27/24 0356    Specimen: Arterial Blood Updated: 09/27/24 0359     Site Arterial Line     Sam's Test N/A     pH, Arterial 7.333 pH units      pCO2, Arterial 49.2 mm Hg      pO2, Arterial 68.7 mm Hg      HCO3, Arterial 26.1 mmol/L      Base Excess, Arterial -0.2 mmol/L      Comment: Serial Number: [id number]:  [id number]        O2 Saturation, Arterial 91.9 %      CO2 Content 27.6 mmol/L      Barometric Pressure for Blood Gas --     Comment: N/A        Modality Cannula     FIO2 36 %      Hemodilution No     PO2/FIO2 191    POC Glucose Once [[id number]]  (Abnormal) Collected: 09/27/24 0356    Specimen: Arterial Blood Updated: 09/27/24 0359     Glucose 157 mg/dL      Comment: Serial Number: [id number]:  [id number]       Magnesium [[id number]]  (Normal) Collected: 09/27/24 0320    Specimen: Blood Updated: 09/27/24 0349     Magnesium 1.8 mg/dL     CBC & Differential [[id number]]  (Abnormal) Collected: 09/27/24 0320    Specimen: Blood Updated: 09/27/24 0339    Narrative:      The following orders were created for panel order CBC & Differential.  Procedure                               Abnormality         Status                     ---------                               -----------          ------                     CBC Auto Differential[[id number]]        Abnormal            Final result                 Please view results for these tests on the individual orders.    CBC Auto Differential [[id number]]  (Abnormal) Collected: 09/27/24 0320    Specimen: Blood Updated: 09/27/24 0339     WBC 6.75 10*3/mm3      RBC 3.06 10*6/mm3      Hemoglobin 8.5 g/dL      Comment: Result checked          Hematocrit 26.9 %      MCV 87.9 fL      MCH 27.8 pg      MCHC 31.6 g/dL      RDW 15.4 %      RDW-SD 49.6 fl      MPV 9.9 fL      Platelets 71 10*3/mm3      Neutrophil % 86.2 %      Lymphocyte % 8.1 %      Monocyte % 5.3 %      Eosinophil % 0.0 %      Basophil % 0.1 %      Immature Grans % 0.3 %      Neutrophils, Absolute 5.81 10*3/mm3      Lymphocytes, Absolute 0.55 10*3/mm3      Monocytes, Absolute 0.36 10*3/mm3      Eosinophils, Absolute 0.00 10*3/mm3      Basophils, Absolute 0.01 10*3/mm3      Immature Grans, Absolute 0.02 10*3/mm3      nRBC 0.0 /100 WBC     Calcium, Ionized [[id number]]  (Abnormal) Collected: 09/27/24 0320    Specimen: Blood Updated: 09/27/24 0338     Ionized Calcium 1.01 mmol/L     Protime-INR [[id number]]  (Abnormal) Collected: 09/27/24 0320    Specimen: Blood Updated: 09/27/24 0335     Protime 15.0 Seconds      INR 1.41    POC Glucose Once [[id number]]  (Abnormal) Collected: 09/27/24 0318    Specimen: Blood Updated: 09/27/24 0320     Glucose 160 mg/dL      Comment: Serial Number: [id number]:  [id number]       POC Glucose Once [[id number]]  (Abnormal) Collected: 09/27/24 0210    Specimen: Blood Updated: 09/27/24 0211     Glucose 183 mg/dL      Comment: Serial Number: [id number]:  [id number]       POC Glucose Once [[id number]]  (Normal) Collected: 09/27/24 0115    Specimen: Blood Updated: 09/27/24 0117     Glucose 104 mg/dL      Comment: Serial Number: [id number]:  [id number]       POC Glucose Once [[id number]]  (Abnormal) Collected: 09/26/24 2359    Specimen: Blood Updated: 09/27/24 0025      Glucose 134 mg/dL      Comment: Serial Number: [id number]:  [id number]       POC Glucose Once [[id number]]  (Abnormal) Collected: 09/26/24 2257    Specimen: Blood Updated: 09/26/24 2358     Glucose 146 mg/dL      Comment: Serial Number: [id number]:  [id number]       Phosphorus [[id number]]  (Normal) Collected: 09/26/24 2153    Specimen: Blood Updated: 09/26/24 2223     Phosphorus 4.4 mg/dL      Comment: Result checked         POC Glucose Once [[id number]]  (Abnormal) Collected: 09/26/24 2152    Specimen: Blood Updated: 09/26/24 2154     Glucose 157 mg/dL      Comment: Serial Number: [id number]:  [id number]       Blood Gas, Arterial - [[id number]]  (Abnormal) Collected: 09/26/24 2109    Specimen: Arterial Blood Updated: 09/26/24 2113     Site Arterial Line     Sam's Test N/A     pH, Arterial 7.356 pH units      pCO2, Arterial 43.0 mm Hg      pO2, Arterial 109.6 mm Hg      HCO3, Arterial 24.1 mmol/L      Base Excess, Arterial -1.4 mmol/L      Comment: Serial Number: [id number]:  [id number]        O2 Saturation, Arterial 98.0 %      CO2 Content 25.4 mmol/L      Barometric Pressure for Blood Gas --     Comment: N/A        Modality Cannula     FIO2 36 %      Hemodilution No     PO2/FIO2 304    POC Glucose Once [[id number]]  (Abnormal) Collected: 09/26/24 2044    Specimen: Blood Updated: 09/26/24 2045     Glucose 186 mg/dL      Comment: Serial Number: [id number]:  [id number]       POC Creatinine [[id number]]  (Normal) Collected: 09/26/24 2001    Specimen: Arterial Blood Updated: 09/26/24 2005     Creatinine 0.76 mg/dL      Comment: Serial Number: [id number]:  [id number]        eGFR 103.5 mL/min/1.73     Blood Gas, Arterial - [[id number]]  (Abnormal) Collected: 09/26/24 2001    Specimen: Arterial Blood Updated: 09/26/24 2005     Site Arterial Line     Sam's Test N/A     pH, Arterial 7.364 pH units      pCO2, Arterial 44.1 mm Hg      pO2, Arterial 128.8 mm Hg      HCO3, Arterial 25.2 mmol/L      Base  Excess, Arterial -0.4 mmol/L      Comment: Serial Number: [id number]:  [id number]        O2 Saturation, Arterial 98.8 %      Barometric Pressure for Blood Gas --     Comment: N/A        Modality Adult Vent     FIO2 40 %      Ventilator Mode CPAP/PS     PEEP 5     PSV 10 cmH2O      Hemodilution No     PO2/FIO2 322    POCT Electrolytes +HGB +HCT [[id number]]  (Abnormal) Collected: 09/26/24 2001    Specimen: Arterial Blood Updated: 09/26/24 2005     Sodium 144 mmol/L      POC Potassium 4.0 mmol/L      Ionized Calcium 1.25 mmol/L      Comment: Serial Number: [id number]:  [id number]        Glucose 210 mg/dL      Hematocrit 32 %      Hemoglobin 10.9 g/dL     POC Glucose Once [[id number]]  (Abnormal) Collected: 09/26/24 2001    Specimen: Arterial Blood Updated: 09/26/24 2005     Glucose 210 mg/dL      Comment: Serial Number: [id number]:  [id number]       POC Glucose Once [[id number]]  (Abnormal) Collected: 09/26/24 1929    Specimen: Blood Updated: 09/26/24 1931     Glucose 166 mg/dL      Comment: Serial Number: [id number]:  [id number]       POC Glucose Once [[id number]]  (Abnormal) Collected: 09/26/24 1830    Specimen: Blood Updated: 09/26/24 1832     Glucose 169 mg/dL      Comment: Serial Number: [id number]:  [id number]       Potassium [[id number]]  (Normal) Collected: 09/26/24 1623    Specimen: Blood from Arm, Left Updated: 09/26/24 1648     Potassium 3.6 mmol/L     Blood Gas, Arterial - [[id number]]  (Abnormal) Collected: 09/26/24 1638    Specimen: Arterial Blood Updated: 09/26/24 1643     Site Arterial Line     Sam's Test N/A     pH, Arterial 7.425 pH units      pCO2, Arterial 35.5 mm Hg      pO2, Arterial 127.9 mm Hg      HCO3, Arterial 23.3 mmol/L      Base Excess, Arterial -0.7 mmol/L      Comment: Serial Number: [id number]:  [id number]        O2 Saturation, Arterial 99.0 %      CO2 Content 24.4 mmol/L      Barometric Pressure for Blood Gas --     Comment: N/A        Modality Adult Vent     FIO2 40 %       Ventilator Mode AC     Set Tidal Volume 750     PEEP 5     Hemodilution Yes     Respiratory Rate 12     PO2/FIO2 320    POCT Electrolytes +HGB +HCT [[id number]]  (Abnormal) Collected: 09/26/24 1638    Specimen: Arterial Blood Updated: 09/26/24 1643     Sodium 143 mmol/L      POC Potassium 3.5 mmol/L      Ionized Calcium 1.21 mmol/L      Comment: Serial Number: [id number]:  [id number]        Glucose 188 mg/dL      Hematocrit 34 %      Hemoglobin 11.6 g/dL     POC Glucose Once [[id number]]  (Abnormal) Collected: 09/26/24 1638    Specimen: Arterial Blood Updated: 09/26/24 1643     Glucose 188 mg/dL      Comment: Serial Number: [id number]:  [id number]       CBC (No Diff) [[id number]]  (Abnormal) Collected: 09/26/24 1623    Specimen: Blood Updated: 09/26/24 1633     WBC 6.99 10*3/mm3      RBC 4.05 10*6/mm3      Hemoglobin 11.2 g/dL      Hematocrit 34.4 %      MCV 84.9 fL      MCH 27.7 pg      MCHC 32.6 g/dL      RDW 14.6 %      RDW-SD 45.3 fl      MPV 10.2 fL      Platelets 100 10*3/mm3     Blood Gas, Arterial - [[id number]]  (Abnormal) Collected: 09/26/24 1527    Specimen: Arterial Blood Updated: 09/26/24 1532     Site Arterial Line     Sam's Test N/A     pH, Arterial 7.442 pH units      pCO2, Arterial 33.3 mm Hg      pO2, Arterial 135.1 mm Hg      HCO3, Arterial 22.7 mmol/L      Base Excess, Arterial -0.9 mmol/L      Comment: Serial Number: [id number]:  [id number]        O2 Saturation, Arterial 99.2 %      CO2 Content 23.8 mmol/L      Barometric Pressure for Blood Gas --     Comment: N/A        Modality Adult Vent     FIO2 40 %      Ventilator Mode AC     Set Tidal Volume 800     PEEP 8     Hemodilution Yes     Respiratory Rate 12     PO2/FIO2 338    POCT Electrolytes +HGB +HCT [[id number]]  (Abnormal) Collected: 09/26/24 1527    Specimen: Arterial Blood Updated: 09/26/24 1532     Sodium 142 mmol/L      POC Potassium 3.9 mmol/L      Ionized Calcium 1.12 mmol/L      Comment: Serial Number: [id number]:  [id number]         Glucose 183 mg/dL      Hematocrit 35 %      Hemoglobin 11.8 g/dL     POC Glucose Once [[id number]]  (Abnormal) Collected: 09/26/24 1527    Specimen: Arterial Blood Updated: 09/26/24 1532     Glucose 183 mg/dL      Comment: Serial Number: [id number]:  [id number]       Blood Gas, Arterial - [[id number]]  (Abnormal) Collected: 09/26/24 1358    Specimen: Arterial Blood Updated: 09/26/24 1402     Site Arterial Line     Sam's Test N/A     pH, Arterial 7.461 pH units      pCO2, Arterial 33.8 mm Hg      pO2, Arterial 188.6 mm Hg      HCO3, Arterial 24.1 mmol/L      Base Excess, Arterial 0.6 mmol/L      Comment: Serial Number: [id number]:  [id number]        O2 Saturation, Arterial 99.7 %      CO2 Content 25.1 mmol/L      Barometric Pressure for Blood Gas --     Comment: N/A        Modality Adult Vent     FIO2 60 %      Ventilator Mode AC     Set Tidal Volume 800     PEEP 8     Hemodilution Yes     Respiratory Rate 12     PO2/FIO2 314    POCT Electrolytes +HGB +HCT [[id number]]  (Abnormal) Collected: 09/26/24 1358    Specimen: Arterial Blood Updated: 09/26/24 1402     Sodium 142 mmol/L      POC Potassium 3.8 mmol/L      Ionized Calcium 1.16 mmol/L      Comment: Serial Number: [id number]:  [id number]        Glucose 132 mg/dL      Hematocrit 35 %      Hemoglobin 11.7 g/dL     POC Glucose Once [[id number]]  (Abnormal) Collected: 09/26/24 1358    Specimen: Arterial Blood Updated: 09/26/24 1402     Glucose 132 mg/dL      Comment: Serial Number: [id number]:  [id number]       Blood Gas, Arterial - [[id number]]  (Abnormal) Collected: 09/26/24 1229    Specimen: Arterial Blood Updated: 09/26/24 1237     Site Arterial Line     Sam's Test N/A     pH, Arterial 7.503 pH units      pCO2, Arterial 23.9 mm Hg      pO2, Arterial 243.0 mm Hg      HCO3, Arterial 18.8 mmol/L      Base Excess, Arterial -3.0 mmol/L      Comment: Serial Number: [id number]:  [id number]        O2 Saturation, Arterial 99.9 %      CO2 Content 19.5 mmol/L      Barometric  Pressure for Blood Gas --     Comment: N/A        Modality Adult Vent     FIO2 100 %      Ventilator Mode AC     Set Tidal Volume 800     PEEP 8     Hemodilution Yes     Respiratory Rate 14     PO2/FIO2 243    POCT Electrolytes +HGB +HCT [[id number]]  (Abnormal) Collected: 09/26/24 1229    Specimen: Arterial Blood Updated: 09/26/24 1237     Sodium 139 mmol/L      POC Potassium 3.8 mmol/L      Ionized Calcium 1.10 mmol/L      Comment: Serial Number: [id number]:  [id number]        Glucose 121 mg/dL      Hematocrit 33 %      Hemoglobin 11.4 g/dL     POC Glucose Once [[id number]]  (Abnormal) Collected: 09/26/24 1229    Specimen: Arterial Blood Updated: 09/26/24 1237     Glucose 121 mg/dL      Comment: Serial Number: [id number]:  [id number]       Renal Function Panel [[id number]]  (Abnormal) Collected: 09/26/24 1129    Specimen: Blood Updated: 09/26/24 1209     Glucose 133 mg/dL      BUN 15 mg/dL      Creatinine 0.98 mg/dL      Sodium 139 mmol/L      Potassium 4.1 mmol/L      Chloride 105 mmol/L      CO2 24.7 mmol/L      Calcium 8.9 mg/dL      Albumin 4.4 g/dL      Phosphorus 0.9 mg/dL      Anion Gap 9.3 mmol/L      BUN/Creatinine Ratio 15.3     eGFR 88.8 mL/min/1.73     Narrative:      GFR Normal >60  Chronic Kidney Disease <60  Kidney Failure <15      aPTT [[id number]]  (Abnormal) Collected: 09/26/24 1129    Specimen: Blood Updated: 09/26/24 1149     PTT 21.6 seconds     Protime-INR [[id number]]  (Abnormal) Collected: 09/26/24 1129    Specimen: Blood Updated: 09/26/24 1149     Protime 13.3 Seconds      INR 1.24    CBC & Differential [[id number]]  (Abnormal) Collected: 09/26/24 1129    Specimen: Blood Updated: 09/26/24 1139    Narrative:      The following orders were created for panel order CBC & Differential.  Procedure                               Abnormality         Status                     ---------                               -----------         ------                     CBC Auto Differential[[id number]]         Abnormal            Final result                 Please view results for these tests on the individual orders.    CBC Auto Differential [[id number]]  (Abnormal) Collected: 09/26/24 1129    Specimen: Blood Updated: 09/26/24 1139     WBC 5.82 10*3/mm3      RBC 3.40 10*6/mm3      Hemoglobin 9.6 g/dL      Hematocrit 29.1 %      MCV 85.6 fL      MCH 28.2 pg      MCHC 33.0 g/dL      RDW 14.7 %      RDW-SD 46.1 fl      MPV 10.2 fL      Platelets 106 10*3/mm3      Neutrophil % 72.5 %      Lymphocyte % 18.0 %      Monocyte % 8.2 %      Eosinophil % 0.9 %      Basophil % 0.2 %      Immature Grans % 0.2 %      Neutrophils, Absolute 4.22 10*3/mm3      Lymphocytes, Absolute 1.05 10*3/mm3      Monocytes, Absolute 0.48 10*3/mm3      Eosinophils, Absolute 0.05 10*3/mm3      Basophils, Absolute 0.01 10*3/mm3      Immature Grans, Absolute 0.01 10*3/mm3      nRBC 0.0 /100 WBC     Calcium, Ionized [[id number]]  (Abnormal) Collected: 09/26/24 1129    Specimen: Blood Updated: 09/26/24 1137     Ionized Calcium 1.11 mmol/L     Fibrinogen [[id number]]  (Abnormal) Collected: 09/26/24 1037    Specimen: Blood, Arterial Line Updated: 09/26/24 1109     Fibrinogen 124 mg/dL     Protime-INR [[id number]]  (Abnormal) Collected: 09/26/24 1037    Specimen: Blood, Arterial Line Updated: 09/26/24 1109     Protime 13.6 Seconds      INR 1.27    aPTT [[id number]]  (Abnormal) Collected: 09/26/24 1037    Specimen: Blood, Arterial Line Updated: 09/26/24 1109     PTT <20.0 seconds     Platelet Function ADP [[id number]]  (Normal) Collected: 09/26/24 1000    Specimen: Blood, Arterial Line Updated: 09/26/24 1035     ADP Aggregation, % Platelet 95 %     CBC (No Diff) [[id number]]  (Abnormal) Collected: 09/26/24 1000    Specimen: Blood, Arterial Line Updated: 09/26/24 1034     WBC 5.92 10*3/mm3      RBC 3.02 10*6/mm3      Hemoglobin 8.5 g/dL      Hematocrit 26.0 %      MCV 86.1 fL      MCH 28.1 pg      MCHC 32.7 g/dL      RDW 14.7 %      RDW-SD 45.9 fl      MPV 9.9 fL       Platelets 122 10*3/mm3             Imaging Results (Last 24 Hours)       Procedure Component Value Units Date/Time    XR Chest 1 View [[id number]] Collected: 09/27/24 0727     Updated: 09/27/24 0732    Narrative:      XR CHEST 1 VW    Date of Exam: 9/27/2024 6:23 AM EDT    Indication: Post-Op Heart Surgery    Comparison: 9/26/2024    FINDINGS: Interval removal of endotracheal and NG tubes. There is no change in position of a right-sided [place] catheter with the tip at the level of the pulmonary artery outflow tract. Stable size of the heart and postoperative changes post CABG.   There is mild pulmonary vascular congestion which appears stable or mildly increased. Stable left pleural effusion with left basilar airspace disease likely atelectasis. Stable position of basilar chest tube      Impression:      Impression:  Interval removal of endotracheal and NG tubes    Cardiomegaly with stable versus mild increased pulmonary vascular congestion    Stable left pleural effusion with associated left-sided discoid atelectasis      Electronically Signed: Adama Bunch MD    9/27/2024 7:30 AM EDT    Workstation ID: [id number]    XR Chest 1 View [[id number]] Collected: 09/26/24 1302     Updated: 09/26/24 1306    Narrative:      XR CHEST 1 VW    Date of Exam: 9/26/2024 12:43 PM EDT    Indication: Post-Op Check Line & Tube Placement    Comparison: 8/24/2024    Findings:  There are postoperative changes of bypass surgery. Endotracheal tube is in place with the tip above the marvel. Nasoenteric tube tip is in the stomach. There is a right IJ line which terminates in the SVC. There is atelectasis in both lung bases. There   are low lung volumes present.      Impression:      Impression:    1. Postoperative chest with low lung volumes and atelectasis in the lung bases.  2. Tubes and lines appear in appropriate position.      Electronically Signed: Prabhu Brush MD    9/26/2024 1:03 PM EDT    Workstation ID: [id number]             EKG      I personally viewed and interpreted the patient's EKG/Telemetry data:    ECHOCARDIOGRAM:  Results for orders placed in visit on 09/26/24    Intra-Op Anesthesia EDER    Narrative  Intra-Op Anesthesia EDER    Procedure Performed: Intra-Op Anesthesia EDER  Start Time:  End Time:    Preanesthesia Checklist:  Patient identified, IV assessed, risks and benefits discussed, monitors and equipment assessed, procedure being performed at surgeon's request and anesthesia consent obtained.    General Procedure Information  EDER Placed for monitoring purposes only -- This is not a diagnostic EDER  Diagnostic Indications for Echo:  hemodynamic monitoring  Location performed:  OR  Intubated  Bite block placed  Heart visualized  Probe Insertion:  Easy  Probe Type:  Biplane  Modalities:  Color flow mapping, continuous wave Doppler and 2D only    Echocardiographic and Doppler Measurements    Ventricles    Right Ventricle:  Cavity size normal.  Hypertrophy not present.  Global function normal.  Left Ventricle:  Cavity size normal.  Global Function normal.  Ejection Fraction 55%.        Valves    Aortic Valve:  Regurgitation absent.  Leaflet motions normal.    Mitral Valve:  Annulus normal.  Regurgitation trace.  Leaflets normal.  Leaflet motions normal.    Tricuspid Valve:  Regurgitation absent.  Leaflet motions normal.      Aorta    Ascending Aorta:  Size normal.  Mobile plaque not present.  Aortic Arch:  Size normal.  Mobile plaque not present.  Descending Aorta:  Size normal.  Mobile plaque not present.      Atria    Right Atrium:  Size normal.  Left Atrium:  Size normal.                Anesthesia Information  Performed Personally  Anesthesiologist:  Benson Fisher MD      Echocardiogram Comments:  Pre CPB:         Trace MR, EF 55%  Post CPB:    no change       STRESS MYOVIEW:  Results for orders placed during the hospital encounter of 08/20/24    Stress Test With Myocardial Perfusion - One Day    Interpretation  Summary    Impressions are consistent with a high risk study.    Left ventricular ejection fraction is mildly reduced (Calculated EF = 43%).    Myocardial perfusion imaging indicates a large-sized, severe area of ischemia located in the inferior wall and lateral wall.       CARDIAC CATHETERIZATION:  Results for orders placed during the hospital encounter of 08/30/24    Cardiac Catheterization/Vascular Study       OTHER:         Assessment & Plan     Coronary artery disease  Patient underwent redo coronary bypass surgery with SVG to the PDA OM and diagonal branches but have also has a LIMA from the previous surgery which is patent to the LAD  Patient is currently intubated and is off sedation  He will be extubated soon  Patient's chest tubes and Dorsey catheter draining well.  Blood pressure is currently slightly high and hence is on Cardene  Will start him on oral medicines once he is extubated.    9.27.  Patient is extubated and sitting in chair comfortably.  His swan catheter is taken out.  He still has chest tubes which are draining well and will be removed according to the surgeons  Will start ambulating also.    Hypertension  Patient blood pressure currently stable and is on oral medicines    Hyperlipidemia  Patient patient is started on statins    Diabetes  Patient is on insulin and will be managed by the primary care doctor.    -    I discussed the patients findings and my recommendations with patient's nurse  Ham Jacinto MD  09/27/24  10:24 EDT

## 2024-09-27 NOTE — CASE MANAGEMENT/SOCIAL WORK
Discharge Planning Assessment  [facility]     Patient Name: Rodney Chaney  MRN: [id number]  Today's Date: 9/27/2024    Admit Date: 9/26/2024    Plan: DC Plan: Pending clinical course and PT/OT evals. From home with family. CABG ReOp 9/26/2024.   Discharge Needs Assessment       Row Name 09/27/24 1658       Living Environment    People in Home spouse    Name(s) of People in Home Tomeka Shiv    Current Living Arrangements home    Potentially Unsafe Housing Conditions none    In the past 12 months has the electric, gas, oil, or water company threatened to shut off services in your home? No    Primary Care Provided by self    Provides Primary Care For no one    Family Caregiver if Needed spouse    Family Caregiver Names Tomeka Chaney    Quality of Family Relationships helpful;involved;supportive    Able to Return to Prior Arrangements yes       Resource/Environmental Concerns    Resource/Environmental Concerns none    Transportation Concerns none       Transportation Needs    In the past 12 months, has lack of transportation kept you from medical appointments or from getting medications? no    In the past 12 months, has lack of transportation kept you from meetings, work, or from getting things needed for daily living? No       Food Insecurity    Within the past 12 months, you worried that your food would run out before you got the money to buy more. Never true    Within the past 12 months, the food you bought just didn't last and you didn't have money to get more. Never true       Transition Planning    Patient/Family Anticipates Transition to home with family    Patient/Family Anticipated Services at Transition none    Transportation Anticipated car, drives self;family or friend will provide       Discharge Needs Assessment    Readmission Within the Last 30 Days no previous admission in last 30 days    Equipment Currently Used at Home none    Concerns to be Addressed discharge planning    Anticipated Changes Related to  Illness none    Equipment Needed After Discharge none    Provided Post Acute Provider List? N/A    Provided Post Acute Provider Quality & Resource List? N/A                   Discharge Plan       Row Name 09/27/24 1655       Plan    Plan DC Plan: Pending clinical course and PT/OT evals. From home with family. CABG ReOp 9/26/2024.    Patient/Family in Agreement with Plan yes    Provided Post Acute Provider List? N/A    Provided Post Acute Provider Quality & Resource List? N/A    Plan Comments CM spoke with patient at bedside to discuss admission assessment and discharge planning. Patient confirms PCP and pharmacy. Patient confirms he is agreeable to enrolling in meds to bed program. CM enrolled patient and updated pharmacy in [facility]. Patient denies any difficulty affording medications or food at this time. Patient denies any additional needs for services or DME at this time. Patient reports IADL's and drives. Patient spouse will provide transportation when ready for DC.CM spoke with patient’s nurse and [facility] NP Anamika Goldman to obtain clinical updates.CM will continue to follow for any additional needs and adjust DC plan accordingly. DC Barriers: POD 1 OHS, Cardiac monitoring, O2@5L nc, central line, pacer wires, Chest tubes x2, Insulin gtt, IV abx/Electrolytes/Bumex, and monitor labs.               Expected Discharge Date and Time       Expected Discharge Date Expected Discharge Time    Oct 1, 2024            Demographic Summary       Row Name 09/27/24 7213       General Information    Admission Type inpatient    Arrived From home;operating room    Referral Source admission list    Reason for Consult discharge planning    Preferred Language English       Contact Information    Permission Granted to Share Info With                    Functional Status       Row Name 09/27/24 1658       Functional Status    Usual Activity Tolerance excellent    Current Activity Tolerance moderate       Physical  Activity    On average, how many days per week do you engage in moderate to strenuous exercise (like a brisk walk)? 3 days    On average, how many minutes do you engage in exercise at this level? 30 min    Number of minutes of exercise per week 90       Functional Status, IADL    Medications independent    Meal Preparation independent    Housekeeping independent    Laundry independent    Shopping independent       Mental Status    General Appearance WDL WDL       Mental Status Summary    Recent Changes in Mental Status/Cognitive Functioning no changes       Employment/    Employment Status employed full-time;, previous service    Employment/ Comments [employer]           Current or Previous  Service none                 Met with patient in room   Maintain distance greater than six feet and spent less than fifteen minutes in the room.      June Wilcox RN     Office Phone: [phone]  Office Cell:     [phone]

## 2024-09-28 ENCOUNTER — APPOINTMENT (OUTPATIENT)
Dept: GENERAL RADIOLOGY | Facility: HOSPITAL | Age: 59
End: 2024-09-28
Payer: COMMERCIAL

## 2024-09-28 LAB
ANION GAP SERPL CALCULATED.3IONS-SCNC: 8.7 MMOL/L (ref 5–15)
BUN SERPL-MCNC: 13 MG/DL (ref 6–20)
BUN/CREAT SERPL: 14.6 (ref 7–25)
CALCIUM SPEC-SCNC: 8.6 MG/DL (ref 8.6–10.5)
CHLORIDE SERPL-SCNC: 106 MMOL/L (ref 98–107)
CO2 SERPL-SCNC: 26.3 MMOL/L (ref 22–29)
CREAT SERPL-MCNC: 0.89 MG/DL (ref 0.76–1.27)
DEPRECATED RDW RBC AUTO: 52.8 FL (ref 37–54)
EGFRCR SERPLBLD CKD-EPI 2021: 98.7 ML/MIN/1.73
ERYTHROCYTE [DISTWIDTH] IN BLOOD BY AUTOMATED COUNT: 15.8 % (ref 12.3–15.4)
GLUCOSE BLDC GLUCOMTR-MCNC: 107 MG/DL (ref 70–105)
GLUCOSE BLDC GLUCOMTR-MCNC: 108 MG/DL (ref 70–105)
GLUCOSE BLDC GLUCOMTR-MCNC: 131 MG/DL (ref 70–105)
GLUCOSE BLDC GLUCOMTR-MCNC: 132 MG/DL (ref 70–105)
GLUCOSE BLDC GLUCOMTR-MCNC: 138 MG/DL (ref 70–105)
GLUCOSE BLDC GLUCOMTR-MCNC: 154 MG/DL (ref 70–105)
GLUCOSE BLDC GLUCOMTR-MCNC: 156 MG/DL (ref 70–105)
GLUCOSE BLDC GLUCOMTR-MCNC: 161 MG/DL (ref 70–105)
GLUCOSE BLDC GLUCOMTR-MCNC: 169 MG/DL (ref 70–105)
GLUCOSE BLDC GLUCOMTR-MCNC: 174 MG/DL (ref 70–105)
GLUCOSE BLDC GLUCOMTR-MCNC: 184 MG/DL (ref 70–105)
GLUCOSE BLDC GLUCOMTR-MCNC: 200 MG/DL (ref 70–105)
GLUCOSE BLDC GLUCOMTR-MCNC: 200 MG/DL (ref 70–105)
GLUCOSE BLDC GLUCOMTR-MCNC: 220 MG/DL (ref 70–105)
GLUCOSE BLDC GLUCOMTR-MCNC: 220 MG/DL (ref 70–105)
GLUCOSE BLDC GLUCOMTR-MCNC: 221 MG/DL (ref 70–105)
GLUCOSE SERPL-MCNC: 119 MG/DL (ref 65–99)
HCT VFR BLD AUTO: 36 % (ref 37.5–51)
HGB BLD-MCNC: 11 G/DL (ref 13–17.7)
MCH RBC QN AUTO: 27.7 PG (ref 26.6–33)
MCHC RBC AUTO-ENTMCNC: 30.6 G/DL (ref 31.5–35.7)
MCV RBC AUTO: 90.7 FL (ref 79–97)
PLATELET # BLD AUTO: 77 10*3/MM3 (ref 140–450)
PMV BLD AUTO: 10.9 FL (ref 6–12)
POTASSIUM SERPL-SCNC: 4.1 MMOL/L (ref 3.5–5.2)
QT INTERVAL: 353 MS
QTC INTERVAL: 436 MS
RBC # BLD AUTO: 3.97 10*6/MM3 (ref 4.14–5.8)
SODIUM SERPL-SCNC: 141 MMOL/L (ref 136–145)
WBC NRBC COR # BLD AUTO: 8.63 10*3/MM3 (ref 3.4–10.8)

## 2024-09-28 PROCEDURE — 71045 X-RAY EXAM CHEST 1 VIEW: CPT

## 2024-09-28 PROCEDURE — 99232 SBSQ HOSP IP/OBS MODERATE 35: CPT | Performed by: INTERNAL MEDICINE

## 2024-09-28 PROCEDURE — 80048 BASIC METABOLIC PNL TOTAL CA: CPT | Performed by: NURSE PRACTITIONER

## 2024-09-28 PROCEDURE — 25010000002 CEFAZOLIN PER 500 MG: Performed by: NURSE PRACTITIONER

## 2024-09-28 PROCEDURE — 85027 COMPLETE CBC AUTOMATED: CPT | Performed by: NURSE PRACTITIONER

## 2024-09-28 PROCEDURE — 82948 REAGENT STRIP/BLOOD GLUCOSE: CPT | Performed by: INTERNAL MEDICINE

## 2024-09-28 PROCEDURE — 82948 REAGENT STRIP/BLOOD GLUCOSE: CPT

## 2024-09-28 PROCEDURE — P9100 PATHOGEN TEST FOR PLATELETS: HCPCS

## 2024-09-28 PROCEDURE — 25010000002 LIDOCAINE 1 % SOLUTION

## 2024-09-28 PROCEDURE — P9035 PLATELET PHERES LEUKOREDUCED: HCPCS

## 2024-09-28 PROCEDURE — 93010 ELECTROCARDIOGRAM REPORT: CPT | Performed by: INTERNAL MEDICINE

## 2024-09-28 PROCEDURE — 25010000002 MORPHINE PER 10 MG: Performed by: NURSE PRACTITIONER

## 2024-09-28 PROCEDURE — 93005 ELECTROCARDIOGRAM TRACING: CPT | Performed by: NURSE PRACTITIONER

## 2024-09-28 PROCEDURE — 25010000002 FUROSEMIDE PER 20 MG

## 2024-09-28 PROCEDURE — 36430 TRANSFUSION BLD/BLD COMPNT: CPT

## 2024-09-28 RX ORDER — IBUPROFEN 600 MG/1
1 TABLET ORAL
Status: DISCONTINUED | OUTPATIENT
Start: 2024-09-28 | End: 2024-10-02 | Stop reason: HOSPADM

## 2024-09-28 RX ORDER — FUROSEMIDE 10 MG/ML
40 INJECTION INTRAMUSCULAR; INTRAVENOUS ONCE
Status: COMPLETED | OUTPATIENT
Start: 2024-09-28 | End: 2024-09-28

## 2024-09-28 RX ORDER — METOPROLOL TARTRATE 50 MG
50 TABLET ORAL EVERY 12 HOURS SCHEDULED
Status: DISCONTINUED | OUTPATIENT
Start: 2024-09-28 | End: 2024-09-29

## 2024-09-28 RX ORDER — INSULIN LISPRO 100 [IU]/ML
4-24 INJECTION, SOLUTION INTRAVENOUS; SUBCUTANEOUS
Status: DISCONTINUED | OUTPATIENT
Start: 2024-09-29 | End: 2024-10-02 | Stop reason: HOSPADM

## 2024-09-28 RX ORDER — LIDOCAINE HYDROCHLORIDE 10 MG/ML
10 INJECTION, SOLUTION INFILTRATION; PERINEURAL ONCE
Status: DISCONTINUED | OUTPATIENT
Start: 2024-09-28 | End: 2024-09-28

## 2024-09-28 RX ORDER — ENOXAPARIN SODIUM 100 MG/ML
40 INJECTION SUBCUTANEOUS DAILY
Status: DISCONTINUED | OUTPATIENT
Start: 2024-09-29 | End: 2024-09-29

## 2024-09-28 RX ORDER — METOPROLOL TARTRATE 25 MG/1
25 TABLET, FILM COATED ORAL ONCE
Status: COMPLETED | OUTPATIENT
Start: 2024-09-28 | End: 2024-09-28

## 2024-09-28 RX ORDER — NICOTINE POLACRILEX 4 MG
15 LOZENGE BUCCAL
Status: DISCONTINUED | OUTPATIENT
Start: 2024-09-28 | End: 2024-10-02 | Stop reason: HOSPADM

## 2024-09-28 RX ORDER — DEXTROSE MONOHYDRATE 25 G/50ML
25 INJECTION, SOLUTION INTRAVENOUS
Status: DISCONTINUED | OUTPATIENT
Start: 2024-09-28 | End: 2024-10-02 | Stop reason: HOSPADM

## 2024-09-28 RX ADMIN — CHLORHEXIDINE GLUCONATE, 0.12% ORAL RINSE 15 ML: 1.2 SOLUTION DENTAL at 20:54

## 2024-09-28 RX ADMIN — CYCLOBENZAPRINE 5 MG: 10 TABLET, FILM COATED ORAL at 16:58

## 2024-09-28 RX ADMIN — HYDROCODONE BITARTRATE AND ACETAMINOPHEN 1 TABLET: 5; 325 TABLET ORAL at 22:09

## 2024-09-28 RX ADMIN — LIDOCAINE HYDROCHLORIDE 5 ML: 10 INJECTION, SOLUTION INFILTRATION; PERINEURAL at 10:00

## 2024-09-28 RX ADMIN — HYDROCODONE BITARTRATE AND ACETAMINOPHEN 1 TABLET: 5; 325 TABLET ORAL at 05:43

## 2024-09-28 RX ADMIN — PANTOPRAZOLE SODIUM 40 MG: 40 TABLET, DELAYED RELEASE ORAL at 05:36

## 2024-09-28 RX ADMIN — INSULIN HUMAN 5 UNITS/HR: 1 INJECTION, SOLUTION INTRAVENOUS at 11:43

## 2024-09-28 RX ADMIN — MORPHINE SULFATE 2 MG: 2 INJECTION, SOLUTION INTRAMUSCULAR; INTRAVENOUS at 23:10

## 2024-09-28 RX ADMIN — FUROSEMIDE 40 MG: 10 INJECTION, SOLUTION INTRAMUSCULAR; INTRAVENOUS at 13:59

## 2024-09-28 RX ADMIN — POLYETHYLENE GLYCOL 3350 17 G: 17 POWDER, FOR SOLUTION ORAL at 20:54

## 2024-09-28 RX ADMIN — ASPIRIN 81 MG: 81 TABLET, COATED ORAL at 08:27

## 2024-09-28 RX ADMIN — MUPIROCIN 1 APPLICATION: 20 OINTMENT TOPICAL at 20:54

## 2024-09-28 RX ADMIN — MORPHINE SULFATE 2 MG: 2 INJECTION, SOLUTION INTRAMUSCULAR; INTRAVENOUS at 11:29

## 2024-09-28 RX ADMIN — FENOFIBRATE 145 MG: 145 TABLET ORAL at 08:27

## 2024-09-28 RX ADMIN — MUPIROCIN 1 APPLICATION: 20 OINTMENT TOPICAL at 08:26

## 2024-09-28 RX ADMIN — METOPROLOL TARTRATE 25 MG: 25 TABLET, FILM COATED ORAL at 08:26

## 2024-09-28 RX ADMIN — ROSUVASTATIN 40 MG: 10 TABLET, FILM COATED ORAL at 20:54

## 2024-09-28 RX ADMIN — METOPROLOL TARTRATE 50 MG: 50 TABLET, FILM COATED ORAL at 20:54

## 2024-09-28 RX ADMIN — POLYETHYLENE GLYCOL 3350 17 G: 17 POWDER, FOR SOLUTION ORAL at 08:26

## 2024-09-28 RX ADMIN — HYDROCODONE BITARTRATE AND ACETAMINOPHEN 1 TABLET: 5; 325 TABLET ORAL at 13:58

## 2024-09-28 RX ADMIN — CHLORHEXIDINE GLUCONATE, 0.12% ORAL RINSE 15 ML: 1.2 SOLUTION DENTAL at 08:27

## 2024-09-28 RX ADMIN — SODIUM CHLORIDE 2000 MG: 900 INJECTION INTRAVENOUS at 02:11

## 2024-09-28 RX ADMIN — SENNOSIDES AND DOCUSATE SODIUM 2 TABLET: 50; 8.6 TABLET ORAL at 08:26

## 2024-09-28 RX ADMIN — SENNOSIDES AND DOCUSATE SODIUM 2 TABLET: 50; 8.6 TABLET ORAL at 20:54

## 2024-09-28 RX ADMIN — METOPROLOL TARTRATE 25 MG: 25 TABLET, FILM COATED ORAL at 11:28

## 2024-09-28 RX ADMIN — MORPHINE SULFATE 2 MG: 2 INJECTION, SOLUTION INTRAMUSCULAR; INTRAVENOUS at 16:58

## 2024-09-28 NOTE — PLAN OF CARE
Goal Outcome Evaluation:  Plan of Care Reviewed With: patient        Progress: improving       Pt on 1L NC, pain is better controlled than previous. Pt up in chair for am with 1-2 assist oob but moves with standby assist once on feet.

## 2024-09-28 NOTE — PROGRESS NOTES
CARDIOLOGY PROGRESS NOTE:    Rodney Chaney  59 y.o.  male  1965  [id number]      Referring Provider: Cardiac surgeon    Reason for follow-up: Status post coronary artery bypass surgery     Patient Care Team:  Prabhu Downing MD as PCP - General  Prabhu Downing MD as PCP - Family Medicine  Ham Jacinto MD as Consulting Physician (Cardiology)    Subjective .  Patient extubated and sitting in chair comfortably without any symptoms    Objective sitting in chair comfortably     Review of Systems   Constitutional: Negative for fever and malaise/fatigue.   HENT:  Negative for ear pain and nosebleeds.    Eyes:  Negative for blurred vision and double vision.   Cardiovascular:  Negative for chest pain, dyspnea on exertion and palpitations.   Respiratory:  Negative for cough and shortness of breath.    Skin:  Negative for rash.   Musculoskeletal:  Negative for joint pain.   Gastrointestinal:  Negative for abdominal pain, nausea and vomiting.   Neurological:  Negative for focal weakness and headaches.   Psychiatric/Behavioral:  Negative for depression. The patient is not nervous/anxious.    All other systems reviewed and are negative.      Allergies: Levofloxacin    Scheduled Meds:aspirin, 81 mg, Oral, Daily  chlorhexidine, 15 mL, Mouth/Throat, Q12H  [START ON 9/29/2024] enoxaparin, 40 mg, Subcutaneous, Daily  fenofibrate, 145 mg, Oral, Daily  furosemide, 40 mg, Intravenous, Once  metoprolol tartrate, 25 mg, Oral, Once  metoprolol tartrate, 50 mg, Oral, Q12H  mupirocin, , Each Nare, BID  pantoprazole, 40 mg, Oral, Q AM  polyethylene glycol, 17 g, Oral, BID  rosuvastatin, 40 mg, Oral, Nightly  senna-docusate sodium, 2 tablet, Oral, BID      Continuous Infusions:insulin, 0-100 Units/hr, Last Rate: 2.2 Units/hr (09/28/24 0642)  sodium chloride, 30 mL/hr, Last Rate: 30 mL/hr (09/26/24 1151)      PRN Meds:.  acetaminophen **OR** acetaminophen **OR** acetaminophen    Calcium Replacement - Follow Nurse / BPA Driven Protocol     "cyclobenzaprine    dextrose    dextrose    glucagon (human recombinant)    HYDROcodone-acetaminophen    Magnesium Cardiology Dose Replacement - Follow Nurse / BPA Driven Protocol    Morphine **AND** naloxone    nitroglycerin    ondansetron    Phosphorus Replacement - Follow Nurse / BPA Driven Protocol    Potassium Replacement - Follow Nurse / BPA Driven Protocol        VITAL SIGNS  Vitals:    09/28/24 0730 09/28/24 0801 09/28/24 0900 09/28/24 0930   BP: 128/68 136/76 113/79 127/64   BP Location:       Patient Position:  Sitting     Pulse: 83 101 94 90   Resp:  25     Temp:  97.6 °F (36.4 °C)     TempSrc:  Oral     SpO2: 94% 90% 91% 95%   Weight:       Height:           Flowsheet Rows      Flowsheet Row First Filed Value   Admission Height 182.9 cm (72.01\") Documented at 09/26/2024 1250   Admission Weight 85.3 kg (188 lb 0.8 oz) Documented at 09/26/2024 1250             TELEMETRY: Sinus rhythm    Physical Exam:  Constitutional:       Appearance: Well-developed.   Eyes:      General: No scleral icterus.     Conjunctiva/sclera: Conjunctivae normal.      Pupils: Pupils are equal, round, and reactive to light.   HENT:      Head: Normocephalic and atraumatic.   Neck:      Vascular: No carotid bruit or JVD.   Pulmonary:      Effort: Pulmonary effort is normal.      Breath sounds: Normal breath sounds. No wheezing. No rales.   Cardiovascular:      Normal rate. Regular rhythm.   Pulses:     Intact distal pulses.   Abdominal:      General: Bowel sounds are normal.      Palpations: Abdomen is soft.   Musculoskeletal: Normal range of motion.      Cervical back: Normal range of motion and neck supple. Skin:     General: Skin is warm and dry.      Findings: No rash.   Neurological:      Mental Status: Alert.      Comments: No focal deficits          Results Review:   I reviewed the patient's new clinical results.  Lab Results (last 24 hours)       Procedure Component Value Units Date/Time    POC Glucose Once [[id number]]  " (Abnormal) Collected: 09/28/24 0951    Specimen: Blood Updated: 09/28/24 0953     Glucose 221 mg/dL      Comment: Serial Number: [id number]:  [id number]       POC Glucose Once [[id number]]  (Abnormal) Collected: 09/28/24 0832    Specimen: Blood Updated: 09/28/24 0834     Glucose 184 mg/dL      Comment: Serial Number: [id number]:  [id number]       POC Glucose Once [[id number]]  (Abnormal) Collected: 09/28/24 0641    Specimen: Blood Updated: 09/28/24 0643     Glucose 169 mg/dL      Comment: Serial Number: [id number]:  [id number]       POC Glucose Once [[id number]]  (Abnormal) Collected: 09/28/24 0522    Specimen: Blood Updated: 09/28/24 0523     Glucose 154 mg/dL      Comment: Serial Number: [id number]:  [id number]       Basic Metabolic Panel [[id number]]  (Abnormal) Collected: 09/28/24 0350    Specimen: Blood Updated: 09/28/24 0422     Glucose 119 mg/dL      BUN 13 mg/dL      Creatinine 0.89 mg/dL      Sodium 141 mmol/L      Potassium 4.1 mmol/L      Chloride 106 mmol/L      CO2 26.3 mmol/L      Calcium 8.6 mg/dL      BUN/Creatinine Ratio 14.6     Anion Gap 8.7 mmol/L      eGFR 98.7 mL/min/1.73     Narrative:      GFR Normal >60  Chronic Kidney Disease <60  Kidney Failure <15      CBC (No Diff) [[id number]]  (Abnormal) Collected: 09/28/24 0350    Specimen: Blood Updated: 09/28/24 0401     WBC 8.63 10*3/mm3      RBC 3.97 10*6/mm3      Hemoglobin 11.0 g/dL      Hematocrit 36.0 %      MCV 90.7 fL      MCH 27.7 pg      MCHC 30.6 g/dL      RDW 15.8 %      RDW-SD 52.8 fl      MPV 10.9 fL      Platelets 77 10*3/mm3     POC Glucose Once [[id number]]  (Abnormal) Collected: 09/28/24 0342    Specimen: Blood Updated: 09/28/24 0344     Glucose 132 mg/dL      Comment: Serial Number: [id number]:  [id number]       POC Glucose Once [[id number]]  (Abnormal) Collected: 09/28/24 0257    Specimen: Blood Updated: 09/28/24 0259     Glucose 108 mg/dL      Comment: Serial Number: [id number]:  [id number]       POC  Glucose Once [[id number]]  (Abnormal) Collected: 09/28/24 0133    Specimen: Blood Updated: 09/28/24 0135     Glucose 138 mg/dL      Comment: Serial Number: [id number]:  [id number]       POC Glucose Once [[id number]]  (Abnormal) Collected: 09/27/24 2325    Specimen: Blood Updated: 09/27/24 2326     Glucose 144 mg/dL      Comment: Serial Number: [id number]:  [id number]       POC Glucose Once [[id number]]  (Abnormal) Collected: 09/27/24 2230    Specimen: Blood Updated: 09/27/24 2232     Glucose 150 mg/dL      Comment: Serial Number: [id number]:  [id number]       POC Glucose Once [[id number]]  (Abnormal) Collected: 09/27/24 2052    Specimen: Blood Updated: 09/27/24 2054     Glucose 176 mg/dL      Comment: Serial Number: [id number]:  [id number]       POC Glucose Once [[id number]]  (Abnormal) Collected: 09/27/24 1928    Specimen: Blood Updated: 09/27/24 2052     Glucose 221 mg/dL      Comment: Serial Number: [id number]:  [id number]       POC Glucose Once [[id number]]  (Abnormal) Collected: 09/27/24 1833    Specimen: Blood Updated: 09/27/24 1835     Glucose 182 mg/dL      Comment: Serial Number: [id number]:  [id number]       POC Glucose Once [[id number]]  (Abnormal) Collected: 09/27/24 1731    Specimen: Blood Updated: 09/27/24 1733     Glucose 126 mg/dL      Comment: Serial Number: [id number]:  [id number]       POC Glucose Once [[id number]]  (Abnormal) Collected: 09/27/24 1626    Specimen: Blood Updated: 09/27/24 1628     Glucose 159 mg/dL      Comment: Serial Number: [id number]:  [id number]       POC Glucose Once [[id number]]  (Abnormal) Collected: 09/27/24 1520    Specimen: Blood Updated: 09/27/24 1521     Glucose 165 mg/dL      Comment: Serial Number: [id number]:  [id number]       POC Glucose Once [[id number]]  (Abnormal) Collected: 09/27/24 1412    Specimen: Blood Updated: 09/27/24 1413     Glucose 245 mg/dL      Comment: Serial Number: [id number]:  [id number]       POC  Glucose Once [[id number]]  (Abnormal) Collected: 09/27/24 1301    Specimen: Blood Updated: 09/27/24 1302     Glucose 171 mg/dL      Comment: Serial Number: [id number]:  [id number]       POC Glucose Once [[id number]]  (Abnormal) Collected: 09/27/24 1157    Specimen: Blood Updated: 09/27/24 1159     Glucose 135 mg/dL      Comment: Serial Number: [id number]:  [id number]       POC Glucose Once [[id number]]  (Abnormal) Collected: 09/27/24 1059    Specimen: Blood Updated: 09/27/24 1102     Glucose 154 mg/dL      Comment: Serial Number: [id number]:  [id number]       POC Glucose Once [[id number]]  (Abnormal) Collected: 09/27/24 0651    Specimen: Blood Updated: 09/27/24 1102     Glucose 162 mg/dL      Comment: Serial Number: [id number]:  [id number]       POC Glucose Once [[id number]]  (Abnormal) Collected: 09/27/24 1047    Specimen: Blood Updated: 09/27/24 1049     Glucose 165 mg/dL      Comment: Serial Number: [id number]:  [id number]               Imaging Results (Last 24 Hours)       Procedure Component Value Units Date/Time    XR Chest 1 View [[id number]] Collected: 09/28/24 0950     Updated: 09/28/24 1001    Narrative:      XR CHEST 1 VW    Date of Exam: 9/28/2024 4:00 AM EDT    Indication: Post-Op Heart Surgery    Comparison: September 27, 2024, and prior.    Findings:  There appears to be a moderate to large left pneumothorax which is an interval change. Cashion-Ari catheter has been removed. There appear to be mediastinal drains. No significant right pneumothorax or pleural effusion. Heart size appears unchanged. Status   post median sternotomy and CABG. There is suspected atelectasis in the left lung. No definite the right lung infiltrate.      Impression:      Impression:  1.Moderate to large left pneumothorax. Chest tube placement is recommended.  2.Suspected atelectasis in the left lung.  3.Removal of Cashion-Ari catheter.    Results were called to the patient's nurse in the [facility] by Dr. Benavidez at  9/28/2024 9:59 AM EDT. Patient's providers will be notified immediately.        Electronically Signed: Tanner Benavidez    9/28/2024 9:59 AM EDT    Workstation ID: [id number]            EKG      I personally viewed and interpreted the patient's EKG/Telemetry data:    ECHOCARDIOGRAM:  Results for orders placed in visit on 09/26/24    Intra-Op Anesthesia EDER    Narrative  Intra-Op Anesthesia EDER    Procedure Performed: Intra-Op Anesthesia EDER  Start Time:  End Time:    Preanesthesia Checklist:  Patient identified, IV assessed, risks and benefits discussed, monitors and equipment assessed, procedure being performed at surgeon's request and anesthesia consent obtained.    General Procedure Information  EDER Placed for monitoring purposes only -- This is not a diagnostic EDER  Diagnostic Indications for Echo:  hemodynamic monitoring  Location performed:  OR  Intubated  Bite block placed  Heart visualized  Probe Insertion:  Easy  Probe Type:  Biplane  Modalities:  Color flow mapping, continuous wave Doppler and 2D only    Echocardiographic and Doppler Measurements    Ventricles    Right Ventricle:  Cavity size normal.  Hypertrophy not present.  Global function normal.  Left Ventricle:  Cavity size normal.  Global Function normal.  Ejection Fraction 55%.        Valves    Aortic Valve:  Regurgitation absent.  Leaflet motions normal.    Mitral Valve:  Annulus normal.  Regurgitation trace.  Leaflets normal.  Leaflet motions normal.    Tricuspid Valve:  Regurgitation absent.  Leaflet motions normal.      Aorta    Ascending Aorta:  Size normal.  Mobile plaque not present.  Aortic Arch:  Size normal.  Mobile plaque not present.  Descending Aorta:  Size normal.  Mobile plaque not present.      Atria    Right Atrium:  Size normal.  Left Atrium:  Size normal.                Anesthesia Information  Performed Personally  Anesthesiologist:  Benson Fisher MD      Echocardiogram Comments:  Pre CPB:         Trace MR, EF 55%  Post CPB:    no  change       STRESS MYOVIEW:  Results for orders placed during the hospital encounter of 08/20/24    Stress Test With Myocardial Perfusion - One Day    Interpretation Summary    Impressions are consistent with a high risk study.    Left ventricular ejection fraction is mildly reduced (Calculated EF = 43%).    Myocardial perfusion imaging indicates a large-sized, severe area of ischemia located in the inferior wall and lateral wall.       CARDIAC CATHETERIZATION:  Results for orders placed during the hospital encounter of 08/30/24    Cardiac Catheterization/Vascular Study       OTHER:     Tmax is 98 pulse is 90 respirations are 20 blood pressure is 140/74 sats are 94%  Sodium is 141 potassium is 4.1 creatinine 0.8 hemoglobin is 11    Assessment & Plan     Coronary artery disease  Patient underwent redo coronary bypass surgery with SVG to the PDA OM and diagonal branches but have also has a LIMA from the previous surgery which is patent to the LAD  Hypertension  Patient blood pressure currently stable and is on oral medicines  Hyperlipidemia  Patient patient is started on statins  Diabetes  Patient is on insulin and will be managed by the primary care doctor.  Current medications include aspirin 81 mg p.o. once a day patient is on Tricor 145 mg p.o. once a day metoprolol 50 mg p.o. twice daily patient is on Crestor 40 mg p.o. once a day patient is currently on Lovenox for DVT prophylaxis  Postop day 2  Redo CABG with a LIMA to the LAD vein graft to the distal RCA vein graft to the first diagonal  Normal LV systolic function  Patient is currently clinically stable  Continue current medical therapy  Patient is currently clinically hemodynamically stable  Further recommendation based on patient course        Azael Johnson MD  09/28/24  10:44 EDT

## 2024-09-28 NOTE — PROGRESS NOTES
" LOS: 2 days   Patient Care Team:  Prabhu Downing MD as PCP - General  Prabhu Downing MD as PCP - Family Medicine  Ham Jacinto MD as Consulting Physician (Cardiology)    Chief Complaint:       Subjective      Vital Signs  Temp:  [97.6 °F (36.4 °C)-99 °F (37.2 °C)] 97.6 °F (36.4 °C)  Heart Rate:  [] 101  Resp:  [21-29] 25  BP: (113-144)/(61-83) 136/76  Body mass index is 26.31 kg/m².    Intake/Output Summary (Last 24 hours) at 9/28/2024 0909  Last data filed at 9/28/2024 0833  Gross per 24 hour   Intake 1715 ml   Output 3025 ml   Net -1310 ml     I/O this shift:  In: -   Out: 250 [Urine:250]      Wt Readings from Last 3 Encounters:   09/28/24 88 kg (194 lb)   09/23/24 85.3 kg (188 lb)   08/30/24 83.9 kg (185 lb)         Objective:  Vital signs: (most recent): Blood pressure 136/76, pulse 101, temperature 97.6 °F (36.4 °C), temperature source Oral, resp. rate 25, height 182.9 cm (72.01\"), weight 88 kg (194 lb), SpO2 90%.                Results Review:        WBC No results found for: \"WBCS\"   HGB Hemoglobin   Date Value Ref Range Status   09/28/2024 11.0 (L) 13.0 - 17.7 g/dL Final   09/27/2024 8.5 (L) 13.0 - 17.7 g/dL Final     Comment:     Result checked     09/26/2024 10.9 (L) 12.0 - 17.0 g/dL Final   09/26/2024 11.6 (L) 12.0 - 17.0 g/dL Final   09/26/2024 11.2 (L) 13.0 - 17.7 g/dL Final   09/26/2024 11.8 (L) 12.0 - 17.0 g/dL Final   09/26/2024 11.7 (L) 12.0 - 17.0 g/dL Final   09/26/2024 11.4 (L) 12.0 - 17.0 g/dL Final   09/26/2024 9.6 (L) 13.0 - 17.7 g/dL Final   09/26/2024 10.2 (L) 12.0 - 17.0 g/dL Final   09/26/2024 8.5 (L) 13.0 - 17.7 g/dL Final   09/26/2024 11.2 (L) 12.0 - 17.0 g/dL Final   09/26/2024 11.2 (L) 12.0 - 17.0 g/dL Final   09/26/2024 11.6 (L) 12.0 - 17.0 g/dL Final   09/26/2024 11.2 (L) 12.0 - 17.0 g/dL Final   09/26/2024 13.9 12.0 - 17.0 g/dL Final      HCT Hematocrit   Date Value Ref Range Status   09/28/2024 36.0 (L) 37.5 - 51.0 % Final   09/27/2024 26.9 (L) 37.5 - 51.0 % Final " "  09/26/2024 32 (L) 38 - 51 % Final   09/26/2024 34 (L) 38 - 51 % Final   09/26/2024 34.4 (L) 37.5 - 51.0 % Final   09/26/2024 35 (L) 38 - 51 % Final   09/26/2024 35 (L) 38 - 51 % Final   09/26/2024 33 (L) 38 - 51 % Final   09/26/2024 29.1 (L) 37.5 - 51.0 % Final   09/26/2024 30 (L) 38 - 51 % Final   09/26/2024 26.0 (L) 37.5 - 51.0 % Final   09/26/2024 33 (L) 38 - 51 % Final   09/26/2024 33 (L) 38 - 51 % Final   09/26/2024 34 (L) 38 - 51 % Final   09/26/2024 33 (L) 38 - 51 % Final   09/26/2024 41 38 - 51 % Final      Platelets No results found for: \"LABPLAT\"     PT/INR:    Protime   Date Value Ref Range Status   09/27/2024 15.0 (H) 9.6 - 11.7 Seconds Final   09/26/2024 13.3 (H) 9.6 - 11.7 Seconds Final   09/26/2024 13.6 (H) 9.6 - 11.7 Seconds Final   /  INR   Date Value Ref Range Status   09/27/2024 1.41 (H) 0.93 - 1.10 Final   09/26/2024 1.24 (H) 0.93 - 1.10 Final   09/26/2024 1.27 (H) 0.93 - 1.10 Final       Sodium Sodium   Date Value Ref Range Status   09/28/2024 141 136 - 145 mmol/L Final   09/27/2024 143 136 - 145 mmol/L Final   09/26/2024 139 136 - 145 mmol/L Final      Potassium Potassium   Date Value Ref Range Status   09/28/2024 4.1 3.5 - 5.2 mmol/L Final   09/27/2024 4.3 3.5 - 5.2 mmol/L Final   09/26/2024 3.6 3.5 - 5.2 mmol/L Final   09/26/2024 4.1 3.5 - 5.2 mmol/L Final      Chloride Chloride   Date Value Ref Range Status   09/28/2024 106 98 - 107 mmol/L Final   09/27/2024 113 (H) 98 - 107 mmol/L Final   09/26/2024 105 98 - 107 mmol/L Final      Bicarbonate No results found for: \"PLASMABICARB\"   BUN BUN   Date Value Ref Range Status   09/28/2024 13 6 - 20 mg/dL Final   09/27/2024 13 6 - 20 mg/dL Final   09/26/2024 15 6 - 20 mg/dL Final      Creatinine Creatinine   Date Value Ref Range Status   09/28/2024 0.89 0.76 - 1.27 mg/dL Final   09/27/2024 0.87 0.76 - 1.27 mg/dL Final   09/26/2024 0.76 0.60 - 1.30 mg/dL Final     Comment:     Serial Number: [id number]  [id number]   09/26/2024 0.98 0.76 - 1.27 mg/dL " Final      Calcium Calcium   Date Value Ref Range Status   09/28/2024 8.6 8.6 - 10.5 mg/dL Final   09/27/2024 8.7 8.6 - 10.5 mg/dL Final   09/26/2024 8.9 8.6 - 10.5 mg/dL Final      Magnesium Magnesium   Date Value Ref Range Status   09/27/2024 1.8 1.6 - 2.6 mg/dL Final         .imag    aspirin, 81 mg, Oral, Daily  chlorhexidine, 15 mL, Mouth/Throat, Q12H  enoxaparin, 40 mg, Subcutaneous, Daily  fenofibrate, 145 mg, Oral, Daily  furosemide, 40 mg, Intravenous, Once  metoprolol tartrate, 25 mg, Oral, Once  metoprolol tartrate, 50 mg, Oral, Q12H  mupirocin, , Each Nare, BID  pantoprazole, 40 mg, Oral, Q AM  polyethylene glycol, 17 g, Oral, BID  rosuvastatin, 40 mg, Oral, Nightly  senna-docusate sodium, 2 tablet, Oral, BID      insulin, 0-100 Units/hr, Last Rate: 2.2 Units/hr (09/28/24 0642)  sodium chloride, 30 mL/hr, Last Rate: 30 mL/hr (09/26/24 1151)            Coronary artery disease    Coronary artery disease of bypass graft of native heart with stable angina pectoris      Assessment & Plan    - MV CAD, hx off pump CABG x3 with LIMA to LAD, SVG to distal RCA, SVG to 1st diagonal ([facility], 6/2011), EF 50-55% (echo)--s/p reop CABG no op note available at this time ([facility])  - HTN--stable  - HLD--statin/ fenofibrate, cholestyramine  - DM type 2--followed by Dr. Mendenhall, on Jardiance/ metformin/ U-500 insulin  - Hypertriglyceridemia with hx pancreatitis--last trigly 1056 (3/2024)  - Postop ABLA, expected--watch closely  - Postop TCP, consumptive--watch closely     POD# 2  Doing well, ARMEN Dorsey on asa/statin/bb.  Increase Lopressor to 50 (he takes 75 at home).  Repeat e-.  Digrahame.  Endo consulted for DM mmgt.  Mobilize.  ARMEN Dorsey.      Anticipate home at discharge    Toby Harden MD  09/28/24  09:09 EDT

## 2024-09-28 NOTE — PROGRESS NOTES
Daily Progress Note    Patient Care Team:  Prabhu Downing MD as PCP - General  Prabhu Downing MD as PCP - Family Medicine  Ham Jacinto MD as Consulting Physician (Cardiology)    Chief Complaint: Follow-up type 2 diabetes    HPI: Patient seen, clinically doing well.  Eating fair.  Currently on IV insulin.        ROS:   Constitutional:  Denies fatigue, tiredness.    Respiratory: denies cough, shortness of breath.   Cardiovascular:  denies chest pain, edema   GI:  Denies abdominal pain, nausea, vomiting.         Vitals:    09/28/24 1200   BP:    Pulse:    Resp: (!) 29   Temp: 98 °F (36.7 °C)   SpO2:      Body mass index is 26.31 kg/m².    Physical Exam:  GEN: NAD, conversant  PSYCH: Awake and coherent      Results Review:     I reviewed the patient's new clinical results.    Glucose   Date Value Ref Range Status   09/28/2024 119 (H) 65 - 99 mg/dL Final     Sodium   Date Value Ref Range Status   09/28/2024 141 136 - 145 mmol/L Final     Potassium   Date Value Ref Range Status   09/28/2024 4.1 3.5 - 5.2 mmol/L Final     CO2   Date Value Ref Range Status   09/28/2024 26.3 22.0 - 29.0 mmol/L Final     Chloride   Date Value Ref Range Status   09/28/2024 106 98 - 107 mmol/L Final     Anion Gap   Date Value Ref Range Status   09/28/2024 8.7 5.0 - 15.0 mmol/L Final     Creatinine   Date Value Ref Range Status   09/28/2024 0.89 0.76 - 1.27 mg/dL Final   09/26/2024 0.76 0.60 - 1.30 mg/dL Final     Comment:     Serial Number: [id number]:  [id number]     BUN   Date Value Ref Range Status   09/28/2024 13 6 - 20 mg/dL Final     BUN/Creatinine Ratio   Date Value Ref Range Status   09/28/2024 14.6 7.0 - 25.0 Final     Calcium   Date Value Ref Range Status   09/28/2024 8.6 8.6 - 10.5 mg/dL Final     Albumin   Date Value Ref Range Status   09/27/2024 4.5 3.5 - 5.2 g/dL Final     Magnesium   Date Value Ref Range Status   09/27/2024 1.8 1.6 - 2.6 mg/dL Final     Phosphorus   Date Value Ref Range Status   09/27/2024 4.1 2.5 - 4.5  mg/dL Final     Lab Results   Component Value Date    HGBA1C 8.70 (H) 03/04/2024    HGBA1C 8.9 (H) 02/10/2023    HGBA1C 9.6 (H) 06/17/2022     Lab Results   Component Value Date    MICROALBUR 2.1 03/04/2024     Results from last 7 days   Lab Units 09/28/24  1243 09/28/24  1133 09/28/24  0951 09/28/24  0832 09/28/24  0641 09/28/24  0522   GLUCOSE mg/dL 161* 220* 221* 184* 169* 154*             Medication Review: Reviewed.     aspirin, 81 mg, Oral, Daily  chlorhexidine, 15 mL, Mouth/Throat, Q12H  [START ON 9/29/2024] enoxaparin, 40 mg, Subcutaneous, Daily  fenofibrate, 145 mg, Oral, Daily  furosemide, 40 mg, Intravenous, Once  lidocaine, 10 mL, Subcutaneous, Once  metoprolol tartrate, 50 mg, Oral, Q12H  mupirocin, , Each Nare, BID  pantoprazole, 40 mg, Oral, Q AM  polyethylene glycol, 17 g, Oral, BID  rosuvastatin, 40 mg, Oral, Nightly  senna-docusate sodium, 2 tablet, Oral, BID              Assessment and plan:  Diabetes mellitus type 2 with hyperglycemia: Uncontrolled, currently on IV insulin per CABG protocol.  Will start U-500 send 60 units subcu 3 times a day before each meal from tomorrow along with Humalog sliding scale follow blood sugars.  He also was on Jardiance at home which will be added later.    CAD: Status post CABG    Hypertriglyceridemia: Currently on fenofibrate with rosuvastatin.      Rosario Mendenhall MD. FACE

## 2024-09-29 ENCOUNTER — APPOINTMENT (OUTPATIENT)
Dept: GENERAL RADIOLOGY | Facility: HOSPITAL | Age: 59
End: 2024-09-29
Payer: COMMERCIAL

## 2024-09-29 LAB
ANION GAP SERPL CALCULATED.3IONS-SCNC: 6.2 MMOL/L (ref 5–15)
BH BB BLOOD EXPIRATION DATE: NORMAL
BH BB BLOOD TYPE BARCODE: 7300
BH BB DISPENSE STATUS: NORMAL
BH BB PRODUCT CODE: NORMAL
BH BB UNIT NUMBER: NORMAL
BUN SERPL-MCNC: 16 MG/DL (ref 6–20)
BUN/CREAT SERPL: 24.6 (ref 7–25)
CALCIUM SPEC-SCNC: 8.7 MG/DL (ref 8.6–10.5)
CHLORIDE SERPL-SCNC: 101 MMOL/L (ref 98–107)
CO2 SERPL-SCNC: 32.8 MMOL/L (ref 22–29)
CREAT SERPL-MCNC: 0.65 MG/DL (ref 0.76–1.27)
DEPRECATED RDW RBC AUTO: 50.9 FL (ref 37–54)
EGFRCR SERPLBLD CKD-EPI 2021: 108.5 ML/MIN/1.73
ERYTHROCYTE [DISTWIDTH] IN BLOOD BY AUTOMATED COUNT: 15.6 % (ref 12.3–15.4)
GLUCOSE BLDC GLUCOMTR-MCNC: 115 MG/DL (ref 70–105)
GLUCOSE BLDC GLUCOMTR-MCNC: 127 MG/DL (ref 70–105)
GLUCOSE BLDC GLUCOMTR-MCNC: 128 MG/DL (ref 70–105)
GLUCOSE BLDC GLUCOMTR-MCNC: 129 MG/DL (ref 70–105)
GLUCOSE BLDC GLUCOMTR-MCNC: 144 MG/DL (ref 70–105)
GLUCOSE BLDC GLUCOMTR-MCNC: 147 MG/DL (ref 70–105)
GLUCOSE BLDC GLUCOMTR-MCNC: 241 MG/DL (ref 70–105)
GLUCOSE BLDC GLUCOMTR-MCNC: 256 MG/DL (ref 70–105)
GLUCOSE BLDC GLUCOMTR-MCNC: 298 MG/DL (ref 70–105)
GLUCOSE BLDC GLUCOMTR-MCNC: 300 MG/DL (ref 70–105)
GLUCOSE SERPL-MCNC: 114 MG/DL (ref 65–99)
HCT VFR BLD AUTO: 31.2 % (ref 37.5–51)
HGB BLD-MCNC: 9.7 G/DL (ref 13–17.7)
MCH RBC QN AUTO: 27.7 PG (ref 26.6–33)
MCHC RBC AUTO-ENTMCNC: 31.1 G/DL (ref 31.5–35.7)
MCV RBC AUTO: 89.1 FL (ref 79–97)
PLATELET # BLD AUTO: 74 10*3/MM3 (ref 140–450)
PMV BLD AUTO: 10.5 FL (ref 6–12)
POTASSIUM SERPL-SCNC: 4 MMOL/L (ref 3.5–5.2)
RBC # BLD AUTO: 3.5 10*6/MM3 (ref 4.14–5.8)
SODIUM SERPL-SCNC: 140 MMOL/L (ref 136–145)
UNIT  ABO: NORMAL
UNIT  RH: NORMAL
WBC NRBC COR # BLD AUTO: 6.48 10*3/MM3 (ref 3.4–10.8)

## 2024-09-29 PROCEDURE — 63710000001 INSULIN LISPRO (HUMAN) PER 5 UNITS: Performed by: INTERNAL MEDICINE

## 2024-09-29 PROCEDURE — 99232 SBSQ HOSP IP/OBS MODERATE 35: CPT | Performed by: INTERNAL MEDICINE

## 2024-09-29 PROCEDURE — 97530 THERAPEUTIC ACTIVITIES: CPT

## 2024-09-29 PROCEDURE — 82948 REAGENT STRIP/BLOOD GLUCOSE: CPT | Performed by: INTERNAL MEDICINE

## 2024-09-29 PROCEDURE — 82948 REAGENT STRIP/BLOOD GLUCOSE: CPT

## 2024-09-29 PROCEDURE — 97116 GAIT TRAINING THERAPY: CPT

## 2024-09-29 PROCEDURE — 71045 X-RAY EXAM CHEST 1 VIEW: CPT

## 2024-09-29 PROCEDURE — 85027 COMPLETE CBC AUTOMATED: CPT | Performed by: NURSE PRACTITIONER

## 2024-09-29 PROCEDURE — 63710000001 INSULIN REGULAR HUMAN (CONC) 500 UNIT/ML SOLUTION PEN-INJECTOR 3 ML PEN: Performed by: INTERNAL MEDICINE

## 2024-09-29 PROCEDURE — 80048 BASIC METABOLIC PNL TOTAL CA: CPT | Performed by: NURSE PRACTITIONER

## 2024-09-29 RX ORDER — ENOXAPARIN SODIUM 100 MG/ML
40 INJECTION SUBCUTANEOUS DAILY
Status: DISCONTINUED | OUTPATIENT
Start: 2024-09-30 | End: 2024-09-30

## 2024-09-29 RX ORDER — FUROSEMIDE 40 MG
40 TABLET ORAL DAILY
Status: DISCONTINUED | OUTPATIENT
Start: 2024-09-29 | End: 2024-10-02 | Stop reason: HOSPADM

## 2024-09-29 RX ADMIN — SENNOSIDES AND DOCUSATE SODIUM 2 TABLET: 50; 8.6 TABLET ORAL at 20:37

## 2024-09-29 RX ADMIN — HYDROCODONE BITARTRATE AND ACETAMINOPHEN 1 TABLET: 5; 325 TABLET ORAL at 12:53

## 2024-09-29 RX ADMIN — MUPIROCIN 1 APPLICATION: 20 OINTMENT TOPICAL at 08:50

## 2024-09-29 RX ADMIN — FUROSEMIDE 40 MG: 40 TABLET ORAL at 12:53

## 2024-09-29 RX ADMIN — POLYETHYLENE GLYCOL 3350 17 G: 17 POWDER, FOR SOLUTION ORAL at 08:39

## 2024-09-29 RX ADMIN — POLYETHYLENE GLYCOL 3350 17 G: 17 POWDER, FOR SOLUTION ORAL at 20:36

## 2024-09-29 RX ADMIN — MUPIROCIN 1 APPLICATION: 20 OINTMENT TOPICAL at 20:36

## 2024-09-29 RX ADMIN — METOPROLOL TARTRATE 50 MG: 50 TABLET, FILM COATED ORAL at 08:39

## 2024-09-29 RX ADMIN — METOPROLOL TARTRATE 75 MG: 50 TABLET, FILM COATED ORAL at 20:37

## 2024-09-29 RX ADMIN — HYDROCODONE BITARTRATE AND ACETAMINOPHEN 1 TABLET: 5; 325 TABLET ORAL at 08:38

## 2024-09-29 RX ADMIN — INSULIN LISPRO 8 UNITS: 100 INJECTION, SOLUTION INTRAVENOUS; SUBCUTANEOUS at 12:52

## 2024-09-29 RX ADMIN — HYDROCODONE BITARTRATE AND ACETAMINOPHEN 1 TABLET: 5; 325 TABLET ORAL at 17:47

## 2024-09-29 RX ADMIN — CHLORHEXIDINE GLUCONATE, 0.12% ORAL RINSE 15 ML: 1.2 SOLUTION DENTAL at 08:39

## 2024-09-29 RX ADMIN — ROSUVASTATIN 40 MG: 10 TABLET, FILM COATED ORAL at 20:37

## 2024-09-29 RX ADMIN — CYCLOBENZAPRINE 5 MG: 10 TABLET, FILM COATED ORAL at 22:05

## 2024-09-29 RX ADMIN — HYDROCODONE BITARTRATE AND ACETAMINOPHEN 1 TABLET: 5; 325 TABLET ORAL at 22:05

## 2024-09-29 RX ADMIN — PANTOPRAZOLE SODIUM 40 MG: 40 TABLET, DELAYED RELEASE ORAL at 05:44

## 2024-09-29 RX ADMIN — FENOFIBRATE 145 MG: 145 TABLET ORAL at 08:39

## 2024-09-29 RX ADMIN — SENNOSIDES AND DOCUSATE SODIUM 2 TABLET: 50; 8.6 TABLET ORAL at 08:39

## 2024-09-29 RX ADMIN — INSULIN HUMAN 60 UNITS: 500 INJECTION, SOLUTION SUBCUTANEOUS at 08:00

## 2024-09-29 RX ADMIN — INSULIN LISPRO 12 UNITS: 100 INJECTION, SOLUTION INTRAVENOUS; SUBCUTANEOUS at 17:47

## 2024-09-29 RX ADMIN — CHLORHEXIDINE GLUCONATE, 0.12% ORAL RINSE 15 ML: 1.2 SOLUTION DENTAL at 20:36

## 2024-09-29 RX ADMIN — ASPIRIN 81 MG: 81 TABLET, COATED ORAL at 08:39

## 2024-09-29 NOTE — PLAN OF CARE
Assessment: Rodney Chaney presents with functional mobility impairments which indicate the need for skilled intervention. Tolerating session today without incident. Patient does very well with mobility, still sl slow, guarded bisi but no LOB noted. Didn't do stairs due to new CT. Will need to practice stairs before dc. Will continue to follow and progress as tolerated.

## 2024-09-29 NOTE — THERAPY TREATMENT NOTE
Subjective: Pt agreeable to therapeutic plan of care. Pt now has a new CT due to pneumothorax    Objective:     Precautions - falls, sternal    Bed mobility - N/A or Not attempted.  Transfers - CGA and with rolling walker  Ambulation - 200 feet SBA and with rolling walker    Vitals: WNL    Pain:  some pain at incision  Intervention for pain: RN provided medication, Increased Activity, and Therapeutic Presence    Education: Provided education on the importance of mobility in the acute care setting, Verbal/Tactile Cues, Transfer Training, Gait Training, and Post-Op Precautions    Assessment: Rodney Chaney presents with functional mobility impairments which indicate the need for skilled intervention. Tolerating session today without incident. Patient does very well with mobility, still sl slow, guarded bisi but no LOB noted. Didn't do stairs due to new CT. Will need to practice stairs before dc. Will continue to follow and progress as tolerated.     Plan/Recommendations:   If medically appropriate, No ongoing therapy recommended post-acute care. No therapy needs. Pt requires no DME at discharge.     Pt desires Home with family assist at discharge. Pt cooperative; agreeable to therapeutic recommendations and plan of care.         Basic Mobility 6-click:  Rollin = Total, A lot = 2, A little = 3; 4 = None  Supine>Sit:   1 = Total, A lot = 2, A little = 3; 4 = None   Sit>Stand with arms:  1 = Total, A lot = 2, A little = 3; 4 = None  Bed>Chair:   1 = Total, A lot = 2, A little = 3; 4 = None  Ambulate in room:  1 = Total, A lot = 2, A little = 3; 4 = None  3-5 Steps with railin = Total, A lot = 2, A little = 3; 4 = None  Score: 18    Post-Tx Position: Up in Chair and Call light and personal items within reach  PPE: gloves

## 2024-09-29 NOTE — PROGRESS NOTES
" LOS: 3 days   Patient Care Team:  Prabhu Downing MD as PCP - General  Prabhu Downing MD as PCP - Family Medicine  Ham Jacinto MD as Consulting Physician (Cardiology)    Chief Complaint:       Subjective      Vital Signs  Temp:  [97.6 °F (36.4 °C)-98.7 °F (37.1 °C)] 98 °F (36.7 °C)  Heart Rate:  [] 82  Resp:  [15-29] 22  BP: (110-160)/(64-83) 133/71  Body mass index is 26.31 kg/m².    Intake/Output Summary (Last 24 hours) at 9/29/2024 0915  Last data filed at 9/29/2024 0549  Gross per 24 hour   Intake 1022 ml   Output 2654 ml   Net -1632 ml     No intake/output data recorded.      Wt Readings from Last 3 Encounters:   09/28/24 88 kg (194 lb)   09/23/24 85.3 kg (188 lb)   08/30/24 83.9 kg (185 lb)         Objective:  Vital signs: (most recent): Blood pressure 133/71, pulse 82, temperature 98 °F (36.7 °C), temperature source Oral, resp. rate 22, height 182.9 cm (72.01\"), weight 88 kg (194 lb), SpO2 97%.                Results Review:        WBC No results found for: \"WBCS\"   HGB Hemoglobin   Date Value Ref Range Status   09/29/2024 9.7 (L) 13.0 - 17.7 g/dL Final   09/28/2024 11.0 (L) 13.0 - 17.7 g/dL Final   09/27/2024 8.5 (L) 13.0 - 17.7 g/dL Final     Comment:     Result checked     09/26/2024 10.9 (L) 12.0 - 17.0 g/dL Final   09/26/2024 11.6 (L) 12.0 - 17.0 g/dL Final   09/26/2024 11.2 (L) 13.0 - 17.7 g/dL Final   09/26/2024 11.8 (L) 12.0 - 17.0 g/dL Final   09/26/2024 11.7 (L) 12.0 - 17.0 g/dL Final   09/26/2024 11.4 (L) 12.0 - 17.0 g/dL Final   09/26/2024 9.6 (L) 13.0 - 17.7 g/dL Final   09/26/2024 10.2 (L) 12.0 - 17.0 g/dL Final   09/26/2024 8.5 (L) 13.0 - 17.7 g/dL Final   09/26/2024 11.2 (L) 12.0 - 17.0 g/dL Final   09/26/2024 11.2 (L) 12.0 - 17.0 g/dL Final      HCT Hematocrit   Date Value Ref Range Status   09/29/2024 31.2 (L) 37.5 - 51.0 % Final   09/28/2024 36.0 (L) 37.5 - 51.0 % Final   09/27/2024 26.9 (L) 37.5 - 51.0 % Final   09/26/2024 32 (L) 38 - 51 % Final   09/26/2024 34 (L) 38 - 51 % Final " "  09/26/2024 34.4 (L) 37.5 - 51.0 % Final   09/26/2024 35 (L) 38 - 51 % Final   09/26/2024 35 (L) 38 - 51 % Final   09/26/2024 33 (L) 38 - 51 % Final   09/26/2024 29.1 (L) 37.5 - 51.0 % Final   09/26/2024 30 (L) 38 - 51 % Final   09/26/2024 26.0 (L) 37.5 - 51.0 % Final   09/26/2024 33 (L) 38 - 51 % Final   09/26/2024 33 (L) 38 - 51 % Final      Platelets No results found for: \"LABPLAT\"     PT/INR:    Protime   Date Value Ref Range Status   09/27/2024 15.0 (H) 9.6 - 11.7 Seconds Final   09/26/2024 13.3 (H) 9.6 - 11.7 Seconds Final   09/26/2024 13.6 (H) 9.6 - 11.7 Seconds Final   /  INR   Date Value Ref Range Status   09/27/2024 1.41 (H) 0.93 - 1.10 Final   09/26/2024 1.24 (H) 0.93 - 1.10 Final   09/26/2024 1.27 (H) 0.93 - 1.10 Final       Sodium Sodium   Date Value Ref Range Status   09/29/2024 140 136 - 145 mmol/L Final   09/28/2024 141 136 - 145 mmol/L Final   09/27/2024 143 136 - 145 mmol/L Final   09/26/2024 139 136 - 145 mmol/L Final      Potassium Potassium   Date Value Ref Range Status   09/29/2024 4.0 3.5 - 5.2 mmol/L Final   09/28/2024 4.1 3.5 - 5.2 mmol/L Final   09/27/2024 4.3 3.5 - 5.2 mmol/L Final   09/26/2024 3.6 3.5 - 5.2 mmol/L Final   09/26/2024 4.1 3.5 - 5.2 mmol/L Final      Chloride Chloride   Date Value Ref Range Status   09/29/2024 101 98 - 107 mmol/L Final   09/28/2024 106 98 - 107 mmol/L Final   09/27/2024 113 (H) 98 - 107 mmol/L Final   09/26/2024 105 98 - 107 mmol/L Final      Bicarbonate No results found for: \"PLASMABICARB\"   BUN BUN   Date Value Ref Range Status   09/29/2024 16 6 - 20 mg/dL Final   09/28/2024 13 6 - 20 mg/dL Final   09/27/2024 13 6 - 20 mg/dL Final   09/26/2024 15 6 - 20 mg/dL Final      Creatinine Creatinine   Date Value Ref Range Status   09/29/2024 0.65 (L) 0.76 - 1.27 mg/dL Final   09/28/2024 0.89 0.76 - 1.27 mg/dL Final   09/27/2024 0.87 0.76 - 1.27 mg/dL Final   09/26/2024 0.76 0.60 - 1.30 mg/dL Final     Comment:     Serial Number: [id number]:  [id number] "   09/26/2024 0.98 0.76 - 1.27 mg/dL Final      Calcium Calcium   Date Value Ref Range Status   09/29/2024 8.7 8.6 - 10.5 mg/dL Final   09/28/2024 8.6 8.6 - 10.5 mg/dL Final   09/27/2024 8.7 8.6 - 10.5 mg/dL Final   09/26/2024 8.9 8.6 - 10.5 mg/dL Final      Magnesium Magnesium   Date Value Ref Range Status   09/27/2024 1.8 1.6 - 2.6 mg/dL Final         .imag    aspirin, 81 mg, Oral, Daily  chlorhexidine, 15 mL, Mouth/Throat, Q12H  enoxaparin, 40 mg, Subcutaneous, Daily  fenofibrate, 145 mg, Oral, Daily  insulin lispro, 4-24 Units, Subcutaneous, TID With Meals  Insulin Regular Human (Conc), 60 Units, Subcutaneous, TID AC  metoprolol tartrate, 50 mg, Oral, Q12H  mupirocin, , Each Nare, BID  pantoprazole, 40 mg, Oral, Q AM  polyethylene glycol, 17 g, Oral, BID  rosuvastatin, 40 mg, Oral, Nightly  senna-docusate sodium, 2 tablet, Oral, BID      insulin, 0-100 Units/hr, Last Rate: Stopped (09/29/24 0848)  sodium chloride, 30 mL/hr, Last Rate: 30 mL/hr (09/26/24 1151)            Coronary artery disease    Coronary artery disease of bypass graft of native heart with stable angina pectoris      Assessment & Plan    - MV CAD, hx off pump CABG x3 with LIMA to LAD, SVG to distal RCA, SVG to 1st diagonal [facility] EF 50-55% (echo)--s/p reop CABG no op note available at this time ([facility])  - HTN--stable  - HLD--statin/ fenofibrate, cholestyramine  - DM type 2--followed by Dr. Mendenhall, on Jardiance/ metformin/ U-500 insulin  - Hypertriglyceridemia with hx pancreatitis--last trigly 1056 (3/2024)  - Postop ABLA, expected--watch closely  - Postop TCP, consumptive--watch closely     POD# 3  Doing well. Had a left pneumothorax yesterday. CT placed. No CXR today yet. On asa/statin/bb.  Increase Lopressor to 75 (he takes 75 at home).  Repeat  Diurese.  Endo consulted for DM mmgt.  Mobilize. Platelets low. Lasix PO. Anticipate home at discharge    Toby Harden MD  09/29/24  09:15 EDT

## 2024-09-29 NOTE — PROGRESS NOTES
CARDIOLOGY PROGRESS NOTE:    Rodney Chaney  59 y.o.  male  1965  [id number]      Referring Provider: Cardiac surgeon    Reason for follow-up: Status post coronary artery bypass surgery     Patient Care Team:  Prabhu Downing MD as PCP - General  Prabhu Downing MD as PCP - Family Medicine  Ham Jacinto MD as Consulting Physician (Cardiology)    Subjective .  Patient extubated and sitting in chair comfortably without any symptoms    Objective sitting in chair comfortably     Review of Systems   Constitutional: Negative for fever and malaise/fatigue.   HENT:  Negative for ear pain and nosebleeds.    Eyes:  Negative for blurred vision and double vision.   Cardiovascular:  Negative for chest pain, dyspnea on exertion and palpitations.   Respiratory:  Negative for cough and shortness of breath.    Skin:  Negative for color change and rash.   Musculoskeletal:  Negative for back pain and joint pain.   Gastrointestinal:  Negative for abdominal pain, hematemesis, hematochezia, nausea and vomiting.   Genitourinary:  Negative for flank pain and hematuria.   Neurological:  Negative for focal weakness and headaches.   Psychiatric/Behavioral:  Negative for depression. The patient is not nervous/anxious.    All other systems reviewed and are negative.      Allergies: Levofloxacin    Scheduled Meds:aspirin, 81 mg, Oral, Daily  chlorhexidine, 15 mL, Mouth/Throat, Q12H  enoxaparin, 40 mg, Subcutaneous, Daily  fenofibrate, 145 mg, Oral, Daily  furosemide, 40 mg, Oral, Daily  insulin lispro, 4-24 Units, Subcutaneous, TID With Meals  Insulin Regular Human (Conc), 60 Units, Subcutaneous, TID AC  metoprolol tartrate, 75 mg, Oral, Q12H  mupirocin, , Each Nare, BID  pantoprazole, 40 mg, Oral, Q AM  polyethylene glycol, 17 g, Oral, BID  rosuvastatin, 40 mg, Oral, Nightly  senna-docusate sodium, 2 tablet, Oral, BID      Continuous Infusions:     PRN Meds:.  acetaminophen **OR** acetaminophen **OR** acetaminophen    Calcium Replacement -  "Follow Nurse / BPA Driven Protocol    cyclobenzaprine    dextrose    dextrose    glucagon (human recombinant)    HYDROcodone-acetaminophen    Magnesium Cardiology Dose Replacement - Follow Nurse / BPA Driven Protocol    [] Morphine **AND** naloxone    nitroglycerin    ondansetron    Phosphorus Replacement - Follow Nurse / BPA Driven Protocol    Potassium Replacement - Follow Nurse / BPA Driven Protocol        VITAL SIGNS  Vitals:    24 0900 24 0930 24 1000 24 1100   BP: 119/75  107/67 103/59   BP Location:    Right arm   Patient Position:    Lying   Pulse: 95 75 75 79   Resp:    25   Temp:    98 °F (36.7 °C)   TempSrc:    Oral   SpO2: 97% 100% 93% 90%   Weight:       Height:           Flowsheet Rows      Flowsheet Row First Filed Value   Admission Height 182.9 cm (72.01\") Documented at 2024 1250   Admission Weight 85.3 kg (188 lb 0.8 oz) Documented at 2024 1250             TELEMETRY: Sinus rhythm    Physical Exam:  Constitutional:       Appearance: Well-developed.   Eyes:      General: No scleral icterus.     Conjunctiva/sclera: Conjunctivae normal.      Pupils: Pupils are equal, round, and reactive to light.   HENT:      Head: Normocephalic and atraumatic.   Neck:      Thyroid: No thyromegaly.      Vascular: No carotid bruit or JVD.   Pulmonary:      Effort: Pulmonary effort is normal.      Breath sounds: Normal breath sounds. No wheezing. No rales.   Cardiovascular:      Normal rate. Regular rhythm.   Pulses:     Intact distal pulses.   Edema:     Peripheral edema absent.   Abdominal:      General: Bowel sounds are normal.      Palpations: Abdomen is soft.   Musculoskeletal: Normal range of motion.      Cervical back: Normal range of motion and neck supple. Skin:     General: Skin is warm and dry.      Findings: No rash.   Neurological:      Mental Status: Alert and oriented to person, place, and time.      Comments: No focal deficits          Results Review:   I reviewed " the patient's new clinical results.  Lab Results (last 24 hours)       Procedure Component Value Units Date/Time    POC Glucose 4x Daily Before Meals & at Bedtime [[id number]]  (Abnormal) Collected: 09/29/24 1137    Specimen: Blood Updated: 09/29/24 1139     Glucose 241 mg/dL      Comment: Serial Number: [id number]:  [id number]       POC Glucose Once [[id number]]  (Abnormal) Collected: 09/29/24 0547    Specimen: Blood Updated: 09/29/24 1131     Glucose 129 mg/dL      Comment: Serial Number: [id number]:  [id number]       POC Glucose 4x Daily Before Meals & at Bedtime [[id number]]  (Abnormal) Collected: 09/29/24 0749    Specimen: Blood Updated: 09/29/24 0751     Glucose 147 mg/dL      Comment: Serial Number: [id number]:  [id number]       POC Glucose Once [[id number]]  (Abnormal) Collected: 09/29/24 0438    Specimen: Blood Updated: 09/29/24 0547     Glucose 127 mg/dL      Comment: Serial Number: [id number]:  [id number]       Basic Metabolic Panel [[id number]]  (Abnormal) Collected: 09/29/24 0426    Specimen: Blood Updated: 09/29/24 0456     Glucose 114 mg/dL      BUN 16 mg/dL      Creatinine 0.65 mg/dL      Sodium 140 mmol/L      Potassium 4.0 mmol/L      Chloride 101 mmol/L      CO2 32.8 mmol/L      Calcium 8.7 mg/dL      BUN/Creatinine Ratio 24.6     Anion Gap 6.2 mmol/L      eGFR 108.5 mL/min/1.73     Narrative:      GFR Normal >60  Chronic Kidney Disease <60  Kidney Failure <15      CBC (No Diff) [[id number]]  (Abnormal) Collected: 09/29/24 0426    Specimen: Blood Updated: 09/29/24 0446     WBC 6.48 10*3/mm3      RBC 3.50 10*6/mm3      Hemoglobin 9.7 g/dL      Hematocrit 31.2 %      MCV 89.1 fL      MCH 27.7 pg      MCHC 31.1 g/dL      RDW 15.6 %      RDW-SD 50.9 fl      MPV 10.5 fL      Platelets 74 10*3/mm3     POC Glucose Once [[id number]]  (Abnormal) Collected: 09/29/24 0250    Specimen: Blood Updated: 09/29/24 0437     Glucose 128 mg/dL      Comment: Serial Number: [id number]:   [id number]       POC Glucose Once [[id number]]  (Abnormal) Collected: 09/29/24 0019    Specimen: Blood Updated: 09/29/24 0250     Glucose 144 mg/dL      Comment: Serial Number: [id number]:  [id number]       POC Glucose Once [[id number]]  (Abnormal) Collected: 09/29/24 0144    Specimen: Blood Updated: 09/29/24 0146     Glucose 115 mg/dL      Comment: Serial Number: [id number]:  [id number]       POC Glucose Once [[id number]]  (Abnormal) Collected: 09/28/24 2314    Specimen: Blood Updated: 09/28/24 2316     Glucose 156 mg/dL      Comment: Serial Number: [id number]:  [id number]       POC Glucose 4x Daily Before Meals & at Bedtime [[id number]]  (Abnormal) Collected: 09/28/24 2206    Specimen: Blood Updated: 09/28/24 2207     Glucose 174 mg/dL      Comment: Serial Number: [id number]:  [id number]       POC Glucose Once [[id number]]  (Abnormal) Collected: 09/28/24 2058    Specimen: Blood Updated: 09/28/24 2100     Glucose 200 mg/dL      Comment: Serial Number: [id number]:  [id number]       POC Glucose Once [[id number]]  (Abnormal) Collected: 09/28/24 1917    Specimen: Blood Updated: 09/28/24 1919     Glucose 131 mg/dL      Comment: Serial Number: [id number]:  [id number]       POC Glucose Once [[id number]]  (Abnormal) Collected: 09/28/24 1829    Specimen: Blood Updated: 09/28/24 1833     Glucose 107 mg/dL      Comment: Serial Number: [id number]:  [id number]       POC Glucose Once [[id number]]  (Abnormal) Collected: 09/28/24 1552    Specimen: Blood Updated: 09/28/24 1554     Glucose 200 mg/dL      Comment: Serial Number: [id number]:  [id number]       POC Glucose Once [[id number]]  (Abnormal) Collected: 09/28/24 1435    Specimen: Blood Updated: 09/28/24 1438     Glucose 220 mg/dL      Comment: Serial Number: [id number]:  [id number]       POC Glucose Once [[id number]]  (Abnormal) Collected: 09/28/24 1243    Specimen: Blood Updated: 09/28/24 1244     Glucose 161 mg/dL      Comment: Serial  Number: [id number]:  [id number]               Imaging Results (Last 24 Hours)       Procedure Component Value Units Date/Time    XR Chest 1 View [[id number]] Resulted: 09/29/24 0926     Updated: 09/29/24 0933    XR Chest 1 View [[id number]] Collected: 09/29/24 0240     Updated: 09/29/24 0250    Narrative:      Examination: XR CHEST 1 VW-     Date of Exam: 9/28/2024 7:48 PM     Indication: post chest tube removal, mediastinal;  I25.708-Atherosclerosis of coronary artery bypass graft(s), unspecified,  with other forms of angina pectoris.     Comparison: 9/28/2024.     Technique: Single radiographic view of the chest was obtained.     Findings:  Midline chest tubes are removed. Stable left-sided chest tube. No  evidence of a left-sided pneumothorax. There is a tiny right apical  pneumothorax with approximately 3 mm pleural separation. Lung volumes  remain low and there is stable bilateral perihilar and basilar  atelectasis. Slight stable asymmetric elevation of the left  hemidiaphragm. No definite pleural effusion. There is evidence of prior  median sternotomy and CABG. Cardiac silhouette and pulmonary vasculature  appear unchanged.       Impression:         1. Trace right apical pneumothorax.  2. Stable left-sided chest tube without evidence of left-sided  pneumothorax.  3. Persistent hypoinflation of the lungs with perihilar and basilar  atelectasis.  4. Interval removal of midline chest tubes.     Electronically Signed By-Jj Bright MD On:9/29/2024 2:48 AM       XR Chest 1 View [[id number]] Collected: 09/28/24 1252     Updated: 09/28/24 1257    Narrative:      XR CHEST 1 VW    Date of Exam: 9/28/2024 12:22 PM EDT    Indication: s/p ct insertion left    Comparison: September 28, 2024 at 4:05 a.m.    Findings:  Interval placement of left chest tube. There has been evacuation of the left pneumothorax. No significant pneumothorax is visible at this time. Reexpansion of the left lung. Mild bibasilar opacities,  likely atelectasis. No significant right pneumothorax   or new pleural effusion. Mediastinal drains are present. Heart size and mediastinal contour appear unchanged. Status post median sternotomy and CABG.      Impression:      Impression:  1.Interval placement of left chest tube with evacuation of left pneumothorax. No significant pneumothorax is visible at this time.  2.Mild bibasilar opacities, likely atelectasis.        Electronically Signed: Tanner Nascimentotyra    9/28/2024 12:55 PM EDT    Workstation ID: [id number]            EKG      I personally viewed and interpreted the patient's EKG/Telemetry data:    ECHOCARDIOGRAM:  Results for orders placed in visit on 09/26/24    Intra-Op Anesthesia EDER    Narrative  Intra-Op Anesthesia EDER    Procedure Performed: Intra-Op Anesthesia EDER  Start Time:  End Time:    Preanesthesia Checklist:  Patient identified, IV assessed, risks and benefits discussed, monitors and equipment assessed, procedure being performed at surgeon's request and anesthesia consent obtained.    General Procedure Information  EDER Placed for monitoring purposes only -- This is not a diagnostic EDER  Diagnostic Indications for Echo:  hemodynamic monitoring  Location performed:  OR  Intubated  Bite block placed  Heart visualized  Probe Insertion:  Easy  Probe Type:  Biplane  Modalities:  Color flow mapping, continuous wave Doppler and 2D only    Echocardiographic and Doppler Measurements    Ventricles    Right Ventricle:  Cavity size normal.  Hypertrophy not present.  Global function normal.  Left Ventricle:  Cavity size normal.  Global Function normal.  Ejection Fraction 55%.        Valves    Aortic Valve:  Regurgitation absent.  Leaflet motions normal.    Mitral Valve:  Annulus normal.  Regurgitation trace.  Leaflets normal.  Leaflet motions normal.    Tricuspid Valve:  Regurgitation absent.  Leaflet motions normal.      Aorta    Ascending Aorta:  Size normal.  Mobile plaque not present.  Aortic Arch:  Size  normal.  Mobile plaque not present.  Descending Aorta:  Size normal.  Mobile plaque not present.      Atria    Right Atrium:  Size normal.  Left Atrium:  Size normal.                Anesthesia Information  Performed Personally  Anesthesiologist:  Benson Fisher MD      Echocardiogram Comments:  Pre CPB:         Trace MR, EF 55%  Post CPB:    no change       STRESS MYOVIEW:  Results for orders placed during the hospital encounter of 08/20/24    Stress Test With Myocardial Perfusion - One Day    Interpretation Summary    Impressions are consistent with a high risk study.    Left ventricular ejection fraction is mildly reduced (Calculated EF = 43%).    Myocardial perfusion imaging indicates a large-sized, severe area of ischemia located in the inferior wall and lateral wall.       CARDIAC CATHETERIZATION:  Results for orders placed during the hospital encounter of 08/30/24    Cardiac Catheterization/Vascular Study       OTHER:   Denies any complaint  Denies any chest pain  Clinically Premcal stable  Maintaining sinus rhythm  Tmax is 98 pulse is 79 respirations are 24 blood pressure is 102/59 sats are 90%  Sodium is 140 potassium is 4.0 creatinine 0.6 hemoglobin is 9.7      Assessment & Plan     Coronary artery disease  Patient underwent redo coronary bypass surgery with SVG to the PDA OM and diagonal branches but have also has a LIMA from the previous surgery which is patent to the LAD  Hypertension  Patient blood pressure currently stable and is on oral medicines  Hyperlipidemia  Patient patient is started on statins  Diabetes  Patient is on insulin and will be managed by the primary care doctor.  Current medications include aspirin 81 mg p.o. once a day patient is on Tricor 145 mg p.o. once a day metoprolol 50 mg p.o. twice daily patient is on Crestor 40 mg p.o. once a day patient is currently on Lovenox for DVT prophylaxis  Postop day 3  Redo CABG with a LIMA to the LAD vein graft to the distal RCA vein graft to  the first diagonal  Normal LV systolic function  Patient is currently clinically stable  Continue current medical therapy  Patient is currently clinically hemodynamically stable  Further recommendation based on patient course        Azael Johnson MD  09/29/24  11:53 EDT

## 2024-09-29 NOTE — PROGRESS NOTES
Daily Progress Note    Patient Care Team:  Prabhu Downing MD as PCP - General  Prabhu Downing MD as PCP - Family Medicine  Ham Jacinto MD as Consulting Physician (Cardiology)    Chief Complaint: Follow-up type 2 diabetes    HPI: Patient seen, clinically doing well.  Off IV insulin.  Eating well.  Blood sugars are beginning to rise.    ROS:   Constitutional:  Denies fatigue, tiredness.    Respiratory: denies cough, shortness of breath.   Cardiovascular:  denies chest pain, edema   GI:  Denies abdominal pain, nausea, vomiting.         Vitals:    09/29/24 1100   BP: 103/59   Pulse: 79   Resp: 25   Temp: 98 °F (36.7 °C)   SpO2: 90%     Body mass index is 26.31 kg/m².    Physical Exam:  GEN: NAD, conversant  PSYCH: Awake and coherent      Results Review:     I reviewed the patient's new clinical results.    Glucose   Date Value Ref Range Status   09/29/2024 114 (H) 65 - 99 mg/dL Final     Sodium   Date Value Ref Range Status   09/29/2024 140 136 - 145 mmol/L Final     Potassium   Date Value Ref Range Status   09/29/2024 4.0 3.5 - 5.2 mmol/L Final     CO2   Date Value Ref Range Status   09/29/2024 32.8 (H) 22.0 - 29.0 mmol/L Final     Chloride   Date Value Ref Range Status   09/29/2024 101 98 - 107 mmol/L Final     Anion Gap   Date Value Ref Range Status   09/29/2024 6.2 5.0 - 15.0 mmol/L Final     Creatinine   Date Value Ref Range Status   09/29/2024 0.65 (L) 0.76 - 1.27 mg/dL Final   09/26/2024 0.76 0.60 - 1.30 mg/dL Final     Comment:     Serial Number: [id number]:  [id number]     BUN   Date Value Ref Range Status   09/29/2024 16 6 - 20 mg/dL Final     BUN/Creatinine Ratio   Date Value Ref Range Status   09/29/2024 24.6 7.0 - 25.0 Final     Calcium   Date Value Ref Range Status   09/29/2024 8.7 8.6 - 10.5 mg/dL Final     Albumin   Date Value Ref Range Status   09/27/2024 4.5 3.5 - 5.2 g/dL Final     Magnesium   Date Value Ref Range Status   09/27/2024 1.8 1.6 - 2.6 mg/dL Final     Phosphorus   Date Value Ref  Range Status   09/27/2024 4.1 2.5 - 4.5 mg/dL Final     Lab Results   Component Value Date    HGBA1C 8.70 (H) 03/04/2024    HGBA1C 8.9 (H) 02/10/2023    HGBA1C 9.6 (H) 06/17/2022     Lab Results   Component Value Date    MICROALBUR 2.1 03/04/2024     Results from last 7 days   Lab Units 09/29/24  1137 09/29/24  0749 09/29/24  0547 09/29/24  0438 09/29/24  0250 09/29/24  0144   GLUCOSE mg/dL 241* 147* 129* 127* 128* 115*             Medication Review: Reviewed.     aspirin, 81 mg, Oral, Daily  chlorhexidine, 15 mL, Mouth/Throat, Q12H  [START ON 9/30/2024] enoxaparin, 40 mg, Subcutaneous, Daily  fenofibrate, 145 mg, Oral, Daily  furosemide, 40 mg, Oral, Daily  insulin lispro, 4-24 Units, Subcutaneous, TID With Meals  Insulin Regular Human (Conc), 60 Units, Subcutaneous, TID AC  metoprolol tartrate, 75 mg, Oral, Q12H  mupirocin, , Each Nare, BID  pantoprazole, 40 mg, Oral, Q AM  polyethylene glycol, 17 g, Oral, BID  rosuvastatin, 40 mg, Oral, Nightly  senna-docusate sodium, 2 tablet, Oral, BID              Assessment and plan:  Diabetes mellitus type 2 with hyperglycemia: Uncontrolled, will increase Humulin R U5 insulin to 70 units subcu 3 times a day before meals along with Humalog sliding scale and follow blood sugars.    CAD: Status post CABG    Hypertriglyceridemia:  on rosuvastatin and fenofibrate    Rosario Mendenhall MD. FACE

## 2024-09-30 ENCOUNTER — APPOINTMENT (OUTPATIENT)
Dept: GENERAL RADIOLOGY | Facility: HOSPITAL | Age: 59
End: 2024-09-30
Payer: COMMERCIAL

## 2024-09-30 ENCOUNTER — TELEPHONE (OUTPATIENT)
Dept: CARDIAC SURGERY | Facility: CLINIC | Age: 59
End: 2024-09-30
Payer: COMMERCIAL

## 2024-09-30 PROBLEM — Z95.1 S/P CABG X 3: Status: ACTIVE | Noted: 2024-09-30

## 2024-09-30 LAB
ANION GAP SERPL CALCULATED.3IONS-SCNC: 7.7 MMOL/L (ref 5–15)
BUN SERPL-MCNC: 22 MG/DL (ref 6–20)
BUN/CREAT SERPL: 29.3 (ref 7–25)
CALCIUM SPEC-SCNC: 9 MG/DL (ref 8.6–10.5)
CHLORIDE SERPL-SCNC: 98 MMOL/L (ref 98–107)
CO2 SERPL-SCNC: 31.3 MMOL/L (ref 22–29)
CREAT SERPL-MCNC: 0.75 MG/DL (ref 0.76–1.27)
DEPRECATED RDW RBC AUTO: 49.2 FL (ref 37–54)
EGFRCR SERPLBLD CKD-EPI 2021: 104 ML/MIN/1.73
ERYTHROCYTE [DISTWIDTH] IN BLOOD BY AUTOMATED COUNT: 15.2 % (ref 12.3–15.4)
GLUCOSE BLDC GLUCOMTR-MCNC: 232 MG/DL (ref 70–105)
GLUCOSE BLDC GLUCOMTR-MCNC: 247 MG/DL (ref 70–105)
GLUCOSE BLDC GLUCOMTR-MCNC: 256 MG/DL (ref 70–105)
GLUCOSE BLDC GLUCOMTR-MCNC: 273 MG/DL (ref 70–105)
GLUCOSE BLDC GLUCOMTR-MCNC: 288 MG/DL (ref 70–105)
GLUCOSE BLDC GLUCOMTR-MCNC: 324 MG/DL (ref 70–105)
GLUCOSE SERPL-MCNC: 236 MG/DL (ref 65–99)
HCT VFR BLD AUTO: 30.2 % (ref 37.5–51)
HGB BLD-MCNC: 9.4 G/DL (ref 13–17.7)
MCH RBC QN AUTO: 27.5 PG (ref 26.6–33)
MCHC RBC AUTO-ENTMCNC: 31.1 G/DL (ref 31.5–35.7)
MCV RBC AUTO: 88.3 FL (ref 79–97)
PLATELET # BLD AUTO: 82 10*3/MM3 (ref 140–450)
PMV BLD AUTO: 11.1 FL (ref 6–12)
POTASSIUM SERPL-SCNC: 4.2 MMOL/L (ref 3.5–5.2)
RBC # BLD AUTO: 3.42 10*6/MM3 (ref 4.14–5.8)
SODIUM SERPL-SCNC: 137 MMOL/L (ref 136–145)
WBC NRBC COR # BLD AUTO: 4.58 10*3/MM3 (ref 3.4–10.8)

## 2024-09-30 PROCEDURE — 99232 SBSQ HOSP IP/OBS MODERATE 35: CPT | Performed by: INTERNAL MEDICINE

## 2024-09-30 PROCEDURE — 71045 X-RAY EXAM CHEST 1 VIEW: CPT

## 2024-09-30 PROCEDURE — 97110 THERAPEUTIC EXERCISES: CPT

## 2024-09-30 PROCEDURE — 85027 COMPLETE CBC AUTOMATED: CPT | Performed by: NURSE PRACTITIONER

## 2024-09-30 PROCEDURE — 63710000001 INSULIN LISPRO (HUMAN) PER 5 UNITS: Performed by: INTERNAL MEDICINE

## 2024-09-30 PROCEDURE — 99024 POSTOP FOLLOW-UP VISIT: CPT | Performed by: NURSE PRACTITIONER

## 2024-09-30 PROCEDURE — 82948 REAGENT STRIP/BLOOD GLUCOSE: CPT

## 2024-09-30 PROCEDURE — 80048 BASIC METABOLIC PNL TOTAL CA: CPT | Performed by: NURSE PRACTITIONER

## 2024-09-30 PROCEDURE — 82948 REAGENT STRIP/BLOOD GLUCOSE: CPT | Performed by: INTERNAL MEDICINE

## 2024-09-30 PROCEDURE — 97116 GAIT TRAINING THERAPY: CPT

## 2024-09-30 PROCEDURE — 97112 NEUROMUSCULAR REEDUCATION: CPT

## 2024-09-30 RX ORDER — LACTULOSE 10 G/15ML
20 SOLUTION ORAL ONCE AS NEEDED
Status: DISCONTINUED | OUTPATIENT
Start: 2024-09-30 | End: 2024-10-02 | Stop reason: HOSPADM

## 2024-09-30 RX ORDER — ENOXAPARIN SODIUM 100 MG/ML
40 INJECTION SUBCUTANEOUS DAILY
Status: DISCONTINUED | OUTPATIENT
Start: 2024-10-01 | End: 2024-10-02 | Stop reason: HOSPADM

## 2024-09-30 RX ADMIN — METOPROLOL TARTRATE 75 MG: 50 TABLET, FILM COATED ORAL at 09:17

## 2024-09-30 RX ADMIN — INSULIN LISPRO 8 UNITS: 100 INJECTION, SOLUTION INTRAVENOUS; SUBCUTANEOUS at 12:50

## 2024-09-30 RX ADMIN — ASPIRIN 81 MG: 81 TABLET, COATED ORAL at 09:17

## 2024-09-30 RX ADMIN — CHLORHEXIDINE GLUCONATE, 0.12% ORAL RINSE 15 ML: 1.2 SOLUTION DENTAL at 09:17

## 2024-09-30 RX ADMIN — ROSUVASTATIN 40 MG: 10 TABLET, FILM COATED ORAL at 20:00

## 2024-09-30 RX ADMIN — HYDROCODONE BITARTRATE AND ACETAMINOPHEN 1 TABLET: 5; 325 TABLET ORAL at 17:48

## 2024-09-30 RX ADMIN — METOPROLOL TARTRATE 75 MG: 50 TABLET, FILM COATED ORAL at 20:00

## 2024-09-30 RX ADMIN — FENOFIBRATE 145 MG: 145 TABLET ORAL at 09:17

## 2024-09-30 RX ADMIN — MUPIROCIN 1 APPLICATION: 20 OINTMENT TOPICAL at 20:00

## 2024-09-30 RX ADMIN — CYCLOBENZAPRINE 5 MG: 10 TABLET, FILM COATED ORAL at 22:15

## 2024-09-30 RX ADMIN — MUPIROCIN 1 APPLICATION: 20 OINTMENT TOPICAL at 09:17

## 2024-09-30 RX ADMIN — HYDROCODONE BITARTRATE AND ACETAMINOPHEN 1 TABLET: 5; 325 TABLET ORAL at 05:54

## 2024-09-30 RX ADMIN — INSULIN LISPRO 8 UNITS: 100 INJECTION, SOLUTION INTRAVENOUS; SUBCUTANEOUS at 09:16

## 2024-09-30 RX ADMIN — POLYETHYLENE GLYCOL 3350 17 G: 17 POWDER, FOR SOLUTION ORAL at 09:18

## 2024-09-30 RX ADMIN — POLYETHYLENE GLYCOL 3350 17 G: 17 POWDER, FOR SOLUTION ORAL at 20:06

## 2024-09-30 RX ADMIN — SENNOSIDES AND DOCUSATE SODIUM 2 TABLET: 50; 8.6 TABLET ORAL at 20:00

## 2024-09-30 RX ADMIN — CHLORHEXIDINE GLUCONATE, 0.12% ORAL RINSE 15 ML: 1.2 SOLUTION DENTAL at 20:00

## 2024-09-30 RX ADMIN — INSULIN LISPRO 12 UNITS: 100 INJECTION, SOLUTION INTRAVENOUS; SUBCUTANEOUS at 17:48

## 2024-09-30 RX ADMIN — PANTOPRAZOLE SODIUM 40 MG: 40 TABLET, DELAYED RELEASE ORAL at 05:35

## 2024-09-30 RX ADMIN — SENNOSIDES AND DOCUSATE SODIUM 2 TABLET: 50; 8.6 TABLET ORAL at 09:17

## 2024-09-30 RX ADMIN — FUROSEMIDE 40 MG: 40 TABLET ORAL at 09:17

## 2024-09-30 NOTE — CONSULTS
Spoke with pt re phase 2 cardiac rehab,diet and exercise,restrictions, states did not attend rehab after first surgery

## 2024-09-30 NOTE — THERAPY TREATMENT NOTE
"Subjective: Pt agreeable to therapeutic plan of care.    Objective:   Phase 1 Cardiac Rehab Initiated - Acute Care    Cardiac Level III Activities  Sitting tolerance: >10min and supported  Standing tolerance: 1-5min and supported    Precautions:  Mid-sternal incision; avoid scapular retraction and depression.  Cardiovascular impairment post-sx; encourage energy conservation strategies.  Pt recalled 0/3 sternal precautions.   Therapeutic Exercise: 10 reps UE and LE AROM in supine    MET level equivalent: 2.0-3.0 (Unlimited sitting, ambulation on level ground <2mph, light housework)   Precautions - desaturates with activity and sternal precautions, CT    Bed mobility - CGA with verbal cues  Transfers - SBA and CGA  Ambulation - 225 feet CGA and with rolling walker    Vitals: Desaturates with O2 titrated to 3 L for gait ( pleth was faulty)    Pain:  \"a little\" Location: generalized  Intervention for pain: Repositioned and Increased Activity    Education: Provided education on the importance of mobility in the acute care setting, Verbal/Tactile Cues, Transfer Training, Energy conservation strategies, and Post-Op Precautions    Assessment: Rodney Chaney presents with functional mobility impairments which indicate the need for skilled intervention. Pt is POD #4 for CABG. Pt recalled 0/3 sternal precautions. PT reviewed sternal precautions with pt; pt 100% consistent with use of heart hugger though.  Pt has a CT due to pneumothorax but CT may be pulled today. Tolerating session today without incident. Will continue to follow and progress as tolerated.     Plan/Recommendations:   If medically appropriate, No ongoing therapy recommended post-acute care. No therapy needs. Pt requires no DME at discharge.     Pt desires Home with family assist at discharge. Pt cooperative; agreeable to therapeutic recommendations and plan of care.         Basic Mobility 6-click:  Rollin = Total, A lot = 2, A little = 3; 4 = " None  Supine>Sit:   1 = Total, A lot = 2, A little = 3; 4 = None   Sit>Stand with arms:  1 = Total, A lot = 2, A little = 3; 4 = None  Bed>Chair:   1 = Total, A lot = 2, A little = 3; 4 = None  Ambulate in room:  1 = Total, A lot = 2, A little = 3; 4 = None  3-5 Steps with railin = Total, A lot = 2, A little = 3; 4 = None  Score: 17    Modified [place]: N/A = No pre-op stroke/TIA    Post-Tx Position: Supine with HOB Elevated, Alarms activated, and Call light and personal items within reach  PPE: gloves

## 2024-09-30 NOTE — CONSULTS
"Diabetes Education  Assessment/Teaching    Patient Name:  Rodney Chaney  YOB: 1965  MRN: [id number]  Admit Date:  9/26/2024      Assessment Date:  9/30/2024  Flowsheet Row Most Recent Value   General Information     Referral From: Blood glucose, A1c, MD order  [MD consult for insulin teaching, admission blood sugar 282, and on 3/24/2024 A1c was 8.7%.]   Height 182.9 cm (72.01\")   Weight 87.6 kg (193 lb 3.2 oz)   Weight Method Bed scale   Pregnancy Assessment    Diabetes History    What type of diabetes do you have? Type 2   Length of Diabetes Diagnosis 10 + years  [Patient stated he was diagnosed about 30 years ago.]   Current DM knowledge fair   Do you test your blood sugar at home? yes   Frequency of checks as often as needs   Meter type Dexcom G7   Who performs the test? patient   Typical readings 200 in the morning and 100 around supper.   Have you had high blood sugar? (>140mg/dl) yes   How often do you have high blood sugar? frequently   When was your last high blood sugar? Admission blood sugar 282   Education Preferences    What areas of diabetes would you like to learn about? avoiding high blood sugar, diabetes complications, medications for diabetes   Nutrition Information    Assessment Topics    Taking Medication - Assessment Needs education   Problem Solving - Assessment Needs education   Reducing Risk - Assessment Needs education   DM Goals    Taking Medication - Goal Today   Problem Solving - Goal Today   Reducing Risk - Goal Today            Flowsheet Row Most Recent Value   DM Education Needs    Meter Has own   Meter Type Other (comment)  [Dexcom G7]   Medication Insulin, Oral, Pen  [At home patient stated that he takes Jardiance 25 mg daily, Metformin 1000 mg BID, and Humulin R U-500 130 units before breakfast and lunch and 120 units before supper.]   Problem Solving Hyperglycemia, Signs, Symptoms, Treatment   Reducing Risks A1C testing  [On 3/24/2024 A1c was 8.7%.]   Discharge Plan " Home   Motivation Moderate   Teaching Method Discussion, Handouts, Demonstration, Teach back, Explanation   Patient Response Demonstrates adequately, Verbalized understanding              Other Comments:  A1c info sheet given with discussion on A1c target and healthy blood sugar range. Asked patient if he knew to prime insulin pen prior to administration and he said no. Patient also did not know about different injection sites. Handouts given with discussion on types of insulin, storage of insulin, injection sites, disposal of needles, rotation of sites, and how to use an insulin pen. Patient did return demonstration of applying the pen needle to the insulin pen, priming the pen, administering the shot into the foam pad,and holding the pen for 10 seconds after administration. Patient stated he uses the same pen needle for the whole day. Explained to patient the importance of using a new pen needle for each injection and that with each injection the pen needle would become dull. Explained to patient that U-500 insulin is 5X the strength of regular insulin. Patient stated he has enough insulin and supplies at home. Discussed with patient the importance of good blood sugar control post surgery. Patient has no further questions or concerns related to diabetes at this time.      Electronically signed by:  Johanna Mackey RN  09/30/24 16:29 EDT

## 2024-09-30 NOTE — PROGRESS NOTES
CARDIOLOGY PROGRESS NOTE:    Rodney Chaney  59 y.o.  male  1965  [id number]      Referring Provider: Cardiac surgeon    Reason for follow-up: Status post coronary artery bypass surgery     Patient Care Team:  Prabhu Downing MD as PCP - General  Prabhu Downing MD as PCP - Family Medicine  Ham Jacinto MD as Consulting Physician (Cardiology)    Subjective .  No chest pain or shortness of breath    Objective sitting in chair comfortably     Review of Systems   Constitutional: Negative for fever and malaise/fatigue.   HENT:  Negative for ear pain and nosebleeds.    Eyes:  Negative for blurred vision and double vision.   Cardiovascular:  Negative for chest pain, dyspnea on exertion and palpitations.   Respiratory:  Negative for cough and shortness of breath.    Skin:  Negative for rash.   Musculoskeletal:  Negative for joint pain.   Gastrointestinal:  Negative for abdominal pain, nausea and vomiting.   Neurological:  Negative for focal weakness and headaches.   Psychiatric/Behavioral:  Negative for depression. The patient is not nervous/anxious.    All other systems reviewed and are negative.      Allergies: Levofloxacin    Scheduled Meds:aspirin, 81 mg, Oral, Daily  chlorhexidine, 15 mL, Mouth/Throat, Q12H  [START ON 10/1/2024] enoxaparin, 40 mg, Subcutaneous, Daily  fenofibrate, 145 mg, Oral, Daily  furosemide, 40 mg, Oral, Daily  insulin lispro, 4-24 Units, Subcutaneous, TID With Meals  Insulin Regular Human (Conc), 80 Units, Subcutaneous, TID AC  metoprolol tartrate, 75 mg, Oral, Q12H  mupirocin, , Each Nare, BID  pantoprazole, 40 mg, Oral, Q AM  polyethylene glycol, 17 g, Oral, BID  rosuvastatin, 40 mg, Oral, Nightly  senna-docusate sodium, 2 tablet, Oral, BID      Continuous Infusions:     PRN Meds:.  acetaminophen **OR** acetaminophen **OR** acetaminophen    Calcium Replacement - Follow Nurse / BPA Driven Protocol    cyclobenzaprine    dextrose    dextrose    glucagon (human recombinant)     "HYDROcodone-acetaminophen    lactulose    Magnesium Cardiology Dose Replacement - Follow Nurse / BPA Driven Protocol    [] Morphine **AND** naloxone    nitroglycerin    ondansetron    Phosphorus Replacement - Follow Nurse / BPA Driven Protocol    Potassium Replacement - Follow Nurse / BPA Driven Protocol        VITAL SIGNS  Vitals:    24 1000 24 1100 24 1139 24 1200   BP: 116/70 124/67 124/67 121/67   BP Location:   Right arm    Patient Position:   Lying    Pulse: 76 82 78 77   Resp:   24    Temp:   97.7 °F (36.5 °C)    TempSrc:   Oral    SpO2: 92% (!) 0% 96% 97%   Weight:       Height:           Flowsheet Rows      Flowsheet Row First Filed Value   Admission Height 182.9 cm (72.01\") Documented at 2024 1250   Admission Weight 85.3 kg (188 lb 0.8 oz) Documented at 2024 1250             TELEMETRY: Sinus rhythm    Physical Exam:  Constitutional:       Appearance: Well-developed.   Eyes:      General: No scleral icterus.     Conjunctiva/sclera: Conjunctivae normal.      Pupils: Pupils are equal, round, and reactive to light.   HENT:      Head: Normocephalic and atraumatic.   Neck:      Vascular: No carotid bruit or JVD.   Pulmonary:      Effort: Pulmonary effort is normal.      Breath sounds: Normal breath sounds. No wheezing. No rales.   Cardiovascular:      Normal rate. Regular rhythm.   Pulses:     Intact distal pulses.   Abdominal:      General: Bowel sounds are normal.      Palpations: Abdomen is soft.   Musculoskeletal: Normal range of motion.      Cervical back: Normal range of motion and neck supple. Skin:     General: Skin is warm and dry.      Findings: No rash.   Neurological:      Mental Status: Alert.      Comments: No focal deficits          Results Review:   I reviewed the patient's new clinical results.  Lab Results (last 24 hours)       Procedure Component Value Units Date/Time    POC Glucose Once [[id number]]  (Abnormal) Collected: 24 1142    Specimen: " Blood Updated: 09/30/24 1153     Glucose 247 mg/dL      Comment: Serial Number: [id number]:  [id number]       POC Glucose 4x Daily Before Meals & at Bedtime [[id number]]  (Abnormal) Collected: 09/30/24 0738    Specimen: Blood Updated: 09/30/24 0750     Glucose 232 mg/dL      Comment: Serial Number: [id number]:  [id number]       Basic Metabolic Panel [[id number]]  (Abnormal) Collected: 09/30/24 0315    Specimen: Blood Updated: 09/30/24 0417     Glucose 236 mg/dL      BUN 22 mg/dL      Creatinine 0.75 mg/dL      Sodium 137 mmol/L      Potassium 4.2 mmol/L      Chloride 98 mmol/L      CO2 31.3 mmol/L      Calcium 9.0 mg/dL      BUN/Creatinine Ratio 29.3     Anion Gap 7.7 mmol/L      eGFR 104.0 mL/min/1.73     Narrative:      GFR Normal >60  Chronic Kidney Disease <60  Kidney Failure <15      CBC (No Diff) [[id number]]  (Abnormal) Collected: 09/30/24 0315    Specimen: Blood Updated: 09/30/24 0328     WBC 4.58 10*3/mm3      RBC 3.42 10*6/mm3      Hemoglobin 9.4 g/dL      Hematocrit 30.2 %      MCV 88.3 fL      MCH 27.5 pg      MCHC 31.1 g/dL      RDW 15.2 %      RDW-SD 49.2 fl      MPV 11.1 fL      Platelets 82 10*3/mm3     POC Glucose Once [[id number]]  (Abnormal) Collected: 09/30/24 0010    Specimen: Blood Updated: 09/30/24 0011     Glucose 256 mg/dL      Comment: Serial Number: [id number]:  [id number]       POC Glucose Once [[id number]]  (Abnormal) Collected: 09/29/24 2210    Specimen: Blood Updated: 09/29/24 2211     Glucose 298 mg/dL      Comment: Serial Number: [id number]:  [id number]       POC Glucose Once [[id number]]  (Abnormal) Collected: 09/29/24 2041    Specimen: Blood Updated: 09/29/24 2043     Glucose 300 mg/dL      Comment: Serial Number: [id number]:  [id number]       POC Glucose Once [[id number]]  (Abnormal) Collected: 09/29/24 1626    Specimen: Blood Updated: 09/29/24 1628     Glucose 256 mg/dL      Comment: Serial Number: [id number]:  [id number]               Imaging Results  (Last 24 Hours)       Procedure Component Value Units Date/Time    XR Chest 1 View [[id number]] Collected: 09/30/24 0725     Updated: 09/30/24 0729    Narrative:      XR CHEST 1 VW    Date of Exam: 9/30/2024 2:01 AM EDT    Indication: FU pneumothorax    Comparison: 9/29/2024    Findings:  Stable position of chest tube overlying the left midlung. Postsurgical changes of sternotomy and CABG. Lungs hypoinflated with mild bibasilar atelectasis similar to the prior study. Trace residual right apical pneumothorax measuring 6 mm. No discrete   pneumothorax on the left. No significant effusion.      Impression:      Impression:  1. Trace residual right apical pneumothorax measuring 6 mm.  2. Stable position of left-sided chest tube. No pneumothorax on the left.  3. Hypoinflated lungs with mild bibasilar atelectasis.      Electronically Signed: Baljinder Chacon MD    9/30/2024 7:27 AM EDT    Workstation ID: [id number]    XR Chest 1 View [[id number]] Collected: 09/29/24 1426     Updated: 09/29/24 1430    Narrative:      DATE OF EXAM:  9/29/2024 9:24 AM     PROCEDURE:  XR CHEST 1 VW-     INDICATIONS:  FU pneumothorax; I25.708-Atherosclerosis of coronary artery bypass  graft(s), unspecified, with other forms of angina pectoris     COMPARISON:  September 28, 2024.     TECHNIQUE:   Single radiographic view of the chest was obtained.     FINDINGS:  Previously seen right apical pneumothorax is not clearly visualized.  Left chest tube appears in stable position. No left pneumothorax is  identified. Low lung volumes with perihilar and bibasilar opacities, as  before. Heart size and mediastinal contour appear grossly unchanged.  Status post median sternotomy and CABG. No significant new pleural  effusion. No definite new infiltrate.       Impression:      1.     Trace right apical pneumothorax on the prior chest radiograph is  not clearly visible on this exam.  2.     Low lung volumes with persistent perihilar and bibasilar  opacities.  3.      Left chest tube in similar position. No definite left  pneumothorax.        Electronically Signed By-Tanner Benavidez MD On:9/29/2024 2:28 PM               EKG      I personally viewed and interpreted the patient's EKG/Telemetry data:    ECHOCARDIOGRAM:  Results for orders placed in visit on 09/26/24    Intra-Op Anesthesia EDER    Narrative  Intra-Op Anesthesia EDER    Procedure Performed: Intra-Op Anesthesia EDER  Start Time:  End Time:    Preanesthesia Checklist:  Patient identified, IV assessed, risks and benefits discussed, monitors and equipment assessed, procedure being performed at surgeon's request and anesthesia consent obtained.    General Procedure Information  EDER Placed for monitoring purposes only -- This is not a diagnostic EDER  Diagnostic Indications for Echo:  hemodynamic monitoring  Location performed:  OR  Intubated  Bite block placed  Heart visualized  Probe Insertion:  Easy  Probe Type:  Biplane  Modalities:  Color flow mapping, continuous wave Doppler and 2D only    Echocardiographic and Doppler Measurements    Ventricles    Right Ventricle:  Cavity size normal.  Hypertrophy not present.  Global function normal.  Left Ventricle:  Cavity size normal.  Global Function normal.  Ejection Fraction 55%.        Valves    Aortic Valve:  Regurgitation absent.  Leaflet motions normal.    Mitral Valve:  Annulus normal.  Regurgitation trace.  Leaflets normal.  Leaflet motions normal.    Tricuspid Valve:  Regurgitation absent.  Leaflet motions normal.      Aorta    Ascending Aorta:  Size normal.  Mobile plaque not present.  Aortic Arch:  Size normal.  Mobile plaque not present.  Descending Aorta:  Size normal.  Mobile plaque not present.      Atria    Right Atrium:  Size normal.  Left Atrium:  Size normal.                Anesthesia Information  Performed Personally  Anesthesiologist:  Benson Fisher MD      Echocardiogram Comments:  Pre CPB:         Trace MR, EF 55%  Post CPB:    no change       STRESS  MYOVIEW:  Results for orders placed during the hospital encounter of 08/20/24    Stress Test With Myocardial Perfusion - One Day    Interpretation Summary    Impressions are consistent with a high risk study.    Left ventricular ejection fraction is mildly reduced (Calculated EF = 43%).    Myocardial perfusion imaging indicates a large-sized, severe area of ischemia located in the inferior wall and lateral wall.       CARDIAC CATHETERIZATION:  Results for orders placed during the hospital encounter of 08/30/24    Cardiac Catheterization/Vascular Study       OTHER:         Assessment & Plan     Coronary artery disease  Patient underwent redo coronary bypass surgery with SVG to the PDA OM and diagonal branches but have also has a LIMA from the previous surgery which is patent to the LAD  Patient is currently intubated and is off sedation  He will be extubated soon  Patient's chest tubes and Dorsey catheter draining well.  Blood pressure is currently slightly high and hence is on Cardene  Patient is extubated and started on oral medicines  Patient is also doing well ambulating.  He still has 1 chest tube which will be removed today.  Blood pressure and heart rate stable.      Hypertension  Patient blood pressure currently stable and is on oral medicines    Hyperlipidemia  Patient patient is started on statins    Diabetes  Patient is on insulin and will be managed by the primary care doctor.    -    I discussed the patients findings and my recommendations with patient's nurse  Ham Jacinto MD  09/30/24  13:15 EDT

## 2024-09-30 NOTE — DISCHARGE INSTRUCTIONS
Post Coronary Artery Bypass Graft (CABG) Surgery Discharge Instructions    To Accompany Standard Discharge Form      Call these physicians to schedule a follow up visit:    Cardiothoracic Surgery Phone:  [phone] Fax:  [phone] Address:  [address] [place] [address] [place] [address]       Wear your Heart Hugger continuously until your follow-up appointment with Cardiothoracic Surgery.  No lifting over 10 pounds (gallon of milk) for six (6) weeks after surgery.  No driving for four (4) weeks.    Showers are to be taken every day.    Clean all incisions with fragrance free liquid anti-bacterial soap.  No bar soap because bacteria grows on bar soap.  Please do not use any lotions or powders on incisions.  No tub baths until incisions are completely healed.   Avoid sitting for long periods of time.  Try to stand and walk every 1-2 hours when you travel or are sitting for a long time.   If your legs are swollen, elevate your legs on pillows above the level of your heart.    Rest as needed during the day, but do not take more than 2 naps during the day.   Extra naps may interfere with your ability to sleep at night.  Take your pain medications as ordered, particularly at night.    Avoid straining or bending over with your head down.    You may participate in sex when you can walk up 2 flights of stairs without shortness of breath.   Avoid positions that put pressure on your upper arms.  Do not have sex after a heavy meal or if you are tired.  Weigh yourself daily.   Weight gain can be a sign of extra fluid in your lungs and body.   Call your care giver if you have gained 2-3 pounds in one day.  Stay away from large crowds and people with colds, influenzas, or COVID.  Keep your incision clean.  Do not hold animals or small children or allow them near your incision sites to prevent infection.    Your physician will release you to return to work after your surgery.   Usually patients may return to  work six (6) weeks after surgery.    If you smoke, now is a good time to quit.  Nicotine can cause increased narrowing of the new grafts.  Call the doctor if you have any of the following:    Incisions that are red, swollen, or increasingly tender or have pus coming from them  A temperature over 101.0 degrees  Sudden trouble breathing. This could be a sign of a clot in your lung  A “pop” in your chest bone or the incision is   Increasing chest pain or pressure radiating to your neck, jaw, or arm. These are signs of a heart attack.  Weakness on one side of your body or difficulty speaking. These are signs of a stroke.    In case of an emergency, contact your physician or nurse practitioner. They will direct you regarding whether to go to the emergency room or call 911.Post Coronary Artery Bypass Graft (CABG) Surgery Discharge Instructions    To Accompany Standard Discharge Form      Call these physicians to schedule a follow up visit:    Cardiothoracic Surgery Phone:  [phone] Fax:  [phone] Address:  [address] [place] [address] [place] [facility]       Wear your Heart Hugger continuously until your follow-up appointment with Cardiothoracic Surgery.  No lifting over 10 pounds (gallon of milk) for six (6) weeks after surgery.  No driving for four (4) weeks.    Showers are to be taken every day.    Clean all incisions with fragrance free liquid anti-bacterial soap.  No bar soap because bacteria grows on bar soap.  Please do not use any lotions or powders on incisions.  No tub baths until incisions are completely healed.   Avoid sitting for long periods of time.  Try to stand and walk every 1-2 hours when you travel or are sitting for a long time.   If your legs are swollen, elevate your legs on pillows above the level of your heart.    Rest as needed during the day, but do not take more than 2 naps during the day.   Extra naps may interfere with your ability to sleep at night.  Take  your pain medications as ordered, particularly at night.    Avoid straining or bending over with your head down.    You may participate in sex when you can walk up 2 flights of stairs without shortness of breath.   Avoid positions that put pressure on your upper arms.  Do not have sex after a heavy meal or if you are tired.  Weigh yourself daily.   Weight gain can be a sign of extra fluid in your lungs and body.   Call your care giver if you have gained 2-3 pounds in one day.  Stay away from large crowds and people with colds, influenzas, or COVID.  Keep your incision clean.  Do not hold animals or small children or allow them near your incision sites to prevent infection.    Your physician will release you to return to work after your surgery.   Usually patients may return to work six (6) weeks after surgery.    If you smoke, now is a good time to quit.  Nicotine can cause increased narrowing of the new grafts.  Call the doctor if you have any of the following:    Incisions that are red, swollen, or increasingly tender or have pus coming from them  A temperature over 101.0 degrees  Sudden trouble breathing. This could be a sign of a clot in your lung  A “pop” in your chest bone or the incision is   Increasing chest pain or pressure radiating to your neck, jaw, or arm. These are signs of a heart attack.  Weakness on one side of your body or difficulty speaking. These are signs of a stroke.    In case of an emergency, contact your physician or nurse practitioner. They will direct you regarding whether to go to the emergency room or call 911.

## 2024-09-30 NOTE — TELEPHONE ENCOUNTER
Caller: LEOLA MUHAMMAD    Relationship: Emergency Contact    Best call back number:     [phone]       What form or medical record are you requesting: [facility]     Who is requesting this form or medical record from you: [employer]    [facility].     How would you like to receive the form or medical records (pick-up, mail, fax): FAX: [phone]    Timeframe paperwork needed: PATIENT STATES THAT [facility] PAPERWORK WAS DROPPED OFF A COUPLE WEEKS AGO.     Additional notes: PATIENT WOULD LIKE AN UPDATE ON [facility] PAPERWORK.

## 2024-09-30 NOTE — PAYOR COMM NOTE
"This is clinical for oRdney Chaney   Reference/Auth#: [id number]     EXTENDED AUTHORIZATION PENDING:     Please call or fax determination to contact below.   Thank you.    ABDI Bonilla, RN, CCM  Utilization Review Nurse  [facility]  Direct & confidential phone # [phone]  Fax # [phone]      Rodney Chaney (59 y.o. Male)       Date of Birth   1965    Social Security Number       Address   [address] [place] [address] [place] [facility]    Home Phone   [phone]    MRN   [id number]       [employer]    Marital Status                               Admission Date   9/26/24    Admission Type   Elective    Admitting Provider   Osmany Mares MD    Attending Provider   Osmany Mares MD    Department, Room/Bed   [facility] CARDIOVASCULAR CARE UNIT, 3115/1       Discharge Date       Discharge Disposition       Discharge Destination                                 Attending Provider: Osmany Mares MD    Allergies: Levofloxacin    Isolation: None   Infection: None   Code Status: CPR    Ht: 182.9 cm (72.01\")   Wt: 87.6 kg (193 lb 3.2 oz)    Admission Cmt: None   Principal Problem: Coronary artery disease [I25.10]                   Active Insurance as of 9/26/2024       Primary Coverage       Payor Plan Insurance Group Employer/Plan Group    [facility] [id number]       Payor Plan Address Payor Plan Phone Number Payor Plan Fax Number Effective Dates    [address] [phone]  1/1/2024 - None Entered    [place] [id number]         Subscriber Name Subscriber Birth Date Member ID       RDONEY CHANEY 1965 [id number]                     Emergency Contacts        (Rel.) Home Phone Work Phone Mobile Phone    LEOLA CHANEY (Spouse) -- -- [phone]                 Operative/Procedure Notes (last 7 days)        Osmany Mares MD at 09/26/24 0743          Operative Note    Date of Dictation: " 09/27/24    Date of Procedure: 9/26/2024    Referring Physician: Ham Jacinto MD    Preoperative diagnosis:   1.  Multivessel coronary artery disease  2.  Status post CABG with recurrent coronary disease  3.  Progressive angina  4.  Abnormal nuclear stress test and LV dysfunction    Postoperative diagnosis:   Same    Procedure:   1.  Redo sternotomy with extensive lysis of adhesions   2.  CABG x 3 with reverse Individual saphenous vein graft to the obtuse marginal 1 and 2 and to the PDA  3.  EVH of the left leg    Surgeon: Osmany Mares MD     Assistants: Assistant: Gabrielle Adams PA-C was responsible for performing the following activities: Retraction, Suction, Irrigation, Suturing, Closing, Placing Dressing, Harvesting of Vessels, Bypass Grafting, and all aspects of the complex cardiac case  and their skilled assistance was necessary for the success of this case.    Anesthesia: General endotracheal anesthesia and EDER    Findings:  The saphenous vein was harvested endoscopically form the left  leg. The vein had a diameter of 3.5 mm and was of good quality. The coronaries diameters of 1.75 and 2 mm.    Estimated Blood Loss: Approximately 400 cc, most of it recovered with a Cell Saver device and cardiotomy suckers and was retransfuse to the patient    STS Data:    The patient was explained the risks (STS risk score calculated), benefits and alternatives of surgery and agreed to proceed. The antibiotics and b blockers were given in the STS required window.  Counseling was given about diet, alcohol and tobacco use as needed        Description of the procedure:     The patient was placed supine on the operative table. General anesthesia was given and lines placed. The patient was prepped and draped using the usual sterile technique. A median sternotomy incision was performed with a scalpel in line of the prior incision and the layers carried down to the sternum using the electrocautery.  Wires were cut and removed.   The sternum was split in the midline using a transverse oscillating saw. Hemostasis was achieved.  The vein was harvested endoscopically had good quality.. It was of good quality. 300 units/kg of IV heparin was given to an ACT over 400.  Dissection was performed under each sternal table and the retractor was placed and the mediastinum was exposed.  Took approximately 1 hour of dissection to obtain cannulation sites in the distal ascending aorta and right atrium.  A Favaloro retractor was placed again and the mediastinum exposed, the pericardium was opened and the edges tacked to the wound. Cannulation sutures were placed in the ascending aorta and right atrium. Small cannulas were placed and aorto right atrial cardiopulmonary bypass was started. Cardioplegia cannulas were placed and then the ascending aorta was clamped. One liter of cold blood cardioplegia was given in an antegrade fashion to achieved diastolic arrest and further doses every 15 minutes thereafter.  Dissection was performed and a bulldog was placed in the JESU pedicle.  Constructions of grafts were done in the sequence distal - proximal between the reversed saphenous vein graft and each coronary targets. Grafts were constructed to the PDA, OM1, and OM2 coronary arteries and plegia was given between graft sequences after de-airing the aortic root and tying the proximal anastomosis.  All anastomoses were checked and had good flow and morphology.  Patient was systemically rewarmed, the warm dose of cardioplegia was given and then the aortic was clamped and bulldog clamp were removed with tip suction on the aortic vent.  The left pleural space was suctioned and the lungs ventilated. The heart was paced till regular atrial rhythm resumed. I allowed the heart to eject and once hemodynamics were acceptable, then the CPB was discontinued and the venous and cardioplegia cannulas removed. The matching dose of protamine was given and the aortic cannula removed  as well. AV temporary wires and pleural and mediastinal chest tubes were placed and the wound sprayed with platelet rich plasma.  The sternum was closed with single and double wires and soft tissue in layers of reabsorbable material. The wounds were covered with sterile dressings.       Specimen removed:  none    CPB time: 79 minutes    Aortic clamp time: 62 minutes    Complications:  none           Disposition: Cardiovascular recovery room           Condition: Critical but stable.      Electronically signed by Osmany Mares MD at 09/27/24 1629          Physician Progress Notes (last 72 hours)        Satterly-Anamika Rayo APRN at 09/30/24 1006          S/P POD# 4 reop CABG--Vishnu  EF 50-55% (echo)    Subjective:  no c/o's    Developed left ptx requiring CT placement 9/28  Glucoses 232-300 last 24 hrs  Wt is up 2 kgs from preop  CXR:  trace right apical ptx, no residual left ptx      Intake/Output Summary (Last 24 hours) at 9/30/2024 1006  Last data filed at 9/30/2024 0802  Gross per 24 hour   Intake 1400 ml   Output 2625 ml   Net -1225 ml     Temp:  [97.5 °F (36.4 °C)-98.6 °F (37 °C)] 97.5 °F (36.4 °C)  Heart Rate:  [67-95] 76  Resp:  [13-25] 13  BP: (100-135)/(59-75) 116/70  L PCT  (for ptx) 150/24 (nothing recorded 11p-7a)    Results from last 7 days   Lab Units 09/30/24  0315 09/29/24  0426 09/28/24  0350 09/27/24  0320 09/26/24  1229 09/26/24  1129 09/26/24  1037   WBC 10*3/mm3 4.58 6.48   < > 6.75   < > 5.82  --    HEMOGLOBIN g/dL 9.4* 9.7*   < > 8.5*   < > 9.6*  --    HEMOGLOBIN, POC   --   --   --   --    < >  --   --    HEMATOCRIT % 30.2* 31.2*   < > 26.9*   < > 29.1*  --    HEMATOCRIT POC   --   --   --   --    < >  --   --    PLATELETS 10*3/mm3 82* 74*   < > 71*   < > 106*  --    INR   --   --   --  1.41*  --  1.24* 1.27*    < > = values in this interval not displayed.     Results from last 7 days   Lab Units 09/30/24  0315 09/28/24  0350 09/27/24  0414 09/27/24  0320   CREATININE mg/dL 0.75*    < > 0.87  --    POTASSIUM mmol/L 4.2   < > 4.3  --    SODIUM mmol/L 137   < > 143  --    MAGNESIUM mg/dL  --   --   --  1.8   PHOSPHORUS mg/dL  --   --  4.1  --     < > = values in this interval not displayed.     Physical Exam:  Neuro intact, nad, up in chair  Tele:  SR 90s  Diminished bases, 94% 1L  Sternotomy/ LLE SVHS healing well  Benign abd, no BM, + flatus  No edema    Assessment/Plan:  Principal Problem:    Coronary artery disease  Active Problems:    Coronary artery disease of bypass graft of native heart with stable angina pectoris    - MV CAD, hx off pump CABG x3 with LIMA to LAD, SVG to distal RCA, SVG to 1st diagonal (Vishnu, 6/2011), EF 50-55% (echo)--s/p reop CABG x3 with SVG to OM1, SVG to OM2, SVG to PDA (Vishnu)  - HTN--stable  - HLD--statin/ fenofibrate, cholestyramine  - DM type 2--followed by Dr. Mendenhall, on Jardiance/ metformin/ U-500 insulin  - Hypertriglyceridemia with hx pancreatitis--last trigly 1056 (3/2024)  - Postop ABLA, expected--watch closely  - Postop TCP, consumptive--watch closely  - [facility]    POD# 4.  Doing well, looks great.  DC wires today.  On asa/statin/bb.  Call out to Endo about glucoses.  Mobilize.  Re-evaluate chest tube later today.  No BM yet, add lactulose later today.    Routine care--as above  D/w pt/nsg, Dr. Mares, endo  Anticipate home at discharge in next 1-2 days    JYOTHI Adames  9/30/2024  10:06 EDT    Electronically signed by Anamika Castle APRN at 09/30/24 1015       Rosario Mendenhall MD at 09/29/24 1212                 Daily Progress Note    Patient Care Team:  Prabhu Downing MD as PCP - General  Prabhu Downing MD as PCP - Family Medicine  Manchi, Ham, MD as Consulting Physician (Cardiology)    Chief Complaint: Follow-up type 2 diabetes    HPI: Patient seen, clinically doing well.  Off IV insulin.  Eating well.  Blood sugars are beginning to rise.    ROS:   Constitutional:  Denies  fatigue, tiredness.    Respiratory: denies cough, shortness of breath.   Cardiovascular:  denies chest pain, edema   GI:  Denies abdominal pain, nausea, vomiting.         Vitals:    09/29/24 1100   BP: 103/59   Pulse: 79   Resp: 25   Temp: 98 °F (36.7 °C)   SpO2: 90%     Body mass index is 26.31 kg/m².    Physical Exam:  GEN: NAD, conversant  PSYCH: Awake and coherent      Results Review:     I reviewed the patient's new clinical results.    Glucose   Date Value Ref Range Status   09/29/2024 114 (H) 65 - 99 mg/dL Final     Sodium   Date Value Ref Range Status   09/29/2024 140 136 - 145 mmol/L Final     Potassium   Date Value Ref Range Status   09/29/2024 4.0 3.5 - 5.2 mmol/L Final     CO2   Date Value Ref Range Status   09/29/2024 32.8 (H) 22.0 - 29.0 mmol/L Final     Chloride   Date Value Ref Range Status   09/29/2024 101 98 - 107 mmol/L Final     Anion Gap   Date Value Ref Range Status   09/29/2024 6.2 5.0 - 15.0 mmol/L Final     Creatinine   Date Value Ref Range Status   09/29/2024 0.65 (L) 0.76 - 1.27 mg/dL Final   09/26/2024 0.76 0.60 - 1.30 mg/dL Final     Comment:     Serial Number: [id number]:  [id number]     BUN   Date Value Ref Range Status   09/29/2024 16 6 - 20 mg/dL Final     BUN/Creatinine Ratio   Date Value Ref Range Status   09/29/2024 24.6 7.0 - 25.0 Final     Calcium   Date Value Ref Range Status   09/29/2024 8.7 8.6 - 10.5 mg/dL Final     Albumin   Date Value Ref Range Status   09/27/2024 4.5 3.5 - 5.2 g/dL Final     Magnesium   Date Value Ref Range Status   09/27/2024 1.8 1.6 - 2.6 mg/dL Final     Phosphorus   Date Value Ref Range Status   09/27/2024 4.1 2.5 - 4.5 mg/dL Final     Lab Results   Component Value Date    HGBA1C 8.70 (H) 03/04/2024    HGBA1C 8.9 (H) 02/10/2023    HGBA1C 9.6 (H) 06/17/2022     Lab Results   Component Value Date    MICROALBUR 2.1 03/04/2024     Results from last 7 days   Lab Units 09/29/24  1137 09/29/24  0749 09/29/24  0547 09/29/24  0438 09/29/24  0250 09/29/24  0144    GLUCOSE mg/dL 241* 147* 129* 127* 128* 115*             Medication Review: Reviewed.     aspirin, 81 mg, Oral, Daily  chlorhexidine, 15 mL, Mouth/Throat, Q12H  [START ON 9/30/2024] enoxaparin, 40 mg, Subcutaneous, Daily  fenofibrate, 145 mg, Oral, Daily  furosemide, 40 mg, Oral, Daily  insulin lispro, 4-24 Units, Subcutaneous, TID With Meals  Insulin Regular Human (Conc), 60 Units, Subcutaneous, TID AC  metoprolol tartrate, 75 mg, Oral, Q12H  mupirocin, , Each Nare, BID  pantoprazole, 40 mg, Oral, Q AM  polyethylene glycol, 17 g, Oral, BID  rosuvastatin, 40 mg, Oral, Nightly  senna-docusate sodium, 2 tablet, Oral, BID              Assessment and plan:  Diabetes mellitus type 2 with hyperglycemia: Uncontrolled, will increase Humulin R U5 insulin to 70 units subcu 3 times a day before meals along with Humalog sliding scale and follow blood sugars.    CAD: Status post CABG    Hypertriglyceridemia:  on rosuvastatin and fenofibrate    Rosario Mendenhall MD. FACE                  Electronically signed by Rosario Mendenhall MD at 09/29/24 1213       Azael Johnson MD at 09/29/24 1152          CARDIOLOGY PROGRESS NOTE:    Rodney Chaney  59 y.o.  male  1965  [id number]      Referring Provider: Cardiac surgeon    Reason for follow-up: Status post coronary artery bypass surgery     Patient Care Team:  Prabhu Downing MD as PCP - General  Prabhu Downing MD as PCP - Family Medicine  Ham Jacinto MD as Consulting Physician (Cardiology)    Subjective .  Patient extubated and sitting in chair comfortably without any symptoms    Objective sitting in chair comfortably     Review of Systems   Constitutional: Negative for fever and malaise/fatigue.   HENT:  Negative for ear pain and nosebleeds.    Eyes:  Negative for blurred vision and double vision.   Cardiovascular:  Negative for chest pain, dyspnea on exertion and palpitations.   Respiratory:  Negative for cough and shortness of breath.    Skin:  Negative for color change  "and rash.   Musculoskeletal:  Negative for back pain and joint pain.   Gastrointestinal:  Negative for abdominal pain, hematemesis, hematochezia, nausea and vomiting.   Genitourinary:  Negative for flank pain and hematuria.   Neurological:  Negative for focal weakness and headaches.   Psychiatric/Behavioral:  Negative for depression. The patient is not nervous/anxious.    All other systems reviewed and are negative.      Allergies: Levofloxacin    Scheduled Meds:aspirin, 81 mg, Oral, Daily  chlorhexidine, 15 mL, Mouth/Throat, Q12H  enoxaparin, 40 mg, Subcutaneous, Daily  fenofibrate, 145 mg, Oral, Daily  furosemide, 40 mg, Oral, Daily  insulin lispro, 4-24 Units, Subcutaneous, TID With Meals  Insulin Regular Human (Conc), 60 Units, Subcutaneous, TID AC  metoprolol tartrate, 75 mg, Oral, Q12H  mupirocin, , Each Nare, BID  pantoprazole, 40 mg, Oral, Q AM  polyethylene glycol, 17 g, Oral, BID  rosuvastatin, 40 mg, Oral, Nightly  senna-docusate sodium, 2 tablet, Oral, BID      Continuous Infusions:     PRN Meds:.  acetaminophen **OR** acetaminophen **OR** acetaminophen    Calcium Replacement - Follow Nurse / BPA Driven Protocol    cyclobenzaprine    dextrose    dextrose    glucagon (human recombinant)    HYDROcodone-acetaminophen    Magnesium Cardiology Dose Replacement - Follow Nurse / BPA Driven Protocol    [] Morphine **AND** naloxone    nitroglycerin    ondansetron    Phosphorus Replacement - Follow Nurse / BPA Driven Protocol    Potassium Replacement - Follow Nurse / BPA Driven Protocol        VITAL SIGNS  Vitals:    24 0900 24 0930 24 1000 24 1100   BP: 119/75  107/67 103/59   BP Location:    Right arm   Patient Position:    Lying   Pulse: 95 75 75 79   Resp:    25   Temp:    98 °F (36.7 °C)   TempSrc:    Oral   SpO2: 97% 100% 93% 90%   Weight:       Height:           Flowsheet Rows      Flowsheet Row First Filed Value   Admission Height 182.9 cm (72.01\") Documented at 2024 " 1250   Admission Weight 85.3 kg (188 lb 0.8 oz) Documented at 09/26/2024 1250             TELEMETRY: Sinus rhythm    Physical Exam:  Constitutional:       Appearance: Well-developed.   Eyes:      General: No scleral icterus.     Conjunctiva/sclera: Conjunctivae normal.      Pupils: Pupils are equal, round, and reactive to light.   HENT:      Head: Normocephalic and atraumatic.   Neck:      Thyroid: No thyromegaly.      Vascular: No carotid bruit or JVD.   Pulmonary:      Effort: Pulmonary effort is normal.      Breath sounds: Normal breath sounds. No wheezing. No rales.   Cardiovascular:      Normal rate. Regular rhythm.   Pulses:     Intact distal pulses.   Edema:     Peripheral edema absent.   Abdominal:      General: Bowel sounds are normal.      Palpations: Abdomen is soft.   Musculoskeletal: Normal range of motion.      Cervical back: Normal range of motion and neck supple. Skin:     General: Skin is warm and dry.      Findings: No rash.   Neurological:      Mental Status: Alert and oriented to person, place, and time.      Comments: No focal deficits          Results Review:   I reviewed the patient's new clinical results.  Lab Results (last 24 hours)       Procedure Component Value Units Date/Time    POC Glucose 4x Daily Before Meals & at Bedtime [[id number]]  (Abnormal) Collected: 09/29/24 1137    Specimen: Blood Updated: 09/29/24 1139     Glucose 241 mg/dL      Comment: Serial Number: [id number]:  [id number]       POC Glucose Once [[id number]]  (Abnormal) Collected: 09/29/24 0547    Specimen: Blood Updated: 09/29/24 1131     Glucose 129 mg/dL      Comment: Serial Number: [id number]:  [id number]       POC Glucose 4x Daily Before Meals & at Bedtime [[id number]]  (Abnormal) Collected: 09/29/24 0749    Specimen: Blood Updated: 09/29/24 0751     Glucose 147 mg/dL      Comment: Serial Number: [id number]:  [id number]       POC Glucose Once [[id number]]  (Abnormal) Collected: 09/29/24 0438     Specimen: Blood Updated: 09/29/24 0547     Glucose 127 mg/dL      Comment: Serial Number: [id number]:  [id number]       Basic Metabolic Panel [[id number]]  (Abnormal) Collected: 09/29/24 0426    Specimen: Blood Updated: 09/29/24 0456     Glucose 114 mg/dL      BUN 16 mg/dL      Creatinine 0.65 mg/dL      Sodium 140 mmol/L      Potassium 4.0 mmol/L      Chloride 101 mmol/L      CO2 32.8 mmol/L      Calcium 8.7 mg/dL      BUN/Creatinine Ratio 24.6     Anion Gap 6.2 mmol/L      eGFR 108.5 mL/min/1.73     Narrative:      GFR Normal >60  Chronic Kidney Disease <60  Kidney Failure <15      CBC (No Diff) [[id number]]  (Abnormal) Collected: 09/29/24 0426    Specimen: Blood Updated: 09/29/24 0446     WBC 6.48 10*3/mm3      RBC 3.50 10*6/mm3      Hemoglobin 9.7 g/dL      Hematocrit 31.2 %      MCV 89.1 fL      MCH 27.7 pg      MCHC 31.1 g/dL      RDW 15.6 %      RDW-SD 50.9 fl      MPV 10.5 fL      Platelets 74 10*3/mm3     POC Glucose Once [[id number]]  (Abnormal) Collected: 09/29/24 0250    Specimen: Blood Updated: 09/29/24 0437     Glucose 128 mg/dL      Comment: Serial Number: [id number]:  [id number]       POC Glucose Once [[id number]]  (Abnormal) Collected: 09/29/24 0019    Specimen: Blood Updated: 09/29/24 0250     Glucose 144 mg/dL      Comment: Serial Number: [id number]:  [id number]       POC Glucose Once [[id number]]  (Abnormal) Collected: 09/29/24 0144    Specimen: Blood Updated: 09/29/24 0146     Glucose 115 mg/dL      Comment: Serial Number: [id number]:  [id number]       POC Glucose Once [[id number]]  (Abnormal) Collected: 09/28/24 2314    Specimen: Blood Updated: 09/28/24 2316     Glucose 156 mg/dL      Comment: Serial Number: [id number]:  [id number]       POC Glucose 4x Daily Before Meals & at Bedtime [[id number]]  (Abnormal) Collected: 09/28/24 2206    Specimen: Blood Updated: 09/28/24 2207     Glucose 174 mg/dL      Comment: Serial Number: [id number]:  [id number]       POC Glucose  Once [[id number]]  (Abnormal) Collected: 09/28/24 2058    Specimen: Blood Updated: 09/28/24 2100     Glucose 200 mg/dL      Comment: Serial Number: [id number]:  [id number]       POC Glucose Once [[id number]]  (Abnormal) Collected: 09/28/24 1917    Specimen: Blood Updated: 09/28/24 1919     Glucose 131 mg/dL      Comment: Serial Number: [id number]:  [id number]       POC Glucose Once [[id number]]  (Abnormal) Collected: 09/28/24 1829    Specimen: Blood Updated: 09/28/24 1833     Glucose 107 mg/dL      Comment: Serial Number: [id number]:  [id number]       POC Glucose Once [[id number]]  (Abnormal) Collected: 09/28/24 1552    Specimen: Blood Updated: 09/28/24 1554     Glucose 200 mg/dL      Comment: Serial Number: [id number]:  [id number]       POC Glucose Once [[id number]]  (Abnormal) Collected: 09/28/24 1435    Specimen: Blood Updated: 09/28/24 1438     Glucose 220 mg/dL      Comment: Serial Number: [id number]:  [id number]       POC Glucose Once [[id number]]  (Abnormal) Collected: 09/28/24 1243    Specimen: Blood Updated: 09/28/24 1244     Glucose 161 mg/dL      Comment: Serial Number: [id number]:  [id number]               Imaging Results (Last 24 Hours)       Procedure Component Value Units Date/Time    XR Chest 1 View [[id number]] Resulted: 09/29/24 0926     Updated: 09/29/24 0933    XR Chest 1 View [[id number]] Collected: 09/29/24 0240     Updated: 09/29/24 0250    Narrative:      Examination: XR CHEST 1 VW-     Date of Exam: 9/28/2024 7:48 PM     Indication: post chest tube removal, mediastinal;  I25.708-Atherosclerosis of coronary artery bypass graft(s), unspecified,  with other forms of angina pectoris.     Comparison: 9/28/2024.     Technique: Single radiographic view of the chest was obtained.     Findings:  Midline chest tubes are removed. Stable left-sided chest tube. No  evidence of a left-sided pneumothorax. There is a tiny right apical  pneumothorax with approximately 3 mm pleural  separation. Lung volumes  remain low and there is stable bilateral perihilar and basilar  atelectasis. Slight stable asymmetric elevation of the left  hemidiaphragm. No definite pleural effusion. There is evidence of prior  median sternotomy and CABG. Cardiac silhouette and pulmonary vasculature  appear unchanged.       Impression:         1. Trace right apical pneumothorax.  2. Stable left-sided chest tube without evidence of left-sided  pneumothorax.  3. Persistent hypoinflation of the lungs with perihilar and basilar  atelectasis.  4. Interval removal of midline chest tubes.     Electronically Signed By-Jj Bright MD On:9/29/2024 2:48 AM       XR Chest 1 View [[id number]] Collected: 09/28/24 1252     Updated: 09/28/24 1257    Narrative:      XR CHEST 1 VW    Date of Exam: 9/28/2024 12:22 PM EDT    Indication: s/p ct insertion left    Comparison: September 28, 2024 at 4:05 a.m.    Findings:  Interval placement of left chest tube. There has been evacuation of the left pneumothorax. No significant pneumothorax is visible at this time. Reexpansion of the left lung. Mild bibasilar opacities, likely atelectasis. No significant right pneumothorax   or new pleural effusion. Mediastinal drains are present. Heart size and mediastinal contour appear unchanged. Status post median sternotomy and CABG.      Impression:      Impression:  1.Interval placement of left chest tube with evacuation of left pneumothorax. No significant pneumothorax is visible at this time.  2.Mild bibasilar opacities, likely atelectasis.        Electronically Signed: Tanner Benavidez    9/28/2024 12:55 PM EDT    Workstation ID: [id number]            EKG      I personally viewed and interpreted the patient's EKG/Telemetry data:    ECHOCARDIOGRAM:  Results for orders placed in visit on 09/26/24    Intra-Op Anesthesia EDER    Narrative  Intra-Op Anesthesia EDER    Procedure Performed: Intra-Op Anesthesia EDER  Start Time:  End Time:    Preanesthesia  Checklist:  Patient identified, IV assessed, risks and benefits discussed, monitors and equipment assessed, procedure being performed at surgeon's request and anesthesia consent obtained.    General Procedure Information  EDER Placed for monitoring purposes only -- This is not a diagnostic EDER  Diagnostic Indications for Echo:  hemodynamic monitoring  Location performed:  OR  Intubated  Bite block placed  Heart visualized  Probe Insertion:  Easy  Probe Type:  Biplane  Modalities:  Color flow mapping, continuous wave Doppler and 2D only    Echocardiographic and Doppler Measurements    Ventricles    Right Ventricle:  Cavity size normal.  Hypertrophy not present.  Global function normal.  Left Ventricle:  Cavity size normal.  Global Function normal.  Ejection Fraction 55%.        Valves    Aortic Valve:  Regurgitation absent.  Leaflet motions normal.    Mitral Valve:  Annulus normal.  Regurgitation trace.  Leaflets normal.  Leaflet motions normal.    Tricuspid Valve:  Regurgitation absent.  Leaflet motions normal.      Aorta    Ascending Aorta:  Size normal.  Mobile plaque not present.  Aortic Arch:  Size normal.  Mobile plaque not present.  Descending Aorta:  Size normal.  Mobile plaque not present.      Atria    Right Atrium:  Size normal.  Left Atrium:  Size normal.                Anesthesia Information  Performed Personally  Anesthesiologist:  Benson Fisher MD      Echocardiogram Comments:  Pre CPB:         Trace MR, EF 55%  Post CPB:    no change       STRESS MYOVIEW:  Results for orders placed during the hospital encounter of 08/20/24    Stress Test With Myocardial Perfusion - One Day    Interpretation Summary    Impressions are consistent with a high risk study.    Left ventricular ejection fraction is mildly reduced (Calculated EF = 43%).    Myocardial perfusion imaging indicates a large-sized, severe area of ischemia located in the inferior wall and lateral wall.       CARDIAC CATHETERIZATION:  Results for  orders placed during the hospital encounter of 08/30/24    Cardiac Catheterization/Vascular Study       OTHER:   Denies any complaint  Denies any chest pain  Clinically Premcal stable  Maintaining sinus rhythm  Tmax is 98 pulse is 79 respirations are 24 blood pressure is 102/59 sats are 90%  Sodium is 140 potassium is 4.0 creatinine 0.6 hemoglobin is 9.7      Assessment & Plan     Coronary artery disease  Patient underwent redo coronary bypass surgery with SVG to the PDA OM and diagonal branches but have also has a LIMA from the previous surgery which is patent to the LAD  Hypertension  Patient blood pressure currently stable and is on oral medicines  Hyperlipidemia  Patient patient is started on statins  Diabetes  Patient is on insulin and will be managed by the primary care doctor.  Current medications include aspirin 81 mg p.o. once a day patient is on Tricor 145 mg p.o. once a day metoprolol 50 mg p.o. twice daily patient is on Crestor 40 mg p.o. once a day patient is currently on Lovenox for DVT prophylaxis  Postop day 3  Redo CABG with a LIMA to the LAD vein graft to the distal RCA vein graft to the first diagonal  Normal LV systolic function  Patient is currently clinically stable  Continue current medical therapy  Patient is currently clinically hemodynamically stable  Further recommendation based on patient course        Azael Johnson MD  09/29/24  11:53 EDT                Electronically signed by Azael Johnson MD at 09/29/24 1154       Toby Harden MD at 09/29/24 0943           LOS: 3 days   Patient Care Team:  Prabhu Downing MD as PCP - General  Prabhu Downing MD as PCP - Family Medicine  Ham Jacinto MD as Consulting Physician (Cardiology)    Chief Complaint:       Subjective      Vital Signs  Temp:  [97.6 °F (36.4 °C)-98.7 °F (37.1 °C)] 98 °F (36.7 °C)  Heart Rate:  [] 82  Resp:  [15-29] 22  BP: (110-160)/(64-83) 133/71  Body mass index is 26.31  "kg/m².    Intake/Output Summary (Last 24 hours) at 9/29/2024 0915  Last data filed at 9/29/2024 0549  Gross per 24 hour   Intake 1022 ml   Output 2654 ml   Net -1632 ml     No intake/output data recorded.      Wt Readings from Last 3 Encounters:   09/28/24 88 kg (194 lb)   09/23/24 85.3 kg (188 lb)   08/30/24 83.9 kg (185 lb)         Objective:  Vital signs: (most recent): Blood pressure 133/71, pulse 82, temperature 98 °F (36.7 °C), temperature source Oral, resp. rate 22, height 182.9 cm (72.01\"), weight 88 kg (194 lb), SpO2 97%.                Results Review:        WBC No results found for: \"WBCS\"   HGB Hemoglobin   Date Value Ref Range Status   09/29/2024 9.7 (L) 13.0 - 17.7 g/dL Final   09/28/2024 11.0 (L) 13.0 - 17.7 g/dL Final   09/27/2024 8.5 (L) 13.0 - 17.7 g/dL Final     Comment:     Result checked     09/26/2024 10.9 (L) 12.0 - 17.0 g/dL Final   09/26/2024 11.6 (L) 12.0 - 17.0 g/dL Final   09/26/2024 11.2 (L) 13.0 - 17.7 g/dL Final   09/26/2024 11.8 (L) 12.0 - 17.0 g/dL Final   09/26/2024 11.7 (L) 12.0 - 17.0 g/dL Final   09/26/2024 11.4 (L) 12.0 - 17.0 g/dL Final   09/26/2024 9.6 (L) 13.0 - 17.7 g/dL Final   09/26/2024 10.2 (L) 12.0 - 17.0 g/dL Final   09/26/2024 8.5 (L) 13.0 - 17.7 g/dL Final   09/26/2024 11.2 (L) 12.0 - 17.0 g/dL Final   09/26/2024 11.2 (L) 12.0 - 17.0 g/dL Final      HCT Hematocrit   Date Value Ref Range Status   09/29/2024 31.2 (L) 37.5 - 51.0 % Final   09/28/2024 36.0 (L) 37.5 - 51.0 % Final   09/27/2024 26.9 (L) 37.5 - 51.0 % Final   09/26/2024 32 (L) 38 - 51 % Final   09/26/2024 34 (L) 38 - 51 % Final   09/26/2024 34.4 (L) 37.5 - 51.0 % Final   09/26/2024 35 (L) 38 - 51 % Final   09/26/2024 35 (L) 38 - 51 % Final   09/26/2024 33 (L) 38 - 51 % Final   09/26/2024 29.1 (L) 37.5 - 51.0 % Final   09/26/2024 30 (L) 38 - 51 % Final   09/26/2024 26.0 (L) 37.5 - 51.0 % Final   09/26/2024 33 (L) 38 - 51 % Final   09/26/2024 33 (L) 38 - 51 % Final      Platelets No results found for: " "\"LABPLAT\"     PT/INR:    Protime   Date Value Ref Range Status   09/27/2024 15.0 (H) 9.6 - 11.7 Seconds Final   09/26/2024 13.3 (H) 9.6 - 11.7 Seconds Final   09/26/2024 13.6 (H) 9.6 - 11.7 Seconds Final   /  INR   Date Value Ref Range Status   09/27/2024 1.41 (H) 0.93 - 1.10 Final   09/26/2024 1.24 (H) 0.93 - 1.10 Final   09/26/2024 1.27 (H) 0.93 - 1.10 Final       Sodium Sodium   Date Value Ref Range Status   09/29/2024 140 136 - 145 mmol/L Final   09/28/2024 141 136 - 145 mmol/L Final   09/27/2024 143 136 - 145 mmol/L Final   09/26/2024 139 136 - 145 mmol/L Final      Potassium Potassium   Date Value Ref Range Status   09/29/2024 4.0 3.5 - 5.2 mmol/L Final   09/28/2024 4.1 3.5 - 5.2 mmol/L Final   09/27/2024 4.3 3.5 - 5.2 mmol/L Final   09/26/2024 3.6 3.5 - 5.2 mmol/L Final   09/26/2024 4.1 3.5 - 5.2 mmol/L Final      Chloride Chloride   Date Value Ref Range Status   09/29/2024 101 98 - 107 mmol/L Final   09/28/2024 106 98 - 107 mmol/L Final   09/27/2024 113 (H) 98 - 107 mmol/L Final   09/26/2024 105 98 - 107 mmol/L Final      Bicarbonate No results found for: \"PLASMABICARB\"   BUN BUN   Date Value Ref Range Status   09/29/2024 16 6 - 20 mg/dL Final   09/28/2024 13 6 - 20 mg/dL Final   09/27/2024 13 6 - 20 mg/dL Final   09/26/2024 15 6 - 20 mg/dL Final      Creatinine Creatinine   Date Value Ref Range Status   09/29/2024 0.65 (L) 0.76 - 1.27 mg/dL Final   09/28/2024 0.89 0.76 - 1.27 mg/dL Final   09/27/2024 0.87 0.76 - 1.27 mg/dL Final   09/26/2024 0.76 0.60 - 1.30 mg/dL Final     Comment:     Serial Number: [id number]:  [id number]   09/26/2024 0.98 0.76 - 1.27 mg/dL Final      Calcium Calcium   Date Value Ref Range Status   09/29/2024 8.7 8.6 - 10.5 mg/dL Final   09/28/2024 8.6 8.6 - 10.5 mg/dL Final   09/27/2024 8.7 8.6 - 10.5 mg/dL Final   09/26/2024 8.9 8.6 - 10.5 mg/dL Final      Magnesium Magnesium   Date Value Ref Range Status   09/27/2024 1.8 1.6 - 2.6 mg/dL Final         .imag    aspirin, 81 mg, Oral, " Daily  chlorhexidine, 15 mL, Mouth/Throat, Q12H  enoxaparin, 40 mg, Subcutaneous, Daily  fenofibrate, 145 mg, Oral, Daily  insulin lispro, 4-24 Units, Subcutaneous, TID With Meals  Insulin Regular Human (Conc), 60 Units, Subcutaneous, TID AC  metoprolol tartrate, 50 mg, Oral, Q12H  mupirocin, , Each Nare, BID  pantoprazole, 40 mg, Oral, Q AM  polyethylene glycol, 17 g, Oral, BID  rosuvastatin, 40 mg, Oral, Nightly  senna-docusate sodium, 2 tablet, Oral, BID      insulin, 0-100 Units/hr, Last Rate: Stopped (09/29/24 0848)  sodium chloride, 30 mL/hr, Last Rate: 30 mL/hr (09/26/24 1151)            Coronary artery disease    Coronary artery disease of bypass graft of native heart with stable angina pectoris      Assessment & Plan    - MV CAD, hx off pump CABG x3 with LIMA to LAD, SVG to distal RCA, SVG to 1st diagonal (Vishnu, 6/2011), EF 50-55% (echo)--s/p reop CABG no op note available at this time ([facility])  - HTN--stable  - HLD--statin/ fenofibrate, cholestyramine  - DM type 2--followed by Dr. Mendenhall, on Jardiance/ metformin/ U-500 insulin  - Hypertriglyceridemia with hx pancreatitis--last trigly 1056 (3/2024)  - Postop ABLA, expected--watch closely  - Postop TCP, consumptive--watch closely     POD# 3  Doing well. Had a left pneumothorax yesterday. CT placed. No CXR today yet. On asa/statin/bb.  Increase Lopressor to 75 (he takes 75 at home).  Repeat  Diurese.  Endo consulted for DM mmgt.  Mobilize. Platelets low. Lasix PO. Anticipate home at discharge    Toby Harden MD  09/29/24  09:15 EDT             Electronically signed by Toby Harden MD at 09/29/24 0921       Rosario Mendenhall MD at 09/28/24 1342                 Daily Progress Note    Patient Care Team:  Prabhu Downing MD as PCP - General  Prabhu Downing MD as PCP - Family Medicine  Ham Jacinto MD as Consulting Physician (Cardiology)    Chief Complaint: Follow-up type 2 diabetes    HPI: Patient seen, clinically doing well.  Eating fair.   Currently on IV insulin.        ROS:   Constitutional:  Denies fatigue, tiredness.    Respiratory: denies cough, shortness of breath.   Cardiovascular:  denies chest pain, edema   GI:  Denies abdominal pain, nausea, vomiting.         Vitals:    09/28/24 1200   BP:    Pulse:    Resp: (!) 29   Temp: 98 °F (36.7 °C)   SpO2:      Body mass index is 26.31 kg/m².    Physical Exam:  GEN: NAD, conversant  PSYCH: Awake and coherent      Results Review:     I reviewed the patient's new clinical results.    Glucose   Date Value Ref Range Status   09/28/2024 119 (H) 65 - 99 mg/dL Final     Sodium   Date Value Ref Range Status   09/28/2024 141 136 - 145 mmol/L Final     Potassium   Date Value Ref Range Status   09/28/2024 4.1 3.5 - 5.2 mmol/L Final     CO2   Date Value Ref Range Status   09/28/2024 26.3 22.0 - 29.0 mmol/L Final     Chloride   Date Value Ref Range Status   09/28/2024 106 98 - 107 mmol/L Final     Anion Gap   Date Value Ref Range Status   09/28/2024 8.7 5.0 - 15.0 mmol/L Final     Creatinine   Date Value Ref Range Status   09/28/2024 0.89 0.76 - 1.27 mg/dL Final   09/26/2024 0.76 0.60 - 1.30 mg/dL Final     Comment:     Serial Number: [id number]:  [id number]     BUN   Date Value Ref Range Status   09/28/2024 13 6 - 20 mg/dL Final     BUN/Creatinine Ratio   Date Value Ref Range Status   09/28/2024 14.6 7.0 - 25.0 Final     Calcium   Date Value Ref Range Status   09/28/2024 8.6 8.6 - 10.5 mg/dL Final     Albumin   Date Value Ref Range Status   09/27/2024 4.5 3.5 - 5.2 g/dL Final     Magnesium   Date Value Ref Range Status   09/27/2024 1.8 1.6 - 2.6 mg/dL Final     Phosphorus   Date Value Ref Range Status   09/27/2024 4.1 2.5 - 4.5 mg/dL Final     Lab Results   Component Value Date    HGBA1C 8.70 (H) 03/04/2024    HGBA1C 8.9 (H) 02/10/2023    HGBA1C 9.6 (H) 06/17/2022     Lab Results   Component Value Date    MICROALBUR 2.1 03/04/2024     Results from last 7 days   Lab Units 09/28/24  1243 09/28/24  1138  09/28/24  0951 09/28/24  0832 09/28/24  0641 09/28/24  0522   GLUCOSE mg/dL 161* 220* 221* 184* 169* 154*             Medication Review: Reviewed.     aspirin, 81 mg, Oral, Daily  chlorhexidine, 15 mL, Mouth/Throat, Q12H  [START ON 9/29/2024] enoxaparin, 40 mg, Subcutaneous, Daily  fenofibrate, 145 mg, Oral, Daily  furosemide, 40 mg, Intravenous, Once  lidocaine, 10 mL, Subcutaneous, Once  metoprolol tartrate, 50 mg, Oral, Q12H  mupirocin, , Each Nare, BID  pantoprazole, 40 mg, Oral, Q AM  polyethylene glycol, 17 g, Oral, BID  rosuvastatin, 40 mg, Oral, Nightly  senna-docusate sodium, 2 tablet, Oral, BID              Assessment and plan:  Diabetes mellitus type 2 with hyperglycemia: Uncontrolled, currently on IV insulin per CABG protocol.  Will start U-500 send 60 units subcu 3 times a day before each meal from tomorrow along with Humalog sliding scale follow blood sugars.  He also was on Jardiance at home which will be added later.    CAD: Status post CABG    Hypertriglyceridemia: Currently on fenofibrate with rosuvastatin.      Rosario Mendenhall MD. FACE                  Electronically signed by Rosario Mendenhall MD at 09/28/24 1343       Azael Johnson MD at 09/28/24 1044          CARDIOLOGY PROGRESS NOTE:    Rodney Chaney  59 y.o.  male  1965  [id number]      Referring Provider: Cardiac surgeon    Reason for follow-up: Status post coronary artery bypass surgery     Patient Care Team:  Prabhu Downing MD as PCP - General  Prabhu Downing MD as PCP - Family Medicine  Ham Jacinto MD as Consulting Physician (Cardiology)    Subjective .  Patient extubated and sitting in chair comfortably without any symptoms    Objective sitting in chair comfortably     Review of Systems   Constitutional: Negative for fever and malaise/fatigue.   HENT:  Negative for ear pain and nosebleeds.    Eyes:  Negative for blurred vision and double vision.   Cardiovascular:  Negative for chest pain, dyspnea on exertion and  palpitations.   Respiratory:  Negative for cough and shortness of breath.    Skin:  Negative for rash.   Musculoskeletal:  Negative for joint pain.   Gastrointestinal:  Negative for abdominal pain, nausea and vomiting.   Neurological:  Negative for focal weakness and headaches.   Psychiatric/Behavioral:  Negative for depression. The patient is not nervous/anxious.    All other systems reviewed and are negative.      Allergies: Levofloxacin    Scheduled Meds:aspirin, 81 mg, Oral, Daily  chlorhexidine, 15 mL, Mouth/Throat, Q12H  [START ON 9/29/2024] enoxaparin, 40 mg, Subcutaneous, Daily  fenofibrate, 145 mg, Oral, Daily  furosemide, 40 mg, Intravenous, Once  metoprolol tartrate, 25 mg, Oral, Once  metoprolol tartrate, 50 mg, Oral, Q12H  mupirocin, , Each Nare, BID  pantoprazole, 40 mg, Oral, Q AM  polyethylene glycol, 17 g, Oral, BID  rosuvastatin, 40 mg, Oral, Nightly  senna-docusate sodium, 2 tablet, Oral, BID      Continuous Infusions:insulin, 0-100 Units/hr, Last Rate: 2.2 Units/hr (09/28/24 0642)  sodium chloride, 30 mL/hr, Last Rate: 30 mL/hr (09/26/24 1151)      PRN Meds:.  acetaminophen **OR** acetaminophen **OR** acetaminophen    Calcium Replacement - Follow Nurse / BPA Driven Protocol    cyclobenzaprine    dextrose    dextrose    glucagon (human recombinant)    HYDROcodone-acetaminophen    Magnesium Cardiology Dose Replacement - Follow Nurse / BPA Driven Protocol    Morphine **AND** naloxone    nitroglycerin    ondansetron    Phosphorus Replacement - Follow Nurse / BPA Driven Protocol    Potassium Replacement - Follow Nurse / BPA Driven Protocol        VITAL SIGNS  Vitals:    09/28/24 0730 09/28/24 0801 09/28/24 0900 09/28/24 0930   BP: 128/68 136/76 113/79 127/64   BP Location:       Patient Position:  Sitting     Pulse: 83 101 94 90   Resp:  25     Temp:  97.6 °F (36.4 °C)     TempSrc:  Oral     SpO2: 94% 90% 91% 95%   Weight:       Height:           Flowsheet Rows      Flowsheet Row First Filed Value  "  Admission Height 182.9 cm (72.01\") Documented at 09/26/2024 1250   Admission Weight 85.3 kg (188 lb 0.8 oz) Documented at 09/26/2024 1250             TELEMETRY: Sinus rhythm    Physical Exam:  Constitutional:       Appearance: Well-developed.   Eyes:      General: No scleral icterus.     Conjunctiva/sclera: Conjunctivae normal.      Pupils: Pupils are equal, round, and reactive to light.   HENT:      Head: Normocephalic and atraumatic.   Neck:      Vascular: No carotid bruit or JVD.   Pulmonary:      Effort: Pulmonary effort is normal.      Breath sounds: Normal breath sounds. No wheezing. No rales.   Cardiovascular:      Normal rate. Regular rhythm.   Pulses:     Intact distal pulses.   Abdominal:      General: Bowel sounds are normal.      Palpations: Abdomen is soft.   Musculoskeletal: Normal range of motion.      Cervical back: Normal range of motion and neck supple. Skin:     General: Skin is warm and dry.      Findings: No rash.   Neurological:      Mental Status: Alert.      Comments: No focal deficits          Results Review:   I reviewed the patient's new clinical results.  Lab Results (last 24 hours)       Procedure Component Value Units Date/Time    POC Glucose Once [[id number]]  (Abnormal) Collected: 09/28/24 0951    Specimen: Blood Updated: 09/28/24 0953     Glucose 221 mg/dL      Comment: Serial Number: [id number]:  [id number]       POC Glucose Once [[id number]]  (Abnormal) Collected: 09/28/24 0832    Specimen: Blood Updated: 09/28/24 0834     Glucose 184 mg/dL      Comment: Serial Number: [id number]:  [id number]       POC Glucose Once [[id number]]  (Abnormal) Collected: 09/28/24 0641    Specimen: Blood Updated: 09/28/24 0643     Glucose 169 mg/dL      Comment: Serial Number: [id number]:  [id number]       POC Glucose Once [[id number]]  (Abnormal) Collected: 09/28/24 0522    Specimen: Blood Updated: 09/28/24 0523     Glucose 154 mg/dL      Comment: Serial Number: [id number]:  " [id number]       Basic Metabolic Panel [[id number]]  (Abnormal) Collected: 09/28/24 0350    Specimen: Blood Updated: 09/28/24 0422     Glucose 119 mg/dL      BUN 13 mg/dL      Creatinine 0.89 mg/dL      Sodium 141 mmol/L      Potassium 4.1 mmol/L      Chloride 106 mmol/L      CO2 26.3 mmol/L      Calcium 8.6 mg/dL      BUN/Creatinine Ratio 14.6     Anion Gap 8.7 mmol/L      eGFR 98.7 mL/min/1.73     Narrative:      GFR Normal >60  Chronic Kidney Disease <60  Kidney Failure <15      CBC (No Diff) [[id number]]  (Abnormal) Collected: 09/28/24 0350    Specimen: Blood Updated: 09/28/24 0401     WBC 8.63 10*3/mm3      RBC 3.97 10*6/mm3      Hemoglobin 11.0 g/dL      Hematocrit 36.0 %      MCV 90.7 fL      MCH 27.7 pg      MCHC 30.6 g/dL      RDW 15.8 %      RDW-SD 52.8 fl      MPV 10.9 fL      Platelets 77 10*3/mm3     POC Glucose Once [[id number]]  (Abnormal) Collected: 09/28/24 0342    Specimen: Blood Updated: 09/28/24 0344     Glucose 132 mg/dL      Comment: Serial Number: [id number]:  [id number]       POC Glucose Once [[id number]]  (Abnormal) Collected: 09/28/24 0257    Specimen: Blood Updated: 09/28/24 0259     Glucose 108 mg/dL      Comment: Serial Number: [id number]:  [id number]       POC Glucose Once [[id number]]  (Abnormal) Collected: 09/28/24 0133    Specimen: Blood Updated: 09/28/24 0135     Glucose 138 mg/dL      Comment: Serial Number: [id number]:  [id number]       POC Glucose Once [[id number]]  (Abnormal) Collected: 09/27/24 2325    Specimen: Blood Updated: 09/27/24 2326     Glucose 144 mg/dL      Comment: Serial Number: [id number]:  [id number]       POC Glucose Once [[id number]]  (Abnormal) Collected: 09/27/24 2230    Specimen: Blood Updated: 09/27/24 2232     Glucose 150 mg/dL      Comment: Serial Number: [id number]:  [id number]       POC Glucose Once [[id number]]  (Abnormal) Collected: 09/27/24 2052    Specimen: Blood Updated: 09/27/24 2054     Glucose 176 mg/dL      Comment: Serial  Number: [id number]:  [id number]       POC Glucose Once [[id number]]  (Abnormal) Collected: 09/27/24 1928    Specimen: Blood Updated: 09/27/24 2052     Glucose 221 mg/dL      Comment: Serial Number: [id number]:  [id number]       POC Glucose Once [[id number]]  (Abnormal) Collected: 09/27/24 1833    Specimen: Blood Updated: 09/27/24 1835     Glucose 182 mg/dL      Comment: Serial Number: [id number]:  [id number]       POC Glucose Once [[id number]]  (Abnormal) Collected: 09/27/24 1731    Specimen: Blood Updated: 09/27/24 1733     Glucose 126 mg/dL      Comment: Serial Number: [id number]:  [id number]       POC Glucose Once [[id number]]  (Abnormal) Collected: 09/27/24 1626    Specimen: Blood Updated: 09/27/24 1628     Glucose 159 mg/dL      Comment: Serial Number: [id number]:  [id number]       POC Glucose Once [[id number]]  (Abnormal) Collected: 09/27/24 1520    Specimen: Blood Updated: 09/27/24 1521     Glucose 165 mg/dL      Comment: Serial Number: [id number]:  [id number]       POC Glucose Once [[id number]]  (Abnormal) Collected: 09/27/24 1412    Specimen: Blood Updated: 09/27/24 1413     Glucose 245 mg/dL      Comment: Serial Number: [id number]:  [id number]       POC Glucose Once [[id number]]  (Abnormal) Collected: 09/27/24 1301    Specimen: Blood Updated: 09/27/24 1302     Glucose 171 mg/dL      Comment: Serial Number: [id number]:  [id number]       POC Glucose Once [[id number]]  (Abnormal) Collected: 09/27/24 1157    Specimen: Blood Updated: 09/27/24 1159     Glucose 135 mg/dL      Comment: Serial Number: [id number]:  [id number]       POC Glucose Once [[id number]]  (Abnormal) Collected: 09/27/24 1059    Specimen: Blood Updated: 09/27/24 1102     Glucose 154 mg/dL      Comment: Serial Number: [id number]:  [id number]       POC Glucose Once [[id number]]  (Abnormal) Collected: 09/27/24 0651    Specimen: Blood Updated: 09/27/24 1102     Glucose 162 mg/dL      Comment: Serial  Number: [id number]:  [id number]       POC Glucose Once [[id number]]  (Abnormal) Collected: 09/27/24 1047    Specimen: Blood Updated: 09/27/24 1049     Glucose 165 mg/dL      Comment: Serial Number: [id number]:  [id number]               Imaging Results (Last 24 Hours)       Procedure Component Value Units Date/Time    XR Chest 1 View [[id number]] Collected: 09/28/24 0950     Updated: 09/28/24 1001    Narrative:      XR CHEST 1 VW    Date of Exam: 9/28/2024 4:00 AM EDT    Indication: Post-Op Heart Surgery    Comparison: September 27, 2024, and prior.    Findings:  There appears to be a moderate to large left pneumothorax which is an interval change. Wool[place]-Ari catheter has been removed. There appear to be mediastinal drains. No significant right pneumothorax or pleural effusion. Heart size appears unchanged. Status   post median sternotomy and CABG. There is suspected atelectasis in the left lung. No definite the right lung infiltrate.      Impression:      Impression:  1.Moderate to large left pneumothorax. Chest tube placement is recommended.  2.Suspected atelectasis in the left lung.  3.Removal of Woolford-Ari catheter.    Results were called to the patient's nurse in the [facility] by Dr. Benavidez at 9/28/2024 9:59 AM EDT. Patient's providers will be notified immediately.        Electronically Signed: Tanner Benavidez    9/28/2024 9:59 AM EDT    Workstation ID: [id number]            EKG      I personally viewed and interpreted the patient's EKG/Telemetry data:    ECHOCARDIOGRAM:  Results for orders placed in visit on 09/26/24    Intra-Op Anesthesia EDER    Narrative  Intra-Op Anesthesia EDER    Procedure Performed: Intra-Op Anesthesia EDER  Start Time:  End Time:    Preanesthesia Checklist:  Patient identified, IV assessed, risks and benefits discussed, monitors and equipment assessed, procedure being performed at surgeon's request and anesthesia consent obtained.    General Procedure Information  EDER Placed for monitoring  purposes only -- This is not a diagnostic EDER  Diagnostic Indications for Echo:  hemodynamic monitoring  Location performed:  OR  Intubated  Bite block placed  Heart visualized  Probe Insertion:  Easy  Probe Type:  Biplane  Modalities:  Color flow mapping, continuous wave Doppler and 2D only    Echocardiographic and Doppler Measurements    Ventricles    Right Ventricle:  Cavity size normal.  Hypertrophy not present.  Global function normal.  Left Ventricle:  Cavity size normal.  Global Function normal.  Ejection Fraction 55%.        Valves    Aortic Valve:  Regurgitation absent.  Leaflet motions normal.    Mitral Valve:  Annulus normal.  Regurgitation trace.  Leaflets normal.  Leaflet motions normal.    Tricuspid Valve:  Regurgitation absent.  Leaflet motions normal.      Aorta    Ascending Aorta:  Size normal.  Mobile plaque not present.  Aortic Arch:  Size normal.  Mobile plaque not present.  Descending Aorta:  Size normal.  Mobile plaque not present.      Atria    Right Atrium:  Size normal.  Left Atrium:  Size normal.                Anesthesia Information  Performed Personally  Anesthesiologist:  Benson Fisher MD      Echocardiogram Comments:  Pre CPB:         Trace MR, EF 55%  Post CPB:    no change       STRESS MYOVIEW:  Results for orders placed during the hospital encounter of 08/20/24    Stress Test With Myocardial Perfusion - One Day    Interpretation Summary    Impressions are consistent with a high risk study.    Left ventricular ejection fraction is mildly reduced (Calculated EF = 43%).    Myocardial perfusion imaging indicates a large-sized, severe area of ischemia located in the inferior wall and lateral wall.       CARDIAC CATHETERIZATION:  Results for orders placed during the hospital encounter of 08/30/24    Cardiac Catheterization/Vascular Study       OTHER:     Tmax is 98 pulse is 90 respirations are 20 blood pressure is 140/74 sats are 94%  Sodium is 141 potassium is 4.1 creatinine 0.8  hemoglobin is 11    Assessment & Plan     Coronary artery disease  Patient underwent redo coronary bypass surgery with SVG to the PDA OM and diagonal branches but have also has a LIMA from the previous surgery which is patent to the LAD  Hypertension  Patient blood pressure currently stable and is on oral medicines  Hyperlipidemia  Patient patient is started on statins  Diabetes  Patient is on insulin and will be managed by the primary care doctor.  Current medications include aspirin 81 mg p.o. once a day patient is on Tricor 145 mg p.o. once a day metoprolol 50 mg p.o. twice daily patient is on Crestor 40 mg p.o. once a day patient is currently on Lovenox for DVT prophylaxis  Postop day 2  Redo CABG with a LIMA to the LAD vein graft to the distal RCA vein graft to the first diagonal  Normal LV systolic function  Patient is currently clinically stable  Continue current medical therapy  Patient is currently clinically hemodynamically stable  Further recommendation based on patient course        Azael Johnson MD  09/28/24  10:44 EDT                Electronically signed by Azael Johnson MD at 09/28/24 1455       Toby Harden MD at 09/28/24 0909           LOS: 2 days   Patient Care Team:  Prabhu Downing MD as PCP - General  Prabhu Downing MD as PCP - Family Medicine  Ham Jacinto MD as Consulting Physician (Cardiology)    Chief Complaint:       Subjective      Vital Signs  Temp:  [97.6 °F (36.4 °C)-99 °F (37.2 °C)] 97.6 °F (36.4 °C)  Heart Rate:  [] 101  Resp:  [21-29] 25  BP: (113-144)/(61-83) 136/76  Body mass index is 26.31 kg/m².    Intake/Output Summary (Last 24 hours) at 9/28/2024 0909  Last data filed at 9/28/2024 0833  Gross per 24 hour   Intake 1715 ml   Output 3025 ml   Net -1310 ml     I/O this shift:  In: -   Out: 250 [Urine:250]      Wt Readings from Last 3 Encounters:   09/28/24 88 kg (194 lb)   09/23/24 85.3 kg (188 lb)   08/30/24 83.9 kg (185 lb)  "        Objective:  Vital signs: (most recent): Blood pressure 136/76, pulse 101, temperature 97.6 °F (36.4 °C), temperature source Oral, resp. rate 25, height 182.9 cm (72.01\"), weight 88 kg (194 lb), SpO2 90%.                Results Review:        WBC No results found for: \"WBCS\"   HGB Hemoglobin   Date Value Ref Range Status   09/28/2024 11.0 (L) 13.0 - 17.7 g/dL Final   09/27/2024 8.5 (L) 13.0 - 17.7 g/dL Final     Comment:     Result checked     09/26/2024 10.9 (L) 12.0 - 17.0 g/dL Final   09/26/2024 11.6 (L) 12.0 - 17.0 g/dL Final   09/26/2024 11.2 (L) 13.0 - 17.7 g/dL Final   09/26/2024 11.8 (L) 12.0 - 17.0 g/dL Final   09/26/2024 11.7 (L) 12.0 - 17.0 g/dL Final   09/26/2024 11.4 (L) 12.0 - 17.0 g/dL Final   09/26/2024 9.6 (L) 13.0 - 17.7 g/dL Final   09/26/2024 10.2 (L) 12.0 - 17.0 g/dL Final   09/26/2024 8.5 (L) 13.0 - 17.7 g/dL Final   09/26/2024 11.2 (L) 12.0 - 17.0 g/dL Final   09/26/2024 11.2 (L) 12.0 - 17.0 g/dL Final   09/26/2024 11.6 (L) 12.0 - 17.0 g/dL Final   09/26/2024 11.2 (L) 12.0 - 17.0 g/dL Final   09/26/2024 13.9 12.0 - 17.0 g/dL Final      HCT Hematocrit   Date Value Ref Range Status   09/28/2024 36.0 (L) 37.5 - 51.0 % Final   09/27/2024 26.9 (L) 37.5 - 51.0 % Final   09/26/2024 32 (L) 38 - 51 % Final   09/26/2024 34 (L) 38 - 51 % Final   09/26/2024 34.4 (L) 37.5 - 51.0 % Final   09/26/2024 35 (L) 38 - 51 % Final   09/26/2024 35 (L) 38 - 51 % Final   09/26/2024 33 (L) 38 - 51 % Final   09/26/2024 29.1 (L) 37.5 - 51.0 % Final   09/26/2024 30 (L) 38 - 51 % Final   09/26/2024 26.0 (L) 37.5 - 51.0 % Final   09/26/2024 33 (L) 38 - 51 % Final   09/26/2024 33 (L) 38 - 51 % Final   09/26/2024 34 (L) 38 - 51 % Final   09/26/2024 33 (L) 38 - 51 % Final   09/26/2024 41 38 - 51 % Final      Platelets No results found for: \"LABPLAT\"     PT/INR:    Protime   Date Value Ref Range Status   09/27/2024 15.0 (H) 9.6 - 11.7 Seconds Final   09/26/2024 13.3 (H) 9.6 - 11.7 Seconds Final   09/26/2024 13.6 (H) 9.6 " "- 11.7 Seconds Final   /  INR   Date Value Ref Range Status   09/27/2024 1.41 (H) 0.93 - 1.10 Final   09/26/2024 1.24 (H) 0.93 - 1.10 Final   09/26/2024 1.27 (H) 0.93 - 1.10 Final       Sodium Sodium   Date Value Ref Range Status   09/28/2024 141 136 - 145 mmol/L Final   09/27/2024 143 136 - 145 mmol/L Final   09/26/2024 139 136 - 145 mmol/L Final      Potassium Potassium   Date Value Ref Range Status   09/28/2024 4.1 3.5 - 5.2 mmol/L Final   09/27/2024 4.3 3.5 - 5.2 mmol/L Final   09/26/2024 3.6 3.5 - 5.2 mmol/L Final   09/26/2024 4.1 3.5 - 5.2 mmol/L Final      Chloride Chloride   Date Value Ref Range Status   09/28/2024 106 98 - 107 mmol/L Final   09/27/2024 113 (H) 98 - 107 mmol/L Final   09/26/2024 105 98 - 107 mmol/L Final      Bicarbonate No results found for: \"PLASMABICARB\"   BUN BUN   Date Value Ref Range Status   09/28/2024 13 6 - 20 mg/dL Final   09/27/2024 13 6 - 20 mg/dL Final   09/26/2024 15 6 - 20 mg/dL Final      Creatinine Creatinine   Date Value Ref Range Status   09/28/2024 0.89 0.76 - 1.27 mg/dL Final   09/27/2024 0.87 0.76 - 1.27 mg/dL Final   09/26/2024 0.76 0.60 - 1.30 mg/dL Final     Comment:     Serial Number: [id number]:  [id number]   09/26/2024 0.98 0.76 - 1.27 mg/dL Final      Calcium Calcium   Date Value Ref Range Status   09/28/2024 8.6 8.6 - 10.5 mg/dL Final   09/27/2024 8.7 8.6 - 10.5 mg/dL Final   09/26/2024 8.9 8.6 - 10.5 mg/dL Final      Magnesium Magnesium   Date Value Ref Range Status   09/27/2024 1.8 1.6 - 2.6 mg/dL Final         .imag    aspirin, 81 mg, Oral, Daily  chlorhexidine, 15 mL, Mouth/Throat, Q12H  enoxaparin, 40 mg, Subcutaneous, Daily  fenofibrate, 145 mg, Oral, Daily  furosemide, 40 mg, Intravenous, Once  metoprolol tartrate, 25 mg, Oral, Once  metoprolol tartrate, 50 mg, Oral, Q12H  mupirocin, , Each Nare, BID  pantoprazole, 40 mg, Oral, Q AM  polyethylene glycol, 17 g, Oral, BID  rosuvastatin, 40 mg, Oral, Nightly  senna-docusate sodium, 2 tablet, Oral, " BID      insulin, 0-100 Units/hr, Last Rate: 2.2 Units/hr (09/28/24 0642)  sodium chloride, 30 mL/hr, Last Rate: 30 mL/hr (09/26/24 1151)            Coronary artery disease    Coronary artery disease of bypass graft of native heart with stable angina pectoris      Assessment & Plan    - MV CAD, hx off pump CABG x3 with LIMA to LAD, SVG to distal RCA, SVG to 1st diagonal ([facility], 6/2011), EF 50-55% (echo)--s/p reop CABG no op note available at this time ([facility])  - HTN--stable  - HLD--statin/ fenofibrate, cholestyramine  - DM type 2--followed by Dr. Mendenhall, on Jardiance/ metformin/ U-500 insulin  - Hypertriglyceridemia with hx pancreatitis--last trigly 1056 (3/2024)  - Postop ABLA, expected--watch closely  - Postop TCP, consumptive--watch closely     POD# 2  Doing well, ARMEN Dorsey on asa/statin/bb.  Increase Lopressor to 50 (he takes 75 at home).  Repeat e-.  Diurese.  Endo consulted for DM mmgt.  Mobilize.  ARMEN Dorsey.      Anticipate home at discharge    Toby Harden MD  09/28/24  09:09 EDT             Electronically signed by Toby Harden MD at 09/28/24 0911          Consult Notes (last 72 hours)        Rosario Mendenahll MD at 09/27/24 1257                Inpatient Endocrine Consult  Consultation requested by cardiothoracic surgery team for postop diabetes management  Patient Care Team:  Prabhu Downing MD as PCP - General  Prabhu Downing MD as PCP - Family Medicine  Ham Jacinto MD as Consulting Physician (Cardiology)    Chief Complaint: Postop diabetes management    HPI: This is a 59-year-old male with history of type 2 diabetes, hypertension, hyperlipidemia, hypertriglyceridemia, CAD underwent CABG on September 26, 2024.  He is currently on IV insulin and has been requested to be seen in management of diabetes.    At home for type 2 diabetes he is on metformin 1000 twice a day, U 500 insulin 130 units sq before breakfast and lunch and 120 units before dinner,, Jardiance 25 mg p.o. daily.  He is  now on Dexcom G7 monitoring system.     Currently on IV insulin requiring at 1.9 units/h.  He has started eating just now    Past Medical History:   Diagnosis Date    CAD (coronary artery disease)     Hyperlipidemia     Hypertension     Type 2 diabetes mellitus        Social History     Socioeconomic History    Marital status:      Spouse name: hari    Number of children: 0    Years of education: 19   Tobacco Use    Smoking status: Never    Smokeless tobacco: Never   Vaping Use    Vaping status: Never Used   Substance and Sexual Activity    Alcohol use: No    Drug use: Defer    Sexual activity: Defer       Family History   Problem Relation Age of Onset    Heart attack Mother     Alcohol abuse Father        Allergies   Allergen Reactions    Levofloxacin Unknown (See Comments)       ROS:   Constitutional:  Denies fatigue, tiredness.    Eyes:  Denies change in visual acuity   HENT:  Denies nasal congestion or sore throat   Respiratory: Denies cough, shortness of breath.   Cardiovascular:  Denies chest pain, edema   GI:  Denies abdominal pain, nausea, vomiting.   :  Denies polyuria and polydipsia  Musculoskeletal:  Denies back pain or joint pain   Integument:  Denies dry skin, rash   Neurologic:  Denies headache, focal weakness or sensory changes   Endocrine:  Denies polyuria or polydipsia   Psychiatric:  Denies depression or anxiety      Vitals:    09/27/24 1130   BP: 127/66   Pulse: 84   Resp:    Temp:    SpO2: 98%      Body mass index is 27.53 kg/m².     Physical Exam:  GEN: NAD, conversant  EYES: EOMI, PERRL  NECK: no thyromegaly  CV: RRR  LUNG: CTA  SKIN: no rashes, no acanthosis  MSK: no deformities,   NEURO: no tremors, DTR normal  PSYCH: Awake and coherent      Results Review:     I reviewed the patient's new clinical results.    Lab Results   Component Value Date    GLUCOSE 150 (H) 09/27/2024    BUN 13 09/27/2024    CREATININE 0.87 09/27/2024    EGFRIFNONA 76 12/30/2021    BCR 14.9 09/27/2024    K  4.3 09/27/2024    CO2 24.8 09/27/2024    CALCIUM 8.7 09/27/2024    PROTENTOTREF 6.8 06/17/2022    ALBUMIN 4.5 09/27/2024    LABIL2 CANCELED 06/17/2022    AST 25 09/24/2024    ALT 28 09/24/2024       Lab Results   Component Value Date    HGBA1C 8.70 (H) 03/04/2024    HGBA1C 8.9 (H) 02/10/2023    HGBA1C 9.6 (H) 06/17/2022     Lab Results   Component Value Date    MICROALBUR 2.1 03/04/2024    CREATININE 0.87 09/27/2024     Results from last 7 days   Lab Units 09/27/24  1157 09/27/24  1059 09/27/24  1047 09/27/24  1005 09/27/24  0852 09/27/24  0740   GLUCOSE mg/dL 135* 154* 165* 168* 196* 137*       Medication Review: Reviewed.       Current Facility-Administered Medications:     acetaminophen (TYLENOL) tablet 650 mg, 650 mg, Oral, Q4H PRN **OR** acetaminophen (TYLENOL) 160 MG/5ML oral solution 650 mg, 650 mg, Oral, Q4H PRN **OR** acetaminophen (TYLENOL) suppository 650 mg, 650 mg, Rectal, Q4H PRN, Satterly-Perri, Anamika L, APRN    aspirin EC tablet 81 mg, 81 mg, Oral, Daily, Ovidio-Perri Anamika L, APRN, 81 mg at 09/27/24 0822    Calcium Replacement - Follow Nurse / BPA Driven Protocol, , Does not apply, PRN, Satterly-Perri, Anamika L, APRN    ceFAZolin 2000 mg IVPB in 100 mL NS (MBP), 2,000 mg, Intravenous, Q8H, Ovidio-Perri, Anamika L, APRN, 2,000 mg at 09/27/24 1053    chlorhexidine (PERIDEX) 0.12 % solution 15 mL, 15 mL, Mouth/Throat, Q12H, Ovidio-Perri Anamika L, APRN, 15 mL at 09/27/24 0822    cyclobenzaprine (FLEXERIL) tablet 5 mg, 5 mg, Oral, TID PRN, Anamika Castle APRALEJANDRO    dextrose (D50W) (25 g/50 mL) IV injection 10-50 mL, 10-50 mL, Intravenous, Q15 Min PRN, Anamika Castle, APRN    dextrose (GLUTOSE) oral gel 15 g, 15 g, Oral, Q15 Min PRN, Anamika Castle APRN    [START ON 9/28/2024] Enoxaparin Sodium (LOVENOX) syringe 40 mg, 40 mg, Subcutaneous, Daily, Osmany Mares MD    glucagon (GLUCAGEN) injection 1 mg, 1 mg, Intramuscular, Q15 Min PRN,  Satterjaydon-Perri, Anamika L, APRN    HYDROcodone-acetaminophen (NORCO) 5-325 MG per tablet 1 tablet, 1 tablet, Oral, Q4H PRN, Ovidio-Perri Anamika L, APRN, 1 tablet at 09/27/24 1223    insulin regular 1 unit/mL in 0.9% sodium chloride (Glucommander), 0-100 Units/hr, Intravenous, Titrated, Corrine Anamika L, APRN, Last Rate: 1.9 mL/hr at 09/27/24 1200, 1.9 Units/hr at 09/27/24 1200    Magnesium Cardiology Dose Replacement - Follow Nurse / BPA Driven Protocol, , Does not apply, PRN, Satterly-Eprri, Anamika L, APRN    metoprolol tartrate (LOPRESSOR) tablet 25 mg, 25 mg, Oral, Q12H, Satterjaydon-Perri, Anamika L, APRN, 25 mg at 09/27/24 1053    morphine injection 2 mg, 2 mg, Intravenous, Q2H PRN, 2 mg at 09/27/24 0421 **AND** naloxone (NARCAN) injection 0.4 mg, 0.4 mg, Intravenous, Q5 Min PRN, Satterly-Perri, Anamika L, APRN    mupirocin (BACTROBAN) 2 % nasal ointment, , Each Nare, BID, Satterjaydon-Perri, Anamika L, APRN, 1 Application at 09/27/24 0822    nitroglycerin (NITROSTAT) SL tablet 0.4 mg, 0.4 mg, Sublingual, Q5 Min PRN, Satterjaydon-Perri, Anamika L, APRN    ondansetron (ZOFRAN) injection 4 mg, 4 mg, Intravenous, Q6H PRN, Satterjaydon-Perri, Anamika L, APRN, 4 mg at 09/26/24 1551    pantoprazole (PROTONIX) EC tablet 40 mg, 40 mg, Oral, Q AM, Satterly-Perri, Anamika L, APRN, 40 mg at 09/27/24 0548    Phosphorus Replacement - Follow Nurse / BPA Driven Protocol, , Does not apply, PRN, Satterly-Perri, Anamika L, APRN    polyethylene glycol (MIRALAX) packet 17 g, 17 g, Oral, BID, Anamika Castle APRN, 17 g at 09/27/24 0822    Potassium Replacement - Follow Nurse / BPA Driven Protocol, , Does not apply, PRN, Anamika Castle APRN    rosuvastatin (CRESTOR) tablet 40 mg, 40 mg, Oral, Nightly, Anamika Castle APRN    sennosides-docusate (PERICOLACE) 8.6-50 MG per tablet 2 tablet, 2 tablet, Oral, BID, Anamika Castle APRN, 2 tablet at 09/27/24 0822     sodium chloride 0.9 % infusion, 30 mL/hr, Intravenous, Continuous, Ovidio-Anamika Rayo, APRN, Last Rate: 30 mL/hr at 09/26/24 1151, 30 mL/hr at 09/26/24 1151          Assessment and plan:  Diabetes mellitus type 2 with hyperglycemia: Uncontrolled, currently on IV insulin per CABG protocol.  Will continue that.  Will start subcu insulin about 2 days.    CAD: Status post CABG with postop day 2 today.    Hyperlipidemia/hypertriglyceridemia: Currently on rosuvastatin, will resume fenofibrate.    Thank you very much for the consultation.      Rosario Mendenhall MD FACE.               Electronically signed by Rosario Mendenhall MD at 09/27/24 2281

## 2024-09-30 NOTE — PLAN OF CARE
Goal Outcome Evaluation:  Pt recalled 0/3 sternal precautions.   Therapeutic Exercise: 10 reps UE and LE AROM in supine    MET level equivalent: 2.0-3.0 (Unlimited sitting, ambulation on level ground <2mph, light housework)   Precautions - desaturates with activity and sternal precautions, CT    Bed mobility - CGA with verbal cues  Transfers - SBA and CGA  Ambulation - 225 feet CGA and with rolling walker    Vitals: Desaturates with O2 titrated to 3 L for gait ( pleth was faulty)

## 2024-09-30 NOTE — PROGRESS NOTES
S/P POD# 4 reop CABG--[facility]  EF 50-55% (echo)    Subjective:  no c/o's    Developed left ptx requiring CT placement 9/28  Glucoses 232-300 last 24 hrs  Wt is up 2 kgs from preop  CXR:  trace right apical ptx, no residual left ptx      Intake/Output Summary (Last 24 hours) at 9/30/2024 1006  Last data filed at 9/30/2024 0802  Gross per 24 hour   Intake 1400 ml   Output 2625 ml   Net -1225 ml     Temp:  [97.5 °F (36.4 °C)-98.6 °F (37 °C)] 97.5 °F (36.4 °C)  Heart Rate:  [67-95] 76  Resp:  [13-25] 13  BP: (100-135)/(59-75) 116/70  L PCT  (for ptx) 150/24 (nothing recorded 11p-7a)    Results from last 7 days   Lab Units 09/30/24 0315 09/29/24  0426 09/28/24  0350 09/27/24  0320 09/26/24  1229 09/26/24  1129 09/26/24  1037   WBC 10*3/mm3 4.58 6.48   < > 6.75   < > 5.82  --    HEMOGLOBIN g/dL 9.4* 9.7*   < > 8.5*   < > 9.6*  --    HEMOGLOBIN, POC   --   --   --   --    < >  --   --    HEMATOCRIT % 30.2* 31.2*   < > 26.9*   < > 29.1*  --    HEMATOCRIT POC   --   --   --   --    < >  --   --    PLATELETS 10*3/mm3 82* 74*   < > 71*   < > 106*  --    INR   --   --   --  1.41*  --  1.24* 1.27*    < > = values in this interval not displayed.     Results from last 7 days   Lab Units 09/30/24 0315 09/28/24 0350 09/27/24  0414 09/27/24  0320   CREATININE mg/dL 0.75*   < > 0.87  --    POTASSIUM mmol/L 4.2   < > 4.3  --    SODIUM mmol/L 137   < > 143  --    MAGNESIUM mg/dL  --   --   --  1.8   PHOSPHORUS mg/dL  --   --  4.1  --     < > = values in this interval not displayed.     Physical Exam:  Neuro intact, nad, up in chair  Tele:  SR 90s  Diminished bases, 94% 1L  Sternotomy/ LLE SVHS healing well  Benign abd, no BM, + flatus  No edema    Assessment/Plan:  Principal Problem:    Coronary artery disease  Active Problems:    Coronary artery disease of bypass graft of native heart with stable angina pectoris    - MV CAD, hx off pump CABG x3 with LIMA to LAD, SVG to distal RCA, SVG to 1st diagonal ([facility], 6/2011), EF 50-55%  (echo)--s/p reop CABG x3 with SVG to OM1, SVG to OM2, SVG to PDA ([facility])  - HTN--stable  - HLD--statin/ fenofibrate, cholestyramine  - DM type 2--followed by Dr. Mendenhall, on Jardiance/ metformin/ U-500 insulin  - Hypertriglyceridemia with hx pancreatitis--last trigly 1056 (3/2024)  - Postop ABLA, expected--watch closely  - Postop TCP, consumptive--watch closely  - Postop left ptx--[facility]    POD# 4.  Doing well, looks great.  DC wires today.  On asa/statin/bb.  Call out to Endo about glucoses.  Mobilize.  Re-evaluate chest tube later today.  No BM yet, add lactulose later today.    Routine care--as above  D/w pt/nsg, Dr. Mares, endo  Anticipate home at discharge in next 1-2 days    Anamika Castle, APRN  9/30/2024  10:06 EDT

## 2024-09-30 NOTE — PROGRESS NOTES
ENDOCRINE CONSULT PROGRESS NOTE  DATE OF SERVICE: 24        PATIENT NAME: Rodney Chaney  PATIENT : 1965 AGE: 59 y.o.  MRN NUMBER: [id number]    ==========================================================================    CHIEF COMPLAINT: Type 2 diabetes with severe insulin resistance    CARE TEAM:   Patient Care Team:  Prabhu Downing MD as PCP - General  Prabhu Downing MD as PCP - Family Medicine  Ham Jacinto MD as Consulting Physician (Cardiology)    SUBJECTIVE    Pt seen and examined.  Tolerating meals.    ==========================================================================    CURRENT ACTIVE HOSPITAL MEDICATIONS    Scheduled Medications:  aspirin, 81 mg, Oral, Daily  chlorhexidine, 15 mL, Mouth/Throat, Q12H  [START ON 10/1/2024] enoxaparin, 40 mg, Subcutaneous, Daily  fenofibrate, 145 mg, Oral, Daily  furosemide, 40 mg, Oral, Daily  insulin lispro, 4-24 Units, Subcutaneous, TID With Meals  Insulin Regular Human (Conc), 90 Units, Subcutaneous, TID AC  metoprolol tartrate, 75 mg, Oral, Q12H  mupirocin, , Each Nare, BID  pantoprazole, 40 mg, Oral, Q AM  polyethylene glycol, 17 g, Oral, BID  rosuvastatin, 40 mg, Oral, Nightly  senna-docusate sodium, 2 tablet, Oral, BID         PRN Medications:    acetaminophen **OR** acetaminophen **OR** acetaminophen    Calcium Replacement - Follow Nurse / BPA Driven Protocol    cyclobenzaprine    dextrose    dextrose    glucagon (human recombinant)    HYDROcodone-acetaminophen    lactulose    Magnesium Cardiology Dose Replacement - Follow Nurse / BPA Driven Protocol    [] Morphine **AND** naloxone    nitroglycerin    ondansetron    Phosphorus Replacement - Follow Nurse / BPA Driven Protocol    Potassium Replacement - Follow Nurse / BPA Driven Protocol     ==========================================================================    OBJECTIVE    Vitals:    24 1516   BP:    Pulse:    Resp: (!) 30   Temp: 97.6 °F (36.4 °C)   SpO2:       Body  mass index is 26.2 kg/m².     General - A&Ox3, NAD, Calm    ==========================================================================    LAB EVALUATION    Lab Results   Component Value Date    GLUCOSE 236 (H) 09/30/2024    BUN 22 (H) 09/30/2024    CREATININE 0.75 (L) 09/30/2024    EGFRIFNONA 76 12/30/2021    BCR 29.3 (H) 09/30/2024    K 4.2 09/30/2024    CO2 31.3 (H) 09/30/2024    CALCIUM 9.0 09/30/2024    PROTENTOTREF 6.8 06/17/2022    ALBUMIN 4.5 09/27/2024    LABIL2 CANCELED 06/17/2022    AST 25 09/24/2024    ALT 28 09/24/2024       Lab Results   Component Value Date    HGBA1C 8.70 (H) 03/04/2024    HGBA1C 8.9 (H) 02/10/2023    HGBA1C 9.6 (H) 06/17/2022     Lab Results   Component Value Date    MICROALBUR 2.1 03/04/2024    CREATININE 0.75 (L) 09/30/2024     Results from last 7 days   Lab Units 09/30/24  1640 09/30/24  1519 09/30/24  1142 09/30/24  0738 09/30/24  0010 09/29/24  2210   GLUCOSE mg/dL 288* 324* 247* 232* 256* 298*     ==========================================================================    ASSESSMENT AND PLAN    # Type 2 diabetes with hyperglycemia  - Patient blood sugar continues to be significantly elevated  - Currently on U-500 insulin  - Increase insulin U-500 therapy to 90 units 3 times a day  - Patient prior to hospitalization was maintained on 130 units with each meal  - Will continue to review blood sugar and adjust therapy accordingly  - Continue sliding scale    # Coronary artery disease status post CABG      Will follow with you.  Rest as per primary team.    Part of this note may be an electronic transcription/translation of spoken language to printed text using the Dragon Dictation System.     Note: Portions of this note may have been copied from previous notes but documentation have been reviewed and edited as necessary to support clinical decision making for today's visit.  The time of this note does not reflect the time I saw the patient but the time that this note was  written.    ==========================================================================  Britton Sesay MD  Department of Endocrine, Diabetes and Metabolism  [facility], [place]  ==========================================================================

## 2024-09-30 NOTE — CASE MANAGEMENT/SOCIAL WORK
Continued Stay Note  LILIYA Santoyo     Patient Name: Rodney Chaney  MRN: [id number]  Today's Date: 9/30/2024    Admit Date: 9/26/2024    Plan: DC Plan: Home with family assistance. CABG ReOp 9/26/2024.   Discharge Plan       Row Name 09/30/24 1553       Plan    Plan DC Plan: Home with family assistance. CABG ReOp 9/26/2024.    Patient/Family in Agreement with Plan yes    Provided Post Acute Provider List? N/A    Provided Post Acute Provider Quality & Resource List? N/A    Plan Comments CM spoke with patient’s nurse and [facility] NP Anamika Goldman to obtain clinical updates. Patient had to have Pleural Chest tube placed on Saturday 9/28/2024. Will speak with CT surgeon this afternoon to discuss removal timing.CM will continue to follow for any additional needs and adjust DC plan accordingly. DC Barriers: POD 4 OHS, Cardiac monitoring,Chest tube x1, and glucose control.               Expected Discharge Date and Time       Expected Discharge Date Expected Discharge Time    Oct 1, 2024           Phone communication or documentation only- no physical contact with patient or family.      June Wilcox RN.    Office Phone: [phone]  Office Cell:     [phone]

## 2024-09-30 NOTE — TELEPHONE ENCOUNTER
INR (no units)   Date Value   07/19/2021 2.9   07/12/2021 2.4     Current dose 7.5mg daily.    Notified spouse that her and the patient's FMLA have been faxed.

## 2024-10-01 ENCOUNTER — APPOINTMENT (OUTPATIENT)
Dept: GENERAL RADIOLOGY | Facility: HOSPITAL | Age: 59
End: 2024-10-01
Payer: COMMERCIAL

## 2024-10-01 LAB
ANION GAP SERPL CALCULATED.3IONS-SCNC: 9.3 MMOL/L (ref 5–15)
BUN SERPL-MCNC: 20 MG/DL (ref 6–20)
BUN/CREAT SERPL: 26 (ref 7–25)
CALCIUM SPEC-SCNC: 9.4 MG/DL (ref 8.6–10.5)
CHLORIDE SERPL-SCNC: 97 MMOL/L (ref 98–107)
CO2 SERPL-SCNC: 32.7 MMOL/L (ref 22–29)
CREAT SERPL-MCNC: 0.77 MG/DL (ref 0.76–1.27)
DEPRECATED RDW RBC AUTO: 47.6 FL (ref 37–54)
EGFRCR SERPLBLD CKD-EPI 2021: 103.1 ML/MIN/1.73
ERYTHROCYTE [DISTWIDTH] IN BLOOD BY AUTOMATED COUNT: 14.8 % (ref 12.3–15.4)
GLUCOSE BLDC GLUCOMTR-MCNC: 103 MG/DL (ref 70–105)
GLUCOSE BLDC GLUCOMTR-MCNC: 121 MG/DL (ref 70–105)
GLUCOSE BLDC GLUCOMTR-MCNC: 130 MG/DL (ref 70–105)
GLUCOSE BLDC GLUCOMTR-MCNC: 163 MG/DL (ref 70–105)
GLUCOSE BLDC GLUCOMTR-MCNC: 221 MG/DL (ref 70–105)
GLUCOSE BLDC GLUCOMTR-MCNC: 319 MG/DL (ref 70–105)
GLUCOSE BLDC GLUCOMTR-MCNC: 55 MG/DL (ref 70–105)
GLUCOSE BLDC GLUCOMTR-MCNC: 64 MG/DL (ref 70–105)
GLUCOSE BLDC GLUCOMTR-MCNC: 73 MG/DL (ref 70–105)
GLUCOSE SERPL-MCNC: 221 MG/DL (ref 65–99)
HCT VFR BLD AUTO: 33 % (ref 37.5–51)
HGB BLD-MCNC: 10.4 G/DL (ref 13–17.7)
MCH RBC QN AUTO: 27.7 PG (ref 26.6–33)
MCHC RBC AUTO-ENTMCNC: 31.5 G/DL (ref 31.5–35.7)
MCV RBC AUTO: 87.8 FL (ref 79–97)
PLATELET # BLD AUTO: 119 10*3/MM3 (ref 140–450)
PMV BLD AUTO: 10.6 FL (ref 6–12)
POTASSIUM SERPL-SCNC: 4.3 MMOL/L (ref 3.5–5.2)
RBC # BLD AUTO: 3.76 10*6/MM3 (ref 4.14–5.8)
SODIUM SERPL-SCNC: 139 MMOL/L (ref 136–145)
WBC NRBC COR # BLD AUTO: 4.96 10*3/MM3 (ref 3.4–10.8)

## 2024-10-01 PROCEDURE — 80048 BASIC METABOLIC PNL TOTAL CA: CPT | Performed by: NURSE PRACTITIONER

## 2024-10-01 PROCEDURE — 85027 COMPLETE CBC AUTOMATED: CPT | Performed by: NURSE PRACTITIONER

## 2024-10-01 PROCEDURE — 94799 UNLISTED PULMONARY SVC/PX: CPT

## 2024-10-01 PROCEDURE — 63710000001 INSULIN LISPRO (HUMAN) PER 5 UNITS: Performed by: INTERNAL MEDICINE

## 2024-10-01 PROCEDURE — 71045 X-RAY EXAM CHEST 1 VIEW: CPT

## 2024-10-01 PROCEDURE — 25010000002 MAGNESIUM SULFATE 2 GM/50ML SOLUTION

## 2024-10-01 PROCEDURE — 97112 NEUROMUSCULAR REEDUCATION: CPT

## 2024-10-01 PROCEDURE — 97116 GAIT TRAINING THERAPY: CPT

## 2024-10-01 PROCEDURE — 25010000002 ENOXAPARIN PER 10 MG: Performed by: THORACIC SURGERY (CARDIOTHORACIC VASCULAR SURGERY)

## 2024-10-01 PROCEDURE — 82948 REAGENT STRIP/BLOOD GLUCOSE: CPT | Performed by: INTERNAL MEDICINE

## 2024-10-01 PROCEDURE — 82948 REAGENT STRIP/BLOOD GLUCOSE: CPT

## 2024-10-01 PROCEDURE — 97110 THERAPEUTIC EXERCISES: CPT

## 2024-10-01 PROCEDURE — 99232 SBSQ HOSP IP/OBS MODERATE 35: CPT | Performed by: INTERNAL MEDICINE

## 2024-10-01 RX ORDER — MAGNESIUM SULFATE HEPTAHYDRATE 40 MG/ML
2 INJECTION, SOLUTION INTRAVENOUS ONCE
Status: COMPLETED | OUTPATIENT
Start: 2024-10-01 | End: 2024-10-01

## 2024-10-01 RX ORDER — METOPROLOL TARTRATE 50 MG
100 TABLET ORAL EVERY 12 HOURS SCHEDULED
Status: DISCONTINUED | OUTPATIENT
Start: 2024-10-01 | End: 2024-10-02 | Stop reason: HOSPADM

## 2024-10-01 RX ADMIN — ENOXAPARIN SODIUM 40 MG: 100 INJECTION SUBCUTANEOUS at 15:30

## 2024-10-01 RX ADMIN — HYDROCODONE BITARTRATE AND ACETAMINOPHEN 1 TABLET: 5; 325 TABLET ORAL at 21:48

## 2024-10-01 RX ADMIN — INSULIN LISPRO 16 UNITS: 100 INJECTION, SOLUTION INTRAVENOUS; SUBCUTANEOUS at 08:46

## 2024-10-01 RX ADMIN — METOPROLOL TARTRATE 75 MG: 50 TABLET, FILM COATED ORAL at 08:46

## 2024-10-01 RX ADMIN — PANTOPRAZOLE SODIUM 40 MG: 40 TABLET, DELAYED RELEASE ORAL at 05:19

## 2024-10-01 RX ADMIN — SENNOSIDES AND DOCUSATE SODIUM 2 TABLET: 50; 8.6 TABLET ORAL at 20:14

## 2024-10-01 RX ADMIN — CHLORHEXIDINE GLUCONATE, 0.12% ORAL RINSE 15 ML: 1.2 SOLUTION DENTAL at 20:14

## 2024-10-01 RX ADMIN — INSULIN LISPRO 8 UNITS: 100 INJECTION, SOLUTION INTRAVENOUS; SUBCUTANEOUS at 12:15

## 2024-10-01 RX ADMIN — HYDROCODONE BITARTRATE AND ACETAMINOPHEN 1 TABLET: 5; 325 TABLET ORAL at 14:01

## 2024-10-01 RX ADMIN — ROSUVASTATIN 40 MG: 10 TABLET, FILM COATED ORAL at 20:14

## 2024-10-01 RX ADMIN — FENOFIBRATE 145 MG: 145 TABLET ORAL at 08:46

## 2024-10-01 RX ADMIN — ASPIRIN 81 MG: 81 TABLET, COATED ORAL at 08:46

## 2024-10-01 RX ADMIN — POLYETHYLENE GLYCOL 3350 17 G: 17 POWDER, FOR SOLUTION ORAL at 20:15

## 2024-10-01 RX ADMIN — FUROSEMIDE 40 MG: 40 TABLET ORAL at 08:46

## 2024-10-01 RX ADMIN — HYDROCODONE BITARTRATE AND ACETAMINOPHEN 1 TABLET: 5; 325 TABLET ORAL at 00:09

## 2024-10-01 RX ADMIN — SENNOSIDES AND DOCUSATE SODIUM 2 TABLET: 50; 8.6 TABLET ORAL at 09:04

## 2024-10-01 RX ADMIN — MUPIROCIN 1 APPLICATION: 20 OINTMENT TOPICAL at 08:46

## 2024-10-01 RX ADMIN — METOPROLOL TARTRATE 100 MG: 50 TABLET, FILM COATED ORAL at 20:15

## 2024-10-01 RX ADMIN — HYDROCODONE BITARTRATE AND ACETAMINOPHEN 1 TABLET: 5; 325 TABLET ORAL at 05:19

## 2024-10-01 RX ADMIN — MAGNESIUM SULFATE HEPTAHYDRATE 2 G: 40 INJECTION, SOLUTION INTRAVENOUS at 15:30

## 2024-10-01 NOTE — CASE MANAGEMENT/SOCIAL WORK
Continued Stay Note  LILIYA Santoyo     Patient Name: Rodney Chaney  MRN: [id number]  Today's Date: 10/1/2024    Admit Date: 9/26/2024    Plan: DC Plan: Home with family assistance. CABG ReOp 9/26/2024.   Discharge Plan       Row Name 10/01/24 1600       Plan    Plan DC Plan: Home with family assistance. CABG ReOp 9/26/2024.    Patient/Family in Agreement with Plan yes    Provided Post Acute Provider List? N/A    Provided Post Acute Provider Quality & Resource List? N/A    Plan Comments CM spoke with patient’s nurse and [facility] NP Gabrielle Adams to obtain clinical updates. Potential plan to remove chest tube today.CM will continue to follow for any additional needs and adjust DC plan accordingly. DC Barriers: POD 5 OHS, Cardiac monitoring, O2@2L nc, Chest tube x1, and monitor labs.                 Expected Discharge Date and Time       Expected Discharge Date Expected Discharge Time    Oct 2, 2024           Phone communication or documentation only- no physical contact with patient or family.      June Wilcox RN     Office Phone: [phone]  Office Cell:     [phone]

## 2024-10-01 NOTE — PLAN OF CARE
Pt A/Ox4, assisted with ambulating down the hallway approx ~150 ft. Denies SOB, administered 2LNC preemptively. Pt went to bathroom and endorsed passing gas. Per report, had BM today. Paged Dr. Mares and clarified that chest tube stays in overnight, CXR in the morning, and MD to re-evaluate to pull tomorrow. Plan of care ongoing.       Problem: Adult Inpatient Plan of Care  Goal: Plan of Care Review  Outcome: Progressing  Goal: Patient-Specific Goal (Individualized)  Outcome: Progressing  Goal: Absence of Hospital-Acquired Illness or Injury  Outcome: Progressing  Intervention: Identify and Manage Fall Risk  Intervention: Prevent Skin Injury  Intervention: Prevent and Manage VTE (Venous Thromboembolism) Risk  Intervention: Prevent Infection  Goal: Optimal Comfort and Wellbeing  Outcome: Progressing  Intervention: Monitor Pain and Promote Comfort  Goal: Readiness for Transition of Care  Outcome: Progressing     Problem: Diabetes Comorbidity  Goal: Blood Glucose Level Within Targeted Range  Outcome: Progressing     Problem: Skin Injury Risk Increased  Goal: Skin Health and Integrity  Outcome: Progressing  Intervention: Optimize Skin Protection     Problem: Fall Injury Risk  Goal: Absence of Fall and Fall-Related Injury  Outcome: Progressing  Intervention: Identify and Manage Contributors  Intervention: Promote Injury-Free Environment   Goal Outcome Evaluation:

## 2024-10-01 NOTE — DISCHARGE SUMMARY
Date of Admission: 9/26/2024  Date of Discharge:  10/2/2024    Discharge Diagnosis:   - MV CAD, hx off pump CABG x3 with LIMA to LAD, SVG to distal RCA, SVG to 1st diagonal (Vishnu, 6/2011), EF 50-55% (echo)--s/p reop CABG x3 with SVG to OM1, SVG to OM2, SVG to PDA (Vishnu)  - HTN--stable  - HLD--statin/ fenofibrate, cholestyramine  - DM type 2--followed by Dr. Mendenhall, on Jardiance/ metformin/ U-500 insulin  - Hypertriglyceridemia with hx pancreatitis--last trigly 1056 (3/2024)  - Postop ABLA, expected--watch closely  - Postop TCP, consumptive--watch closely  - Postop left ptx--s/p CT placement ([facility], 9/[facility])    Presenting Problem/History of Present Illness  Coronary artery disease of bypass graft of native heart with stable angina pectoris [I25.708]     Hospital Course  Patient is a 59 y.o. male who presented to our facility for elective open heart surgery. On 9/26, patient underwent Redo sternotomy with extensive lysis of adhesions, CABG x 3 with reverse Individual saphenous vein graft to the obtuse marginal 1 and 2 and to the PDA, EVH of the left leg with Dr. Mares (see op-note for more detail). Operation went well and after, patient was transferred to [facility] in stable condition where he was later extubated. POD1, swan and arterial line removed, beta blocker added, patient diuresed. Endocrinology was consulted for DM management. POD2, toro discontinued, lopressor increased, patient diuresed. Patient developed left sided PTX and left pleural chest tube was placed. POD3, lopressor increased to home dose, patient placed on PO diuresis. POD4, AV wires removed, chest tube on waterseal. POD5, patient chest tube remains on waterseal and without air leak. Chest tube was clamped and CXR completed 2 hours after. CXR look good and without PTX, therefor this was removed. Beta blocker was increased due to tachycardia and patient tolerated this. Overnight oximetry ordered that night and patient had 14 minutes of  desaturation and will require nocturnal oxygen at discharge. POD6, walking oximetry ordered and patient passed. Patient deemed ready for discharge home with family assist. Patient educated on sternal precautions and signs of infection. Endocrinology recommend continuing home Jardiance and metformin along with 80units U500 TID. Patient to follow up in post-op clinic in 2-3 weeks with appointment listed below.        Procedures Performed  Procedure(s):  CORONARY ARTERY BYPASS GRAFTx 3 grafts  REOP WITH TRANSESOPHAGEAL ECHOCARDIOGRAM  10/02 1439 Walking Oximetry    Consults:   Consults       Date and Time Order Name Status Description    9/26/2024  4:15 PM Inpatient Endocrinology Consult      9/26/2024 11:20 AM Inpatient Cardiology Consult              Pertinent Test Results:    Lab Results   Component Value Date    WBC 4.96 10/02/2024    HGB 10.6 (L) 10/02/2024    HCT 33.3 (L) 10/02/2024    MCV 86.5 10/02/2024     10/02/2024      Lab Results   Component Value Date    GLUCOSE 77 10/02/2024    CALCIUM 9.1 10/02/2024     10/02/2024    K 3.8 10/02/2024    CO2 33.6 (H) 10/02/2024    CL 98 10/02/2024    BUN 18 10/02/2024    CREATININE 0.76 10/02/2024    EGFRIFNONA 76 12/30/2021    BCR 23.7 10/02/2024    ANIONGAP 6.4 10/02/2024     Lab Results   Component Value Date    INR 1.41 (H) 09/27/2024    PROTIME 15.0 (H) 09/27/2024         Condition on Discharge: Stable     Vital Signs  Temp:  [97.5 °F (36.4 °C)-98 °F (36.7 °C)] 98 °F (36.7 °C)  Heart Rate:  [] 82  Resp:  [14-23] 18  BP: ()/(39-77) 123/66      Discharge Disposition  Home or Self Care    Discharge Medications     Discharge Medications        New Medications        Instructions Start Date   acetaminophen 325 MG tablet  Commonly known as: TYLENOL   650 mg, Oral, Every 4 Hours PRN      furosemide 40 MG tablet  Commonly known as: LASIX   40 mg, Oral, Daily   Start Date: October 3, 2024     HYDROcodone-acetaminophen 5-325 MG per tablet  Commonly  known as: NORCO   1 tablet, Oral, Every 6 Hours PRN      potassium chloride 20 MEQ CR tablet  Commonly known as: KLOR-CON M20   20 mEq, Oral, Daily             Changes to Medications        Instructions Start Date   HumuLIN R U-500 KwikPen 500 UNIT/ML solution pen-injector CONCENTRATED injection  Generic drug: Insulin Regular Human (Conc)  What changed:   how much to take  when to take this  Another medication with the same name was removed. Continue taking this medication, and follow the directions you see here.   80 Units, Subcutaneous, 3 Times Daily Before Meals, With Meals      metoprolol tartrate 100 MG tablet  Commonly known as: LOPRESSOR  What changed:   medication strength  how much to take  when to take this   100 mg, Oral, Every 12 Hours Scheduled             Continue These Medications        Instructions Start Date   Apple Cider Vinegar 188 MG capsule   1 capsule, Oral, Daily      aspirin 81 MG tablet   Take 1 tablet by mouth Daily.      calcium carbonate (oyster shell) 500 MG tablet tablet   500 mg, Oral, Daily      cholestyramine light 4 GM/DOSE powder  Commonly known as: PREVALITE   4 g, Oral, 2 Times Daily With Meals      Dexcom G7 Sensor misc   USE 1 SENSOR EVERY 10 DAYS      fenofibrate 160 MG tablet   160 mg, Oral, Daily      Jardiance 25 MG tablet tablet  Generic drug: empagliflozin   Take 1 tablet by mouth once daily      metFORMIN 1000 MG tablet  Commonly known as: GLUCOPHAGE   1,000 mg, Oral, 2 Times Daily With Meals      nitroglycerin 0.4 MG SL tablet  Commonly known as: NITROSTAT   1 under the tongue as needed for angina, may repeat q5mins for up three doses      OMEGA-3 2100 PO   1 capsule, Oral, Daily      RED YEAST RICE EXTRACT PO   1 capsule, Oral, Daily      rosuvastatin 40 MG tablet  Commonly known as: CRESTOR   Take 1 tablet by mouth once daily             Stop These Medications      mupirocin 2 % ointment  Commonly known as: BACTROBAN              Discharge Diet: Heart healthy      Activity at Discharge:   1. No driving for 2 weeks and off narcotic pain medications.  2. Shower daily. Clean incisions with warm water and antibacterial soap only. Do not put any lotion or ointments on incisions.  3. Ambulate for 10 minutes at least 3 times a day.  4. No heavy lifting > 10lbs until seen in office.   5. Take all medications as prescribed.      Follow-up Appointments  Future Appointments   Date Time Provider Department Center   10/17/2024  1:30 PM Anamika Castle APRN [facility]   10/28/2024  1:50 PM Ham Jacinto MD [facility]   4/28/2025  9:15 AM Rosario Mendenhall MD [facility]     Additional Instructions for the Follow-ups that You Need to Schedule       Call MD With Problems / Concerns   As directed      Instructions:  Call office at [phone] for any drainage, increased redness, or fever over 100.5    Order Comments: Instructions:  Call office at [phone] for any drainage, increased redness, or fever over 100.5         Discharge Follow-up with PCP   As directed       Currently Documented PCP:    Prabhu Downing MD    PCP Phone Number:    [phone]     Follow Up Details: in 1 week        Discharge Follow-up with Specialty: Cardiac surgery; 2 Weeks   As directed      Specialty: Cardiac surgery   Follow Up: 2 Weeks   Follow Up Details: Appt. on 10/17        Discharge Follow-up with Specified Provider: Cardiologist; 1 Month   As directed      To: Cardiologist   Follow Up: 1 Month   Follow Up Details: call for appointment, bring all medication bottles to appointment        Discharge Follow-up with Specified Provider:    As directed      To:    Follow Up Details: 4-6 weeks, bring all current medications to appointment                Test Results Pending at Discharge       Gabrielle Adams PA-C  10/02/24  15:40 EDT

## 2024-10-01 NOTE — PLAN OF CARE
Goal Outcome Evaluation:    Assessment: Rodney Chaney presents with functional mobility impairments which indicate the need for skilled intervention. Pt is POD #5 for CABG. Pt has 1 CT which may be pulled today. Pt was unable to recall any of his sternal precautions this date and they were reviewed in depth with pt and spouse. PT stressed the importance of adhering to the sternal precautions and pt and spouse verbalized understanding. Pt reports he has a rolling walker that he can borrow for home but spouse is doubtful it will fit through their home. Tolerating session today without incident. Will continue to follow and progress as tolerated.     Plan/Recommendations:   If medically appropriate, No ongoing therapy recommended post-acute care. No therapy needs. Pt requires no DME at discharge.     Pt desires Home with family assist at discharge. Pt cooperative; agreeable to therapeutic recommendations and plan of care.

## 2024-10-01 NOTE — THERAPY TREATMENT NOTE
"Subjective: Pt agreeable to therapeutic plan of care. \"How can I remember those precautions?\"    Objective:   Phase 1 Cardiac Rehab Initiated - Acute Care    Cardiac Level II Activities  Sitting tolerance: >10min and supported  Standing tolerance: 1-5min and supported    Pt recalled 0/3 sternal precautions. Sternal precautions were reviewed again in depth with pt and spouse.     Precautions:  Mid-sternal incision; avoid scapular retraction and depression.  Cardiovascular impairment post-sx; encourage energy conservation strategies.    Therapeutic Exercise: 10 reps UE and LE AROM in supported sitting    MET level equivalent: 2.0-3.0 (Unlimited sitting, ambulation on level ground <2mph, light housework)   Precautions - desaturates with activity and sternal precautions    Bed mobility - N/A or Not attempted.  Transfers - SBA with verbal cues  Ambulation - 250 feet CGA and with rolling walker    Vitals: pleth was inconsistent; O2 was used at 2 L during gait    Pain: 4 VAS   Location: chest  Intervention for pain: Increased Activity    Education: Provided education on the importance of mobility in the acute care setting, Transfer Training, Energy conservation strategies, and Post-Op Precautions    Assessment: Rodney Chaney presents with functional mobility impairments which indicate the need for skilled intervention. Pt is POD #5 for CABG. Pt has 1 CT which may be pulled today. Pt was unable to recall any of his sternal precautions this date and they were reviewed in depth with pt and spouse. PT stressed the importance of adhering to the sternal precautions and pt and spouse verbalized understanding. Pt reports he has a rolling walker that he can borrow for home but spouse is doubtful it will fit through their home. Tolerating session today without incident. Will continue to follow and progress as tolerated.     Plan/Recommendations:   If medically appropriate, No ongoing therapy recommended post-acute care. No therapy " needs. Pt requires no DME at discharge.     Pt desires Home with family assist at discharge. Pt cooperative; agreeable to therapeutic recommendations and plan of care.         Basic Mobility 6-click:  Rollin = Total, A lot = 2, A little = 3; 4 = None  Supine>Sit:   1 = Total, A lot = 2, A little = 3; 4 = None   Sit>Stand with arms:  1 = Total, A lot = 2, A little = 3; 4 = None  Bed>Chair:   1 = Total, A lot = 2, A little = 3; 4 = None  Ambulate in room:  1 = Total, A lot = 2, A little = 3; 4 = None  3-5 Steps with railin = Total, A lot = 2, A little = 3; 4 = None  Score: 17    Modified Yoandy: N/A = No pre-op stroke/TIA    Post-Tx Position: Up in Chair, Alarms activated, and Call light and personal items within reach  PPE: gloves

## 2024-10-01 NOTE — PROGRESS NOTES
" LOS: 5 days   Patient Care Team:  Prabhu Downing MD as PCP - General  Prabhu Downing MD as PCP - Family Medicine  Ham Jacinto MD as Consulting Physician (Cardiology)    Chief Complaint: Post-op     Subjective     Subjective    Patient sitting in bedside chair, no new complaints     Objective     Vital Signs  Temp:  [97.5 °F (36.4 °C)-98 °F (36.7 °C)] 97.5 °F (36.4 °C)  Heart Rate:  [74-96] 74  Resp:  [16-30] 19  BP: (106-144)/(61-79) 128/71  Body mass index is 25.72 kg/m².    Intake/Output Summary (Last 24 hours) at 10/1/2024 0839  Last data filed at 10/1/2024 0812  Gross per 24 hour   Intake 1210 ml   Output 2820 ml   Net -1610 ml     I/O this shift:  In: 350 [P.O.:350]  Out: -     Chest tube drainage last 8 hours -- 40mL    Wt Readings from Last 3 Encounters:   10/01/24 86 kg (189 lb 11.2 oz)   09/23/24 85.3 kg (188 lb)   08/30/24 83.9 kg (185 lb)       Flowsheet Rows      Flowsheet Row First Filed Value   Admission Height 182.9 cm (72.01\") Documented at 09/26/2024 1130   Admission Weight 85.3 kg (188 lb 0.8 oz) Documented at 09/26/2024 1130            Objective:  General Appearance:  Comfortable and in no acute distress.    Vital signs: (most recent): Blood pressure 136/78, pulse 103, temperature 97.5 °F (36.4 °C), temperature source Axillary, resp. rate 19, height 182.9 cm (72.01\"), weight 86 kg (189 lb 11.2 oz), SpO2 97%.  Vital signs are normal.  No fever.    Output: Producing urine and producing stool.    Lungs:  Normal effort and normal respiratory rate.  There are decreased breath sounds.    Heart: Normal rate.  Regular rhythm.    Abdomen: Bowel sounds are normal.     Extremities: Normal range of motion.  There is no dependent edema.    Neurological: Patient is alert and oriented to person, place and time.    Skin:  Warm and dry.  (Sternal incision without erythema, edema, or drainage )              Results Review:        Results from last 7 days   Lab Units 10/01/24  0322 09/30/24  0315 09/29/24  0426 " "  WBC 10*3/mm3 4.96 4.58 6.48   HEMOGLOBIN g/dL 10.4* 9.4* 9.7*   HEMATOCRIT % 33.0* 30.2* 31.2*   PLATELETS 10*3/mm3 119* 82* 74*         PT/INR:  No results found for: \"PROTIME\"/No results found for: \"INR\"    Results from last 7 days   Lab Units 10/01/24  0322 09/30/24  0315 09/29/24  0426   SODIUM mmol/L 139 137 140   POTASSIUM mmol/L 4.3 4.2 4.0   CHLORIDE mmol/L 97* 98 101   CO2 mmol/L 32.7* 31.3* 32.8*   BUN mg/dL 20 22* 16   CREATININE mg/dL 0.77 0.75* 0.65*   GLUCOSE mg/dL 221* 236* 114*   CALCIUM mg/dL 9.4 9.0 8.7         Scheduled Meds:  aspirin, 81 mg, Oral, Daily  chlorhexidine, 15 mL, Mouth/Throat, Q12H  enoxaparin, 40 mg, Subcutaneous, Daily  fenofibrate, 145 mg, Oral, Daily  furosemide, 40 mg, Oral, Daily  insulin lispro, 4-24 Units, Subcutaneous, TID With Meals  Insulin Regular Human (Conc), 90 Units, Subcutaneous, TID AC  metoprolol tartrate, 75 mg, Oral, Q12H  mupirocin, , Each Nare, BID  pantoprazole, 40 mg, Oral, Q AM  polyethylene glycol, 17 g, Oral, BID  rosuvastatin, 40 mg, Oral, Nightly  senna-docusate sodium, 2 tablet, Oral, BID        Infusions:         Assessment & Plan       Coronary artery disease    Coronary artery disease of bypass graft of native heart with stable angina pectoris    S/P reop CABG x 3 with SVGs per Dr. Mares 9/26/2024      Assessment & Plan    - MV CAD, hx off pump CABG x3 with LIMA to LAD, SVG to distal RCA, SVG to 1st diagonal (Vishnu, 6/2011), EF 50-55% (echo)--s/p reop CABG x3 with SVG to OM1, SVG to OM2, SVG to PDA (Vishnu)  - HTN--stable  - HLD--statin/ fenofibrate, cholestyramine  - DM type 2--followed by Dr. Mendenhall, on Jardiance/ metformin/ U-500 insulin  - Hypertriglyceridemia with hx pancreatitis--last trigly 1056 (3/2024)  - Postop ABLA, expected--watch closely  - Postop TCP, consumptive--watch closely  - Postop left ptx--s/p CT placement ([facility], 9/28)    POD5:     Patient looks good this morning, sitting in bedside chair   Patient still with left " pleural chest tube (pigtail) on waterseal, CXR without PTX and no air leak -- will clamp CT and check CXR in 2 hours   Hyperglycemia -- Endocrinology managing insulin, increased yesterday  Patient has had BM, continue PO lasix  On asa/statin/bb, vitals remain stable, will order overnight oximetry for tonight incase chest tube is removed later today  Encourage IS, mobilize, continue routine care    Anticipate home with family assist soon pending progress     Gabrielle Adams PA-C  10/01/24  08:39 EDT

## 2024-10-01 NOTE — PROGRESS NOTES
CARDIOLOGY PROGRESS NOTE:    Rodney Chaney  59 y.o.  male  1965  [id number]      Referring Provider: Cardiac surgeon    Reason for follow-up: Status post coronary artery bypass surgery     Patient Care Team:  Prabhu Downing MD as PCP - General  Prabhu Downing MD as PCP - Family Medicine  Ham Jacinto MD as Consulting Physician (Cardiology)    Subjective .  No chest pain or shortness of breath    Objective sitting in chair comfortably     Review of Systems   Constitutional: Negative for fever and malaise/fatigue.   HENT:  Negative for ear pain and nosebleeds.    Eyes:  Negative for blurred vision and double vision.   Cardiovascular:  Negative for chest pain, dyspnea on exertion and palpitations.   Respiratory:  Negative for cough and shortness of breath.    Skin:  Negative for rash.   Musculoskeletal:  Negative for joint pain.   Gastrointestinal:  Negative for abdominal pain, nausea and vomiting.   Neurological:  Negative for focal weakness and headaches.   Psychiatric/Behavioral:  Negative for depression. The patient is not nervous/anxious.    All other systems reviewed and are negative.      Allergies: Levofloxacin    Scheduled Meds:aspirin, 81 mg, Oral, Daily  chlorhexidine, 15 mL, Mouth/Throat, Q12H  enoxaparin, 40 mg, Subcutaneous, Daily  fenofibrate, 145 mg, Oral, Daily  furosemide, 40 mg, Oral, Daily  insulin lispro, 4-24 Units, Subcutaneous, TID With Meals  Insulin Regular Human (Conc), 90 Units, Subcutaneous, TID AC  metoprolol tartrate, 75 mg, Oral, Q12H  pantoprazole, 40 mg, Oral, Q AM  polyethylene glycol, 17 g, Oral, BID  rosuvastatin, 40 mg, Oral, Nightly  senna-docusate sodium, 2 tablet, Oral, BID      Continuous Infusions:     PRN Meds:.  acetaminophen **OR** acetaminophen **OR** acetaminophen    Calcium Replacement - Follow Nurse / BPA Driven Protocol    cyclobenzaprine    dextrose    dextrose    glucagon (human recombinant)    HYDROcodone-acetaminophen    lactulose    Magnesium Cardiology  "Dose Replacement - Follow Nurse / BPA Driven Protocol    [] Morphine **AND** naloxone    nitroglycerin    ondansetron    Phosphorus Replacement - Follow Nurse / BPA Driven Protocol    Potassium Replacement - Follow Nurse / BPA Driven Protocol        VITAL SIGNS  Vitals:    10/01/24 0800 10/01/24 0812 10/01/24 0846 10/01/24 0900   BP: 128/71 128/71  136/78   BP Location:  Left arm     Patient Position:  Sitting     Pulse: 92  108 103   Resp:  19     Temp:  97.5 °F (36.4 °C)     TempSrc:  Axillary     SpO2: 98%   97%   Weight:       Height:           Flowsheet Rows      Flowsheet Row First Filed Value   Admission Height 182.9 cm (72.01\") Documented at 2024 1250   Admission Weight 85.3 kg (188 lb 0.8 oz) Documented at 2024 1250             TELEMETRY: Sinus rhythm    Physical Exam:  Constitutional:       Appearance: Well-developed.   Eyes:      General: No scleral icterus.     Conjunctiva/sclera: Conjunctivae normal.      Pupils: Pupils are equal, round, and reactive to light.   HENT:      Head: Normocephalic and atraumatic.   Neck:      Vascular: No carotid bruit or JVD.   Pulmonary:      Effort: Pulmonary effort is normal.      Breath sounds: Normal breath sounds. No wheezing. No rales.   Cardiovascular:      Normal rate. Regular rhythm.   Pulses:     Intact distal pulses.   Abdominal:      General: Bowel sounds are normal.      Palpations: Abdomen is soft.   Musculoskeletal: Normal range of motion.      Cervical back: Normal range of motion and neck supple. Skin:     General: Skin is warm and dry.      Findings: No rash.   Neurological:      Mental Status: Alert.      Comments: No focal deficits          Results Review:   I reviewed the patient's new clinical results.  Lab Results (last 24 hours)       Procedure Component Value Units Date/Time    POC Glucose 4x Daily Before Meals & at Bedtime [[id number]]  (Abnormal) Collected: 10/01/24 0816    Specimen: Blood Updated: 10/01/24 0818     Glucose 319 " mg/dL      Comment: Serial Number: [id number]:  [id number]       Basic Metabolic Panel [[id number]]  (Abnormal) Collected: 10/01/24 0322    Specimen: Blood Updated: 10/01/24 0403     Glucose 221 mg/dL      BUN 20 mg/dL      Creatinine 0.77 mg/dL      Sodium 139 mmol/L      Potassium 4.3 mmol/L      Chloride 97 mmol/L      CO2 32.7 mmol/L      Calcium 9.4 mg/dL      BUN/Creatinine Ratio 26.0     Anion Gap 9.3 mmol/L      eGFR 103.1 mL/min/1.73     Narrative:      GFR Normal >60  Chronic Kidney Disease <60  Kidney Failure <15      CBC (No Diff) [[id number]]  (Abnormal) Collected: 10/01/24 0322    Specimen: Blood Updated: 10/01/24 0356     WBC 4.96 10*3/mm3      RBC 3.76 10*6/mm3      Hemoglobin 10.4 g/dL      Hematocrit 33.0 %      MCV 87.8 fL      MCH 27.7 pg      MCHC 31.5 g/dL      RDW 14.8 %      RDW-SD 47.6 fl      MPV 10.6 fL      Platelets 119 10*3/mm3      Comment: Result checked         POC Glucose Once [[id number]]  (Abnormal) Collected: 09/30/24 1923    Specimen: Blood Updated: 09/30/24 1925     Glucose 273 mg/dL      Comment: Serial Number: [id number]:  [id number]       POC Glucose Once [[id number]]  (Abnormal) Collected: 09/30/24 1640    Specimen: Blood Updated: 09/30/24 1644     Glucose 288 mg/dL      Comment: Serial Number: [id number]:  [id number]       POC Glucose Once [[id number]]  (Abnormal) Collected: 09/30/24 1519    Specimen: Blood Updated: 09/30/24 1521     Glucose 324 mg/dL      Comment: Serial Number: [id number]:  [id number]               Imaging Results (Last 24 Hours)       Procedure Component Value Units Date/Time    XR Chest 1 View [[id number]] Collected: 10/01/24 0630     Updated: 10/01/24 0633    Narrative:      XR CHEST 1 VW    Date of Exam: 10/1/2024 5:40 AM EDT    Indication: FU pneumothorax    Comparison: 9/30/2024    Findings:  Appearance of the thorax is similar since 9/30/2024. Patient is status post median sternotomy and CABG. There is an indwelling left pleural  pigtail catheter in similar position. Scattered bibasilar atelectasis. No obvious pneumothorax. Cardiac silhouette   is unremarkable. Osseous structures are unchanged.      Impression:      Impression:  1.No significant change since 9/30/2024. Indwelling left pleural pigtail catheter. No obvious pneumothorax.      Electronically Signed: Chris Lo MD    10/1/2024 6:31 AM EDT    Workstation ID: [id number]    XR Chest 1 View [[id number]] Collected: 09/30/24 1654     Updated: 09/30/24 1658    Narrative:      XR CHEST 1 VW    Date of Exam: 9/30/2024 4:15 PM EDT    Indication: chest tube to water seal    Comparison: Chest radiograph 9/30/2024    Findings:  Lung volumes are low with nonspecific bibasilar opacities similar to prior possibly representing atelectasis. Elevated left hemidiaphragm stable from prior. Stable cardiomediastinal silhouette. Prior median sternotomy and CABG. Left thoracotomy tube in   stable position. No appreciable pneumothorax. Nonspecific blunting of costophrenic angles, difficult to exclude trace effusions and would be unchanged from prior. Degenerative related osseous changes. No new focal consolidation or worsening edema.      Impression:      Impression:  Stable left thoracotomy tube without appreciable pneumothorax.      Electronically Signed: Haim Munoz MD    9/30/2024 4:56 PM EDT    Workstation ID: [id number]            EKG      I personally viewed and interpreted the patient's EKG/Telemetry data:    ECHOCARDIOGRAM:  Results for orders placed in visit on 09/26/24    Intra-Op Anesthesia EDRE    Narrative  Intra-Op Anesthesia EDER    Procedure Performed: Intra-Op Anesthesia EDER  Start Time:  End Time:    Preanesthesia Checklist:  Patient identified, IV assessed, risks and benefits discussed, monitors and equipment assessed, procedure being performed at surgeon's request and anesthesia consent obtained.    General Procedure Information  EDER Placed for monitoring purposes only -- This is  not a diagnostic EDER  Diagnostic Indications for Echo:  hemodynamic monitoring  Location performed:  OR  Intubated  Bite block placed  Heart visualized  Probe Insertion:  Easy  Probe Type:  Biplane  Modalities:  Color flow mapping, continuous wave Doppler and 2D only    Echocardiographic and Doppler Measurements    Ventricles    Right Ventricle:  Cavity size normal.  Hypertrophy not present.  Global function normal.  Left Ventricle:  Cavity size normal.  Global Function normal.  Ejection Fraction 55%.        Valves    Aortic Valve:  Regurgitation absent.  Leaflet motions normal.    Mitral Valve:  Annulus normal.  Regurgitation trace.  Leaflets normal.  Leaflet motions normal.    Tricuspid Valve:  Regurgitation absent.  Leaflet motions normal.      Aorta    Ascending Aorta:  Size normal.  Mobile plaque not present.  Aortic Arch:  Size normal.  Mobile plaque not present.  Descending Aorta:  Size normal.  Mobile plaque not present.      Atria    Right Atrium:  Size normal.  Left Atrium:  Size normal.                Anesthesia Information  Performed Personally  Anesthesiologist:  Benson Fisher MD      Echocardiogram Comments:  Pre CPB:         Trace MR, EF 55%  Post CPB:    no change       STRESS MYOVIEW:  Results for orders placed during the hospital encounter of 08/20/24    Stress Test With Myocardial Perfusion - One Day    Interpretation Summary    Impressions are consistent with a high risk study.    Left ventricular ejection fraction is mildly reduced (Calculated EF = 43%).    Myocardial perfusion imaging indicates a large-sized, severe area of ischemia located in the inferior wall and lateral wall.       CARDIAC CATHETERIZATION:  Results for orders placed during the hospital encounter of 08/30/24    Cardiac Catheterization/Vascular Study       OTHER:         Assessment & Plan     Coronary artery disease  Patient underwent redo coronary bypass surgery with SVG to the PDA OM and diagonal branches but have also  has a LIMA from the previous surgery which is patent to the LAD  Patient is currently intubated and is off sedation  He will be extubated soon  Patient's chest tubes and Dorsey catheter draining well.  Blood pressure is currently slightly high and hence is on Cardene  Patient is extubated and started on oral medicines  Patient is also doing well ambulating.  Patient still has a chest tube but the chest x-ray showed no pneumothorax and no airleak and hence will clamp the CT and check the chest x-ray in 2 hours and then remove it for surgery.      Hypertension  Patient blood pressure currently stable and is on oral medicines    Hyperlipidemia  Patient patient is started on statins    Diabetes  Patient is on insulin and will be managed by the primary care doctor.    -    I discussed the patients findings and my recommendations with patient's nurse  Ham Jacinto MD  10/01/24  12:07 EDT

## 2024-10-01 NOTE — PROGRESS NOTES
ENDOCRINE CONSULT PROGRESS NOTE  DATE OF SERVICE: 10/01/24        PATIENT NAME: Rodney Chaney  PATIENT : 1965 AGE: 59 y.o.  MRN NUMBER: [id number]    ==========================================================================    CHIEF COMPLAINT: Type 2 diabetes with severe insulin resistance    CARE TEAM:   Patient Care Team:  Prabhu Downing MD as PCP - General  Prabhu Downing MD as PCP - Family Medicine  Ham Jacinto MD as Consulting Physician (Cardiology)    SUBJECTIVE    Pt seen and examined.  Tolerating meals.  Hypoglycemia today.    ==========================================================================    CURRENT ACTIVE HOSPITAL MEDICATIONS    Scheduled Medications:  aspirin, 81 mg, Oral, Daily  chlorhexidine, 15 mL, Mouth/Throat, Q12H  enoxaparin, 40 mg, Subcutaneous, Daily  fenofibrate, 145 mg, Oral, Daily  furosemide, 40 mg, Oral, Daily  insulin lispro, 4-24 Units, Subcutaneous, TID With Meals  Insulin Regular Human (Conc), 100 Units, Subcutaneous, TID AC  metoprolol tartrate, 100 mg, Oral, Q12H  pantoprazole, 40 mg, Oral, Q AM  polyethylene glycol, 17 g, Oral, BID  rosuvastatin, 40 mg, Oral, Nightly  senna-docusate sodium, 2 tablet, Oral, BID         PRN Medications:    acetaminophen **OR** acetaminophen **OR** acetaminophen    Calcium Replacement - Follow Nurse / BPA Driven Protocol    cyclobenzaprine    dextrose    dextrose    glucagon (human recombinant)    HYDROcodone-acetaminophen    lactulose    Magnesium Cardiology Dose Replacement - Follow Nurse / BPA Driven Protocol    [] Morphine **AND** naloxone    nitroglycerin    ondansetron    Phosphorus Replacement - Follow Nurse / BPA Driven Protocol    Potassium Replacement - Follow Nurse / BPA Driven Protocol     ==========================================================================    OBJECTIVE    Vitals:    10/01/24 1540   BP: 129/65   Pulse:    Resp: 21   Temp: 97.5 °F (36.4 °C)   SpO2:       Body mass index is 25.72 kg/m².      General - A&Ox3, NAD, Calm    ==========================================================================    LAB EVALUATION    Lab Results   Component Value Date    GLUCOSE 221 (H) 10/01/2024    BUN 20 10/01/2024    CREATININE 0.77 10/01/2024    EGFRIFNONA 76 12/30/2021    BCR 26.0 (H) 10/01/2024    K 4.3 10/01/2024    CO2 32.7 (H) 10/01/2024    CALCIUM 9.4 10/01/2024    PROTENTOTREF 6.8 06/17/2022    ALBUMIN 4.5 09/27/2024    LABIL2 CANCELED 06/17/2022    AST 25 09/24/2024    ALT 28 09/24/2024       Lab Results   Component Value Date    HGBA1C 8.70 (H) 03/04/2024    HGBA1C 8.9 (H) 02/10/2023    HGBA1C 9.6 (H) 06/17/2022     Lab Results   Component Value Date    MICROALBUR 2.1 03/04/2024    CREATININE 0.77 10/01/2024     Results from last 7 days   Lab Units 10/01/24  1725 10/01/24  1654 10/01/24  1635 10/01/24  1500 10/01/24  1211 10/01/24  0816   GLUCOSE mg/dL 130* 73 64* 121* 221* 319*     ==========================================================================    ASSESSMENT AND PLAN    # Type 2 diabetes with hyperglycemia  - Patient had hypoglycemia this evening  - Currently on U-500 insulin  - Will decrease insulin U-500 to 85 units 3 times a day  - Continue sliding scale  -Will review blood sugar for next 24 hours and adjust therapy accordingly    # Coronary artery disease status post CABG      Will follow with you.  Rest as per primary team.    Part of this note may be an electronic transcription/translation of spoken language to printed text using the Dragon Dictation System.     Note: Portions of this note may have been copied from previous notes but documentation have been reviewed and edited as necessary to support clinical decision making for today's visit.  The time of this note does not reflect the time I saw the patient but the time that this note was written.    ==========================================================================  Britton Sesay MD  Department of Endocrine, Diabetes and  Metabolism  [facility]  [place], [place]  ==========================================================================

## 2024-10-02 ENCOUNTER — APPOINTMENT (OUTPATIENT)
Dept: GENERAL RADIOLOGY | Facility: HOSPITAL | Age: 59
End: 2024-10-02
Payer: COMMERCIAL

## 2024-10-02 ENCOUNTER — READMISSION MANAGEMENT (OUTPATIENT)
Dept: CALL CENTER | Facility: HOSPITAL | Age: 59
End: 2024-10-02
Payer: COMMERCIAL

## 2024-10-02 VITALS
DIASTOLIC BLOOD PRESSURE: 66 MMHG | HEART RATE: 82 BPM | HEIGHT: 72 IN | TEMPERATURE: 98 F | RESPIRATION RATE: 18 BRPM | SYSTOLIC BLOOD PRESSURE: 123 MMHG | OXYGEN SATURATION: 96 % | BODY MASS INDEX: 25.33 KG/M2 | WEIGHT: 187 LBS

## 2024-10-02 LAB
ANION GAP SERPL CALCULATED.3IONS-SCNC: 6.4 MMOL/L (ref 5–15)
BUN SERPL-MCNC: 18 MG/DL (ref 6–20)
BUN/CREAT SERPL: 23.7 (ref 7–25)
CALCIUM SPEC-SCNC: 9.1 MG/DL (ref 8.6–10.5)
CHLORIDE SERPL-SCNC: 98 MMOL/L (ref 98–107)
CO2 SERPL-SCNC: 33.6 MMOL/L (ref 22–29)
CREAT SERPL-MCNC: 0.76 MG/DL (ref 0.76–1.27)
DEPRECATED RDW RBC AUTO: 47.6 FL (ref 37–54)
EGFRCR SERPLBLD CKD-EPI 2021: 103.5 ML/MIN/1.73
ERYTHROCYTE [DISTWIDTH] IN BLOOD BY AUTOMATED COUNT: 14.9 % (ref 12.3–15.4)
GLUCOSE BLDC GLUCOMTR-MCNC: 187 MG/DL (ref 70–105)
GLUCOSE BLDC GLUCOMTR-MCNC: 76 MG/DL (ref 70–105)
GLUCOSE SERPL-MCNC: 77 MG/DL (ref 65–99)
HCT VFR BLD AUTO: 33.3 % (ref 37.5–51)
HGB BLD-MCNC: 10.6 G/DL (ref 13–17.7)
MCH RBC QN AUTO: 27.5 PG (ref 26.6–33)
MCHC RBC AUTO-ENTMCNC: 31.8 G/DL (ref 31.5–35.7)
MCV RBC AUTO: 86.5 FL (ref 79–97)
PLATELET # BLD AUTO: 141 10*3/MM3 (ref 140–450)
PMV BLD AUTO: 10.5 FL (ref 6–12)
POTASSIUM SERPL-SCNC: 3.8 MMOL/L (ref 3.5–5.2)
POTASSIUM SERPL-SCNC: 3.8 MMOL/L (ref 3.5–5.2)
RBC # BLD AUTO: 3.85 10*6/MM3 (ref 4.14–5.8)
SODIUM SERPL-SCNC: 138 MMOL/L (ref 136–145)
WBC NRBC COR # BLD AUTO: 4.96 10*3/MM3 (ref 3.4–10.8)

## 2024-10-02 PROCEDURE — 85027 COMPLETE CBC AUTOMATED: CPT | Performed by: NURSE PRACTITIONER

## 2024-10-02 PROCEDURE — 80048 BASIC METABOLIC PNL TOTAL CA: CPT | Performed by: NURSE PRACTITIONER

## 2024-10-02 PROCEDURE — 84132 ASSAY OF SERUM POTASSIUM: CPT | Performed by: THORACIC SURGERY (CARDIOTHORACIC VASCULAR SURGERY)

## 2024-10-02 PROCEDURE — 82948 REAGENT STRIP/BLOOD GLUCOSE: CPT | Performed by: INTERNAL MEDICINE

## 2024-10-02 PROCEDURE — 94799 UNLISTED PULMONARY SVC/PX: CPT

## 2024-10-02 PROCEDURE — 94761 N-INVAS EAR/PLS OXIMETRY MLT: CPT

## 2024-10-02 PROCEDURE — 71045 X-RAY EXAM CHEST 1 VIEW: CPT

## 2024-10-02 PROCEDURE — 94618 PULMONARY STRESS TESTING: CPT

## 2024-10-02 PROCEDURE — 99232 SBSQ HOSP IP/OBS MODERATE 35: CPT | Performed by: INTERNAL MEDICINE

## 2024-10-02 PROCEDURE — 63710000001 INSULIN LISPRO (HUMAN) PER 5 UNITS: Performed by: INTERNAL MEDICINE

## 2024-10-02 RX ORDER — POTASSIUM CHLORIDE 1500 MG/1
20 TABLET, EXTENDED RELEASE ORAL DAILY
Qty: 30 TABLET | Refills: 0 | Status: SHIPPED | OUTPATIENT
Start: 2024-10-02 | End: 2024-11-01

## 2024-10-02 RX ORDER — ACETAMINOPHEN 325 MG/1
650 TABLET ORAL EVERY 4 HOURS PRN
Start: 2024-10-02

## 2024-10-02 RX ORDER — INSULIN HUMAN 500 [IU]/ML
80 INJECTION, SOLUTION SUBCUTANEOUS
Start: 2024-10-02

## 2024-10-02 RX ORDER — HYDROCODONE BITARTRATE AND ACETAMINOPHEN 5; 325 MG/1; MG/1
1 TABLET ORAL EVERY 6 HOURS PRN
Qty: 25 TABLET | Refills: 0 | Status: SHIPPED | OUTPATIENT
Start: 2024-10-02

## 2024-10-02 RX ORDER — FUROSEMIDE 40 MG
40 TABLET ORAL DAILY
Qty: 30 TABLET | Refills: 0 | Status: SHIPPED | OUTPATIENT
Start: 2024-10-03 | End: 2024-11-02

## 2024-10-02 RX ORDER — POTASSIUM CHLORIDE 750 MG/1
20 CAPSULE, EXTENDED RELEASE ORAL DAILY
Qty: 60 CAPSULE | Refills: 0 | Status: CANCELLED | OUTPATIENT
Start: 2024-10-02 | End: 2024-11-01

## 2024-10-02 RX ORDER — POTASSIUM CHLORIDE 1500 MG/1
20 TABLET, EXTENDED RELEASE ORAL ONCE
Status: COMPLETED | OUTPATIENT
Start: 2024-10-02 | End: 2024-10-02

## 2024-10-02 RX ORDER — METOPROLOL TARTRATE 100 MG
100 TABLET ORAL EVERY 12 HOURS SCHEDULED
Qty: 60 TABLET | Refills: 2 | Status: SHIPPED | OUTPATIENT
Start: 2024-10-02 | End: 2024-12-31

## 2024-10-02 RX ADMIN — ASPIRIN 81 MG: 81 TABLET, COATED ORAL at 09:24

## 2024-10-02 RX ADMIN — INSULIN LISPRO 4 UNITS: 100 INJECTION, SOLUTION INTRAVENOUS; SUBCUTANEOUS at 12:44

## 2024-10-02 RX ADMIN — POTASSIUM CHLORIDE 20 MEQ: 1500 TABLET, EXTENDED RELEASE ORAL at 05:34

## 2024-10-02 RX ADMIN — POTASSIUM CHLORIDE 20 MEQ: 1500 TABLET, EXTENDED RELEASE ORAL at 15:26

## 2024-10-02 RX ADMIN — METOPROLOL TARTRATE 100 MG: 50 TABLET, FILM COATED ORAL at 09:24

## 2024-10-02 RX ADMIN — PANTOPRAZOLE SODIUM 40 MG: 40 TABLET, DELAYED RELEASE ORAL at 05:34

## 2024-10-02 RX ADMIN — SENNOSIDES AND DOCUSATE SODIUM 2 TABLET: 50; 8.6 TABLET ORAL at 09:24

## 2024-10-02 RX ADMIN — FENOFIBRATE 145 MG: 145 TABLET ORAL at 09:24

## 2024-10-02 RX ADMIN — CHLORHEXIDINE GLUCONATE, 0.12% ORAL RINSE 15 ML: 1.2 SOLUTION DENTAL at 09:24

## 2024-10-02 RX ADMIN — FUROSEMIDE 40 MG: 40 TABLET ORAL at 09:24

## 2024-10-02 NOTE — CASE MANAGEMENT/SOCIAL WORK
Continued Stay Note  LILIYA Santoyo     Patient Name: Rodney Chaney  MRN: [id number]  Today's Date: 10/2/2024    Admit Date: 9/26/2024    Plan: DC Plan: Home with family assistance. CABG ReOp 9/26/2024.   Discharge Plan       Row Name 10/02/24 1604       Plan    Plan DC Plan: Home with family assistance. CABG ReOp 9/26/2024.    Patient/Family in Agreement with Plan yes    Provided Post Acute Provider List? N/A    Provided Post Acute Provider Quality & Resource List? N/A    Plan Comments CM spoke with patient’s nurse and [facility] NP Gabrielle Adams to obtain clinical updates. Chest tube removed 10/01/2024. Anticipating DC home today. Patient to transport home via private vehicle with spouse.                 Expected Discharge Date and Time       Expected Discharge Date Expected Discharge Time    Oct 2, 2024           Phone communication or documentation only- no physical contact with patient or family.      June Wilcox RN     Office Phone: [phone]  Office Cell:     [phone]

## 2024-10-02 NOTE — CASE MANAGEMENT/SOCIAL WORK
Case Management Discharge Note      Final Note: CM noted Nocturnal Oxygen orders in Epic. CM spoke with NP who confirmed an overnight oximetry was completed last night and patient failed with 19 minutes of desaturation. Patient passed walking oximetry today. Patient already off of unit at time of discovery. CM placed referral in basket for [facility] and liaison notified. Liaison confirms they can accept and will call the patient to set up home delivery. No barriers to DC.         Durable Medical Equipment       Service Provider Selected Services Address Phone Fax Patient Preferred    [facility] [address] [address] [phone] --                 Transportation Services  Private: Car (with spouse)    Final Discharge Disposition Code: 01 - home or self-care    Phone communication or documentation only- no physical contact with patient or family.  June Wilcox, RN    Office Phone: [phone]  Office Cell:     [phone]

## 2024-10-02 NOTE — PLAN OF CARE
Goal Outcome Evaluation:  Plan of Care Reviewed With: patient        Progress: improving  Outcome Evaluation: Pt vss, walking oximetry complete. Pt agreeable to discharge

## 2024-10-02 NOTE — PROCEDURES
Exercise Oximetry    Patient Name:Rodney Chaney   MRN: [id number]   Date: 10/02/24             ROOM AIR BASELINE   SpO2% 95   Heart Rate 99   Blood Pressure      EXERCISE ON ROOM AIR SpO2% EXERCISE ON O2 @  LPM SpO2%   1 MINUTE 95 1 MINUTE    2 MINUTES 96 2 MINUTES    3 MINUTES 93 3 MINUTES    4 MINUTES 95 4 MINUTES    5 MINUTES 98 5 MINUTES    6 MINUTES 95 6 MINUTES               Distance Walked  6 min Distance Walked   Dyspnea ([place] Scale)   Dyspnea ([place] Scale)   Fatigue ([place] Scale)   Fatigue ([place] Scale)   SpO2% Post Exercise  95 SpO2% Post Exercise   HR Post Exercise  100 HR Post Exercise   Time to Recovery   Time to Recovery     Comments: on ra, pt sats remain between 93-98. No soa complaints at this time.

## 2024-10-02 NOTE — PROGRESS NOTES
" LOS: 6 days   Patient Care Team:  Prabhu Downing MD as PCP - General  Prabhu Downing MD as PCP - Family Medicine  Ham Jacinto MD as Consulting Physician (Cardiology)    Chief Complaint: Post-op     Subjective     Subjective    Patient resting in bed, no new complaints this morning     Objective     Vital Signs  Temp:  [97.5 °F (36.4 °C)-97.9 °F (36.6 °C)] 97.5 °F (36.4 °C)  Heart Rate:  [] 84  Resp:  [14-23] 18  BP: ()/(39-96) 115/68  Body mass index is 25.36 kg/m².    Intake/Output Summary (Last 24 hours) at 10/2/2024 0848  Last data filed at 10/2/2024 0545  Gross per 24 hour   Intake 1921 ml   Output 1525 ml   Net 396 ml     No intake/output data recorded.      Wt Readings from Last 3 Encounters:   10/02/24 84.8 kg (187 lb)   09/23/24 85.3 kg (188 lb)   08/30/24 83.9 kg (185 lb)       Flowsheet Rows      Flowsheet Row First Filed Value   Admission Height 182.9 cm (72.01\") Documented at 09/26/2024 1130   Admission Weight 85.3 kg (188 lb 0.8 oz) Documented at 09/26/2024 1130            Objective:  General Appearance:  Comfortable, in no acute distress, well-appearing and not in pain.    Vital signs: (most recent): Blood pressure 115/68, pulse 84, temperature 97.5 °F (36.4 °C), temperature source Oral, resp. rate 18, height 182.9 cm (72.01\"), weight 84.8 kg (187 lb), SpO2 93%.  Vital signs are normal.  No fever.    Output: Producing urine and producing stool.    Lungs:  Normal effort and normal respiratory rate.  There are decreased breath sounds.    Heart: Normal rate.  Regular rhythm.    Abdomen: Bowel sounds are normal.     Extremities: Normal range of motion.  There is no dependent edema.    Neurological: Patient is alert and oriented to person, place and time.    Skin:  Warm and dry.  (Sternal incision without erythema, edema, or drainage  Left EVH incision with some erythema, no induration or drainage )              Results Review:        Results from last 7 days   Lab Units 10/02/24  6433 " "10/01/24  0322 09/30/24  0315   WBC 10*3/mm3 4.96 4.96 4.58   HEMOGLOBIN g/dL 10.6* 10.4* 9.4*   HEMATOCRIT % 33.3* 33.0* 30.2*   PLATELETS 10*3/mm3 141 119* 82*         PT/INR:  No results found for: \"PROTIME\"/No results found for: \"INR\"    Results from last 7 days   Lab Units 10/02/24  0305 10/01/24  0322 09/30/24  0315   SODIUM mmol/L 138 139 137   POTASSIUM mmol/L 3.8 4.3 4.2   CHLORIDE mmol/L 98 97* 98   CO2 mmol/L 33.6* 32.7* 31.3*   BUN mg/dL 18 20 22*   CREATININE mg/dL 0.76 0.77 0.75*   GLUCOSE mg/dL 77 221* 236*   CALCIUM mg/dL 9.1 9.4 9.0         Scheduled Meds:  aspirin, 81 mg, Oral, Daily  chlorhexidine, 15 mL, Mouth/Throat, Q12H  enoxaparin, 40 mg, Subcutaneous, Daily  fenofibrate, 145 mg, Oral, Daily  furosemide, 40 mg, Oral, Daily  insulin lispro, 4-24 Units, Subcutaneous, TID With Meals  Insulin Regular Human (Conc), 85 Units, Subcutaneous, TID AC  metoprolol tartrate, 100 mg, Oral, Q12H  pantoprazole, 40 mg, Oral, Q AM  polyethylene glycol, 17 g, Oral, BID  rosuvastatin, 40 mg, Oral, Nightly  senna-docusate sodium, 2 tablet, Oral, BID        Infusions:         Assessment & Plan       Coronary artery disease    Coronary artery disease of bypass graft of native heart with stable angina pectoris    S/P reop CABG x 3 with SVGs per Dr. Mares 9/26/2024      Assessment & Plan    - MV CAD, hx off pump CABG x3 with LIMA to LAD, SVG to distal RCA, SVG to 1st diagonal (Vishnu, 6/2011), EF 50-55% (echo)--s/p reop CABG x3 with SVG to OM1, SVG to OM2, SVG to PDA (Vishnu)  - HTN--stable  - HLD--statin/ fenofibrate, cholestyramine  - DM type 2--followed by Dr. Mendenhall, on Jardiance/ metformin/ U-500 insulin  - Hypertriglyceridemia with hx pancreatitis--last trigly 1056 (3/2024)  - Postop ABLA, expected--watch closely  - Postop TCP, consumptive--watch closely  - Postop left ptx--s/p CT placement (Dereck, 9/28)    POD6:     Patient looks good this morning, sitting in bed on the phone with a friend   Left pleural " chest tube removed yesterday without complication, CXR yesterday without PTX -- will check follow up CXR this morning   Glucoses yesterday were low -- Endocrine following, decreased insulin   Patient failed overnight oximetry last night with 14 minutes of desaturation -- Patient will require nocturnal oxygen at discharge   Metoprolol increased yesterday due to tachycardia, rate is improved this morning, tolerating well   Continue PO lasix, on asa/statin/bb, vitals remain stable  Encourage IS, mobilize, continue routine care    Will check with Endocrinology when it is ok for discharge from their standpoint along with their recommendation for DM regimen     Gabrielle Adams PA-C  10/02/24  08:48 EDT

## 2024-10-02 NOTE — DISCHARGE PLACEMENT REQUEST
"Rodney Muhammad (59 y.o. Male)       Date of Birth   1965    Social Security Number       Address   [address] [place] [address]    Home Phone   [phone]    MRN   [id number]       Roman Catholic   Jew    Marital Status                               Admission Date   9/26/24    Admission Type   Elective    Admitting Provider   Osmany Mares MD    Attending Provider   Osmany Mares MD    Department, Room/Bed   [facility] CARDIOVASCULAR CARE UNIT, 3115/1       Discharge Date       Discharge Disposition   Home or Self Care    Discharge Destination                                 Attending Provider: Osmany Mares MD    Allergies: Levofloxacin    Isolation: None   Infection: None   Code Status: CPR    Ht: 182.9 cm (72.01\")   Wt: 84.8 kg (187 lb)    Admission Cmt: None   Principal Problem: Coronary artery disease [I25.10]                   Active Insurance as of 9/26/2024       Primary Coverage       Payor Plan Insurance Group Employer/Plan Group    [employer] [facility] [id number]       Payor Plan Address Payor Plan Phone Number Payor Plan Fax Number Effective Dates    [address] [phone]  1/1/2024 - None Entered    [place] [address]         Subscriber Name Subscriber Birth Date Member ID       RODNEY MUHAMMAD 1965 [id number]                     Emergency Contacts        (Rel.) Home Phone Work Phone Mobile Phone    LEOLA MUHAMMAD (Spouse) -- -- [phone]                 History & Physical        Satterly-Anamika Rayo, JYOTHI at 09/24/24 1537       Attestation signed by Osmany Mares MD at 09/24/24 1622    I have reviewed this documentation and agree.                  Patient Care Team:  Prabhu Downing MD as PCP - General  Prabhu Downing MD as PCP - Family Medicine  Ham Jacinto MD as Consulting Physician (Cardiology)  Referring Provider:  Dr. Jacinto  Reason for consultation:  Progressive CAD, s/p CABG     Chief complaint: "  chest pain        Subjective  History of Present Illness:  57 y/o gentleman presented today for an elective heart cath after an abnormal stress test showing a large-sized, severe area of ischemia located in the inferior wall and lateral wall with an EF 43%.  He reports he was doing well until he started developing chest pain/tightness and fatigue while mowing the grass in the summer heat.  He has a hx of CABG, working on getting op note.  He also has HTN, HLD, and DM type 2.  He has never smoked.  Cardiac cath completed today and pt referred for surgical evaluation for reop CABG.        Review of Systems   Constitutional:  Positive for fatigue.   Respiratory:  Positive for chest tightness. Negative for shortness of breath.    Cardiovascular:  Positive for chest pain. Negative for palpitations and leg swelling.   Neurological:  Negative for dizziness and light-headedness.         Medical History        Past Medical History:   Diagnosis Date    CAD (coronary artery disease)      Hyperlipidemia      Hypertension      Type 2 diabetes mellitus           Surgical History         Past Surgical History:   Procedure Laterality Date    APPENDECTOMY        CHOLECYSTECTOMY        CORONARY ARTERY BYPASS GRAFT         2010               Family History   Problem Relation Age of Onset    Heart attack Mother      Alcohol abuse Father        Social History   Social History           Tobacco Use    Smoking status: Never    Smokeless tobacco: Never   Vaping Use    Vaping status: Never Used   Substance Use Topics    Alcohol use: No    Drug use: Defer         Prescriptions Prior to Admission           Medications Prior to Admission   Medication Sig Dispense Refill Last Dose    Apple Cider Vinegar 188 MG capsule Take 1 capsule by mouth Daily.     8/29/2024    aspirin 81 MG tablet Take 1 tablet by mouth Daily.     8/29/2024 at 0800    calcium carbonate, oyster shell, 500 MG tablet tablet Take 1 tablet by mouth Daily.     8/29/2024     "cholestyramine light (PREVALITE) 4 GM/DOSE powder Take 1 packet by mouth 2 (Two) Times a Day With Meals. 180 g 6 8/29/2024 at 1700    empagliflozin (Jardiance) 25 MG tablet tablet Take 1 tablet by mouth once daily 90 tablet 3 8/29/2024 at 0800    fenofibrate 160 MG tablet Take 1 tablet by mouth Daily.     8/29/2024 at 0800    Insulin Regular Human, Conc, (HumuLIN R U-500 KwikPen) 500 UNIT/ML solution pen-injector CONCENTRATED injection Inject 130 Units under the skin into the appropriate area as directed 2 (Two) Times a Day. With Meals     8/29/2024 at 1200    Insulin Regular Human, Conc, (HumuLIN R U-500 KwikPen) 500 UNIT/ML solution pen-injector CONCENTRATED injection Inject 120 Units under the skin into the appropriate area as directed Daily With Dinner.     8/29/2024    metFORMIN (GLUCOPHAGE) 1000 MG tablet TAKE ONE TABLET BY MOUTH TWICE A DAY WITH MEALS 180 tablet 4      metFORMIN (GLUCOPHAGE) 1000 MG tablet Take 1 tablet by mouth 2 (Two) Times a Day With Meals.     8/29/2024 at 1700    metoprolol tartrate (LOPRESSOR) 50 MG tablet Take 1.5 tablets by mouth 2 (Two) Times a Day.     8/30/2024    Omega-3 Fatty Acids (OMEGA-3 2100 PO) Take 1 capsule by mouth Daily.     Past Week    RED YEAST RICE EXTRACT PO Take 1 capsule by mouth Daily.          rosuvastatin (CRESTOR) 40 MG tablet Take 1 tablet by mouth once daily 90 tablet 3           Scheduled Medication         Allergies:  Levofloxacin           Objective  Vital Signs  Temp:  [98.1 °F (36.7 °C)] 98.1 °F (36.7 °C)  Heart Rate:  [53-71] 71  Resp:  [18] 18  BP: (114-131)/(73-82) 131/82     Flowsheet Rows       Flowsheet Row First Filed Value   Admission Height 182.9 cm (72\") Documented at 08/30/2024 1055   Admission Weight 84.2 kg (185 lb 10 oz) Documented at 08/30/2024 1055             182.9 cm (72\")     Physical Exam  Vitals and nursing note reviewed.   Constitutional:       General: He is awake.      Appearance: Normal appearance. He is well-developed and " well-groomed.      Comments: Seen in [facility].  No family present.  Nurse present.   HENT:      Head: Normocephalic and atraumatic.      Nose: Nose normal.      Mouth/Throat:      Lips: Pink.      Mouth: Mucous membranes are moist.      Pharynx: Uvula midline.   Eyes:      General: Lids are normal. No scleral icterus.     Extraocular Movements: Extraocular movements intact.      Conjunctiva/sclera: Conjunctivae normal.      Pupils: Pupils are equal, round, and reactive to light.   Neck:      Thyroid: No thyroid mass or thyromegaly.      Vascular: Normal carotid pulses. No carotid bruit, hepatojugular reflux or JVD.      Trachea: Trachea normal.   Cardiovascular:      Rate and Rhythm: Normal rate and regular rhythm.      Pulses:           Carotid pulses are 2+ on the right side and 2+ on the left side.       Radial pulses are 2+ on the right side and 2+ on the left side.        Femoral pulses are 2+ on the right side and 2+ on the left side.       Popliteal pulses are 2+ on the right side and 2+ on the left side.        Dorsalis pedis pulses are 2+ on the right side and 2+ on the left side.        Posterior tibial pulses are 2+ on the right side and 2+ on the left side.      Heart sounds: Normal heart sounds. No murmur heard.     Comments: Right femoral cath sheath  Tele:  SR 60s  Pulmonary:      Effort: Pulmonary effort is normal.      Breath sounds: Normal breath sounds.   Abdominal:      General: Abdomen is protuberant. Bowel sounds are normal. There is no distension.      Palpations: Abdomen is soft.      Tenderness: There is no abdominal tenderness.   Musculoskeletal:      Cervical back: Neck supple.      Right lower leg: No edema.      Left lower leg: No edema.      Comments: Gait steady and strong without use of assistive devices   Lymphadenopathy:      Cervical: No cervical adenopathy.      Upper Body:      Right upper body: No supraclavicular adenopathy.      Left upper body: No supraclavicular adenopathy.    Skin:     General: Skin is warm and dry.      Capillary Refill: Capillary refill takes less than 2 seconds.      Findings: No erythema or rash.      Nails: There is no clubbing.      Comments: Sternotomy/ SVHS well healed.  Skin has a eli appearance.   Neurological:      Mental Status: He is alert and oriented to person, place, and time.      GCS: GCS eye subscore is 4. GCS verbal subscore is 5. GCS motor subscore is 6.   Psychiatric:         Attention and Perception: Attention and perception normal.         Mood and Affect: Mood and affect normal.         Speech: Speech normal.         Behavior: Behavior normal. Behavior is cooperative.         Thought Content: Thought content normal.         Cognition and Memory: Cognition and memory normal.         Judgment: Judgment normal.            Results Review:   Lab Results (last 24 hours)         ** No results found for the last 24 hours. **                Cardiac cath per Dr. Jacinto 8/30/2024  Report pending              Assessment & Plan    Angina at rest    Abnormal nuclear stress test        Assessment & Plan     - MV CAD, hx off pump CABG x3 with LIMA to LAD, SVG to distal RCA, SVG to 1st diagonal (Vishnu, 6/2011), EF 43% (stress test)--evaluation for reop CABG  - HTN--stable  - HLD--statin/ fenofibrate, cholestyramine  - DM type 2--followed by Dr. Mendenhall, on Jardiance/ metformin/ U-500 insulin  - Hypertriglyceridemia with hx pancreatitis--last trigly 1056 (3/2024)     Dr. Mares evaluated cath films and patient.  Patient is followed by Dr. Mendenhall for his DM type 2 and lipids.  He is on U-500 insulin 120 units 3 times a day currently.  We are going to see if Dr. Mendenhall can see him preop to optimize his sugars/lipids.  Dr. Mares recommended reop CABG if preop studies are adequate.  Carotid duplex, vein mapping, echo, and CT chest without ordered.  Ideally all would be completed before discharge today.  Wife was present for discussion regarding reop CABG.  Pt/wife  are in agreement for workup for reop CABG.       Thank you for allowing us to participate in the care of this patient.       JYOTHI Adames  24  12:57 EDT     **all problems new to this examiner  **EKG and CXR independently reviewed and interpreted     ADDENDUM:     Preop studies:  Carotid duplex:  Vein mapping:  adequate  Platelet ag%  A1c:  8.7  MRSA screen:  negative     Pt seen in [facility] on  for upcoming reop CABG.  Preop studies reviewed.  Questions answered to his satisfaction.  No changes in his health hx.  He has been off his Jardiance since .  He is followed by Dr. Mendenhall and uses U-500 insulin (130 u before breakfast/ 120 u before dinner).  I have discussed this with our [facility] pharmacist and we may need to add some Lantus while he is on insulin drip.  Dr. Mendenhall will be consulted postop as well.     Electronically signed by JYOTHI Adames, 24, 3:36 PM EDT.           Electronically signed by Osmany Mares MD at [phone]

## 2024-10-02 NOTE — SIGNIFICANT NOTE
10/02/24 1517   OTHER   Discipline occupational therapist   Rehab Time/Intention   Session Not Performed other (see comments)  (Hospital dc pending)   Recommendation   OT - Next Appointment 10/03/24

## 2024-10-02 NOTE — PROGRESS NOTES
CARDIOLOGY PROGRESS NOTE:    Rodney Chaney  59 y.o.  male  1965  [id number]      Referring Provider: Cardiac surgeon    Reason for follow-up: Status post coronary artery bypass surgery     Patient Care Team:  Prabhu Downing MD as PCP - General  Prabhu Downing MD as PCP - Family Medicine  Ham Jacinto MD as Consulting Physician (Cardiology)    Subjective .  No chest pain or shortness of breath    Objective sitting in chair comfortably.  He is also ambulating very well.     Review of Systems   Constitutional: Negative for fever and malaise/fatigue.   HENT:  Negative for ear pain and nosebleeds.    Eyes:  Negative for blurred vision and double vision.   Cardiovascular:  Negative for chest pain, dyspnea on exertion and palpitations.   Respiratory:  Negative for cough and shortness of breath.    Skin:  Negative for rash.   Musculoskeletal:  Negative for joint pain.   Gastrointestinal:  Negative for abdominal pain, nausea and vomiting.   Neurological:  Negative for focal weakness and headaches.   Psychiatric/Behavioral:  Negative for depression. The patient is not nervous/anxious.    All other systems reviewed and are negative.      Allergies: Levofloxacin    Scheduled Meds:aspirin, 81 mg, Oral, Daily  chlorhexidine, 15 mL, Mouth/Throat, Q12H  enoxaparin, 40 mg, Subcutaneous, Daily  fenofibrate, 145 mg, Oral, Daily  furosemide, 40 mg, Oral, Daily  insulin lispro, 4-24 Units, Subcutaneous, TID With Meals  Insulin Regular Human (Conc), 85 Units, Subcutaneous, TID AC  metoprolol tartrate, 100 mg, Oral, Q12H  pantoprazole, 40 mg, Oral, Q AM  polyethylene glycol, 17 g, Oral, BID  rosuvastatin, 40 mg, Oral, Nightly  senna-docusate sodium, 2 tablet, Oral, BID      Continuous Infusions:     PRN Meds:.  acetaminophen **OR** acetaminophen **OR** acetaminophen    Calcium Replacement - Follow Nurse / BPA Driven Protocol    cyclobenzaprine    dextrose    dextrose    glucagon (human recombinant)    HYDROcodone-acetaminophen     "lactulose    Magnesium Cardiology Dose Replacement - Follow Nurse / BPA Driven Protocol    [] Morphine **AND** naloxone    nitroglycerin    ondansetron    Phosphorus Replacement - Follow Nurse / BPA Driven Protocol    Potassium Replacement - Follow Nurse / BPA Driven Protocol        VITAL SIGNS  Vitals:    10/02/24 0346 10/02/24 0400 10/02/24 0500 10/02/24 0730   BP: 112/71 115/67 126/74 115/68   BP Location: Left arm   Left arm   Patient Position: Lying   Lying   Pulse:  72 84    Resp: 18   18   Temp: 97.5 °F (36.4 °C)   97.5 °F (36.4 °C)   TempSrc: Oral   Oral   SpO2:  93% 93%    Weight:   84.8 kg (187 lb)    Height:           Flowsheet Rows      Flowsheet Row First Filed Value   Admission Height 182.9 cm (72.01\") Documented at 2024 1250   Admission Weight 85.3 kg (188 lb 0.8 oz) Documented at 2024 1250             TELEMETRY: Sinus rhythm    Physical Exam:  Constitutional:       Appearance: Well-developed.   Eyes:      General: No scleral icterus.     Conjunctiva/sclera: Conjunctivae normal.      Pupils: Pupils are equal, round, and reactive to light.   HENT:      Head: Normocephalic and atraumatic.   Neck:      Vascular: No carotid bruit or JVD.   Pulmonary:      Effort: Pulmonary effort is normal.      Breath sounds: Normal breath sounds. No wheezing. No rales.   Cardiovascular:      Normal rate. Regular rhythm.   Pulses:     Intact distal pulses.   Abdominal:      General: Bowel sounds are normal.      Palpations: Abdomen is soft.   Musculoskeletal: Normal range of motion.      Cervical back: Normal range of motion and neck supple. Skin:     General: Skin is warm and dry.      Findings: No rash.   Neurological:      Mental Status: Alert.      Comments: No focal deficits          Results Review:   I reviewed the patient's new clinical results.  Lab Results (last 24 hours)       Procedure Component Value Units Date/Time    POC Glucose 4x Daily Before Meals & at Bedtime [[id number]]  (Normal) " Collected: 10/02/24 0733    Specimen: Blood Updated: 10/02/24 0736     Glucose 76 mg/dL      Comment: Serial Number: [id number]:  [id number]       Basic Metabolic Panel [[id number]]  (Abnormal) Collected: 10/02/24 0305    Specimen: Blood Updated: 10/02/24 0342     Glucose 77 mg/dL      BUN 18 mg/dL      Creatinine 0.76 mg/dL      Sodium 138 mmol/L      Potassium 3.8 mmol/L      Chloride 98 mmol/L      CO2 33.6 mmol/L      Calcium 9.1 mg/dL      BUN/Creatinine Ratio 23.7     Anion Gap 6.4 mmol/L      eGFR 103.5 mL/min/1.73     Narrative:      GFR Normal >60  Chronic Kidney Disease <60  Kidney Failure <15      CBC (No Diff) [[id number]]  (Abnormal) Collected: 10/02/24 0305    Specimen: Blood Updated: 10/02/24 0318     WBC 4.96 10*3/mm3      RBC 3.85 10*6/mm3      Hemoglobin 10.6 g/dL      Hematocrit 33.3 %      MCV 86.5 fL      MCH 27.5 pg      MCHC 31.8 g/dL      RDW 14.9 %      RDW-SD 47.6 fl      MPV 10.5 fL      Platelets 141 10*3/mm3     POC Glucose Once [[id number]]  (Normal) Collected: 10/01/24 2240    Specimen: Blood Updated: 10/01/24 2242     Glucose 103 mg/dL      Comment: Serial Number: [id number]:  [id number]       POC Glucose Once [[id number]]  (Abnormal) Collected: 10/01/24 2158    Specimen: Blood Updated: 10/01/24 2201     Glucose 55 mg/dL      Comment: Serial Number: [id number]:  [id number]       POC Glucose Once [[id number]]  (Abnormal) Collected: 10/01/24 1829    Specimen: Blood Updated: 10/01/24 1831     Glucose 163 mg/dL      Comment: Serial Number: [id number]:  [id number]       POC Glucose Once [[id number]]  (Abnormal) Collected: 10/01/24 1725    Specimen: Blood Updated: 10/01/24 1727     Glucose 130 mg/dL      Comment: Serial Number: [id number]:  [id number]       POC Glucose Once [[id number]]  (Normal) Collected: 10/01/24 1654    Specimen: Blood Updated: 10/01/24 1657     Glucose 73 mg/dL      Comment: Serial Number: [id number]:  [id number]       POC Glucose Once  [[id number]]  (Abnormal) Collected: 10/01/24 1635    Specimen: Blood Updated: 10/01/24 1642     Glucose 64 mg/dL      Comment: Serial Number: [id number]:  [id number]       POC Glucose Once [[id number]]  (Abnormal) Collected: 10/01/24 1500    Specimen: Blood Updated: 10/01/24 1502     Glucose 121 mg/dL      Comment: Serial Number: [id number]:  [id number]       POC Glucose Once [[id number]]  (Abnormal) Collected: 10/01/24 1211    Specimen: Blood Updated: 10/01/24 1213     Glucose 221 mg/dL      Comment: Serial Number: [id number]:  [id number]               Imaging Results (Last 24 Hours)       Procedure Component Value Units Date/Time    XR Chest 1 View [[id number]] Resulted: 10/02/24 1016     Updated: 10/02/24 1028    XR Chest 1 View [[id number]] Collected: 10/01/24 1558     Updated: 10/01/24 1603    Narrative:      XR CHEST 1 VW    Date of Exam: 10/1/2024 3:28 PM EDT    Indication: Chest tube removal    Comparison: October 1, 2024 1214 hours    Findings:  The pigtail catheter in the left hemithorax has been removed. A pneumothorax not definitively confirmed. There is density left basilar area that could relate to atelectasis. There is also some density in the right lower lobe that may relate to   atelectasis in this area. Underlying effusions not excluded. Sternotomy wires are noted. There are some atelectatic changes in the right perihilar area. There is a shadow in the right mid chest area that probably reflects something extraneous to the   patient.    .    Impression:      Impression:  1.Pneumothorax not definitely identified.  2.Bibasilar densities which could relate to atelectasis and possibly underlying effusions. There are also some right perihilar atelectasis.      Electronically Signed: Glen Haskins MD    10/1/2024 4:01 PM EDT    Workstation ID: [id number]    XR Chest 1 View [[id number]] Collected: 10/01/24 1416     Updated: 10/01/24 1421    Narrative:      XR CHEST 1 VW    Date of Exam: 10/1/2024  12:21 PM EDT    Indication: Follow up on left PTX    Comparison: 10/1/2024 at 0550 hours    Findings:  The left chest tube is stable in position. There is no significant pneumothorax. Residual atelectasis versus infiltrates are noted in the lung bases. Small residual left pleural effusion is seen. The cardiac silhouette and mediastinum are stable.      Impression:      Impression:  1.Stable positioning of the left chest tube.  2.There is no significant pneumothorax.  3.Residual atelectasis versus infiltrates within the bilateral the lung bases. There is a small residual left pleural effusion.      Electronically Signed: Clifford Mercado MD    10/1/2024 2:19 PM EDT    Workstation ID: [id number]            EKG      I personally viewed and interpreted the patient's EKG/Telemetry data:    ECHOCARDIOGRAM:  Results for orders placed in visit on 09/26/24    Intra-Op Anesthesia EDER    Narrative  Intra-Op Anesthesia EDER    Procedure Performed: Intra-Op Anesthesia EDER  Start Time:  End Time:    Preanesthesia Checklist:  Patient identified, IV assessed, risks and benefits discussed, monitors and equipment assessed, procedure being performed at surgeon's request and anesthesia consent obtained.    General Procedure Information  EDER Placed for monitoring purposes only -- This is not a diagnostic EDER  Diagnostic Indications for Echo:  hemodynamic monitoring  Location performed:  OR  Intubated  Bite block placed  Heart visualized  Probe Insertion:  Easy  Probe Type:  Biplane  Modalities:  Color flow mapping, continuous wave Doppler and 2D only    Echocardiographic and Doppler Measurements    Ventricles    Right Ventricle:  Cavity size normal.  Hypertrophy not present.  Global function normal.  Left Ventricle:  Cavity size normal.  Global Function normal.  Ejection Fraction 55%.        Valves    Aortic Valve:  Regurgitation absent.  Leaflet motions normal.    Mitral Valve:  Annulus normal.  Regurgitation trace.  Leaflets normal.   Leaflet motions normal.    Tricuspid Valve:  Regurgitation absent.  Leaflet motions normal.      Aorta    Ascending Aorta:  Size normal.  Mobile plaque not present.  Aortic Arch:  Size normal.  Mobile plaque not present.  Descending Aorta:  Size normal.  Mobile plaque not present.      Atria    Right Atrium:  Size normal.  Left Atrium:  Size normal.                Anesthesia Information  Performed Personally  Anesthesiologist:  Benson Fisher MD      Echocardiogram Comments:  Pre CPB:         Trace MR, EF 55%  Post CPB:    no change       STRESS MYOVIEW:  Results for orders placed during the hospital encounter of 08/20/24    Stress Test With Myocardial Perfusion - One Day    Interpretation Summary    Impressions are consistent with a high risk study.    Left ventricular ejection fraction is mildly reduced (Calculated EF = 43%).    Myocardial perfusion imaging indicates a large-sized, severe area of ischemia located in the inferior wall and lateral wall.       CARDIAC CATHETERIZATION:  Results for orders placed during the hospital encounter of 08/30/24    Cardiac Catheterization/Vascular Study       OTHER:         Assessment & Plan     Coronary artery disease  Patient underwent redo coronary bypass surgery with SVG to the PDA OM and diagonal branches but have also has a LIMA from the previous surgery which is patent to the LAD  Patient is currently intubated and is off sedation  He will be extubated soon  Patient's chest tubes and Dorsey catheter draining well.  Blood pressure is currently slightly high and hence is on Cardene  Patient is extubated and started on oral medicines  Patient is also doing well ambulating.  Patient still has a chest tube but the chest x-ray showed no pneumothorax and no airleak and hence will clamp the CT and check the chest x-ray in 2 hours and then remove it for surgery.  Patient's chest tubes are removed and is ambulating very well and is tolerating oral  medicines.      Hypertension  Patient blood pressure currently stable and is on oral medicines    Hyperlipidemia  Patient patient is started on statins    Diabetes  Patient is on insulin and will be managed by the primary care doctor.    -Patient can be discharged to home and followed as an outpatient    I discussed the patients findings and my recommendations with patient's nurse  Ham Jacinto MD  10/02/24  11:06 EDT

## 2024-10-02 NOTE — PAYOR COMM NOTE
"This is clinical for Rodney Chaney   Reference/Auth#: [id number]      EXTENDED AUTHORIZATION PENDING:      Please call or fax determination to contact below.   Thank you.     ABDI Bonilla, RN, CCM  Utilization Review Nurse  [facility]  Direct & confidential phone # [phone]  Fax # [phone]    Rodney Chaney (59 y.o. Male)       Date of Birth   1965    Social Security Number       Address   [address] [place] [address] [place] [facility]    Home Phone   [phone]    MRN   [id number]       [employer]ian   M[employer]on    Marital Status                               Admission Date   9/26/24    Admission Type   Elective    Admitting Provider   Osmany Mares MD    Attending Provider   Osmany Mares MD    Department, Room/Bed   [facility], 3115/1       Discharge Date       Discharge Disposition       Discharge Destination                                 Attending Provider: Osmany Mares MD    Allergies: Levofloxacin    Isolation: None   Infection: None   Code Status: CPR    Ht: 182.9 cm (72.01\")   Wt: 84.8 kg (187 lb)    Admission Cmt: None   Principal Problem: Coronary artery disease [I25.10]                   Active Insurance as of 9/26/2024       Primary Coverage       Payor Plan Insurance Group Employer/Plan Group    [facility] [id number]       Payor Plan Address Payor Plan Phone Number Payor Plan Fax Number Effective Dates    [address] [phone]  1/1/2024 - None Entered    [place] [id number]         Subscriber Name Subscriber Birth Date Member ID       RODNEY CHANEY 1965 [id number]                     Emergency Contacts        (Rel.) Home Phone Work Phone Mobile Phone    LEOLA CHANEY (Spouse) -- -- [phone]              Operative/Procedure Notes (last 48 hours)  Notes from 09/30/24 1041 through 10/02/24 1041   No notes of this type exist for this encounter.        " "  Physician Progress Notes (last 72 hours)        Gabrielle Adams PA-C at 10/02/24 0848           LOS: 6 days   Patient Care Team:  Prabhu Downing MD as PCP - General  Prabhu Downing MD as PCP - Family Medicine  [place] Jacinto MD as Consulting Physician (Cardiology)    Chief Complaint: Post-op     Subjective     Subjective    Patient resting in bed, no new complaints this morning     Objective     Vital Signs  Temp:  [97.5 °F (36.4 °C)-97.9 °F (36.6 °C)] 97.5 °F (36.4 °C)  Heart Rate:  [] 84  Resp:  [14-23] 18  BP: ()/(39-96) 115/68  Body mass index is 25.36 kg/m².    Intake/Output Summary (Last 24 hours) at 10/2/2024 0848  Last data filed at 10/2/2024 0545  Gross per 24 hour   Intake 1921 ml   Output 1525 ml   Net 396 ml     No intake/output data recorded.      Wt Readings from Last 3 Encounters:   10/02/24 84.8 kg (187 lb)   09/23/24 85.3 kg (188 lb)   08/30/24 83.9 kg (185 lb)       Flowsheet Rows      Flowsheet Row First Filed Value   Admission Height 182.9 cm (72.01\") Documented at 09/26/2024 1130   Admission Weight 85.3 kg (188 lb 0.8 oz) Documented at 09/26/2024 1130            Objective:  General Appearance:  Comfortable, in no acute distress, well-appearing and not in pain.    Vital signs: (most recent): Blood pressure 115/68, pulse 84, temperature 97.5 °F (36.4 °C), temperature source Oral, resp. rate 18, height 182.9 cm (72.01\"), weight 84.8 kg (187 lb), SpO2 93%.  Vital signs are normal.  No fever.    Output: Producing urine and producing stool.    Lungs:  Normal effort and normal respiratory rate.  There are decreased breath sounds.    Heart: Normal rate.  Regular rhythm.    Abdomen: Bowel sounds are normal.     Extremities: Normal range of motion.  There is no dependent edema.    Neurological: Patient is alert and oriented to person, place and time.    Skin:  Warm and dry.  (Sternal incision without erythema, edema, or drainage  Left EVH incision with some erythema, no induration or drainage " ")              Results Review:        Results from last 7 days   Lab Units 10/02/24  0305 10/01/24  0322 09/30/24  0315   WBC 10*3/mm3 4.96 4.96 4.58   HEMOGLOBIN g/dL 10.6* 10.4* 9.4*   HEMATOCRIT % 33.3* 33.0* 30.2*   PLATELETS 10*3/mm3 141 119* 82*         PT/INR:  No results found for: \"PROTIME\"/No results found for: \"INR\"    Results from last 7 days   Lab Units 10/02/24  0305 10/01/24  0322 09/30/24  0315   SODIUM mmol/L 138 139 137   POTASSIUM mmol/L 3.8 4.3 4.2   CHLORIDE mmol/L 98 97* 98   CO2 mmol/L 33.6* 32.7* 31.3*   BUN mg/dL 18 20 22*   CREATININE mg/dL 0.76 0.77 0.75*   GLUCOSE mg/dL 77 221* 236*   CALCIUM mg/dL 9.1 9.4 9.0         Scheduled Meds:  aspirin, 81 mg, Oral, Daily  chlorhexidine, 15 mL, Mouth/Throat, Q12H  enoxaparin, 40 mg, Subcutaneous, Daily  fenofibrate, 145 mg, Oral, Daily  furosemide, 40 mg, Oral, Daily  insulin lispro, 4-24 Units, Subcutaneous, TID With Meals  Insulin Regular Human (Conc), 85 Units, Subcutaneous, TID AC  metoprolol tartrate, 100 mg, Oral, Q12H  pantoprazole, 40 mg, Oral, Q AM  polyethylene glycol, 17 g, Oral, BID  rosuvastatin, 40 mg, Oral, Nightly  senna-docusate sodium, 2 tablet, Oral, BID        Infusions:         Assessment & Plan       Coronary artery disease    Coronary artery disease of bypass graft of native heart with stable angina pectoris    S/P reop CABG x 3 with SVGs per Dr. Mares 9/26/2024      Assessment & Plan    - MV CAD, hx off pump CABG x3 with LIMA to LAD, SVG to distal RCA, SVG to 1st diagonal (Vishnu, 6/2011), EF 50-55% (echo)--s/p reop CABG x3 with SVG to OM1, SVG to OM2, SVG to PDA (Vishnu)  - HTN--stable  - HLD--statin/ fenofibrate, cholestyramine  - DM type 2--followed by Dr. Mendenhall, on Jardiance/ metformin/ U-500 insulin  - Hypertriglyceridemia with hx pancreatitis--last trigly 1056 (3/2024)  - Postop ABLA, expected--watch closely  - Postop TCP, consumptive--watch closely  - Postop left ptx--s/p CT placement (Dereck, 9/28)    POD6: "     Patient looks good this morning, sitting in bed on the phone with a friend   Left pleural chest tube removed yesterday without complication, CXR yesterday without PTX -- will check follow up CXR this morning   Glucoses yesterday were low -- Endocrine following, decreased insulin   Patient failed overnight oximetry last night with 14 minutes of desaturation -- Patient will require nocturnal oxygen at discharge   Metoprolol increased yesterday due to tachycardia, rate is improved this morning, tolerating well   Continue PO lasix, on asa/statin/bb, vitals remain stable  Encourage IS, mobilize, continue routine care    Will check with Endocrinology when it is ok for discharge from their standpoint along with their recommendation for DM regimen     Gabrielle Adams PA-C  10/02/24  08:48 EDT     Electronically signed by Gabrielle Adams PA-C at 10/02/24 0919       Britton Sesay MD at 10/01/24 1802              ENDOCRINE CONSULT PROGRESS NOTE  DATE OF SERVICE: 10/01/24        PATIENT NAME: Rodney Chaney  PATIENT : 1965 AGE: 59 y.o.  MRN NUMBER: [id number]    ==========================================================================    CHIEF COMPLAINT: Type 2 diabetes with severe insulin resistance    CARE TEAM:   Patient Care Team:  Prabhu Downing MD as PCP - General  Prabhu Downing MD as PCP - Family Medicine  [place] Jacinto MD as Consulting Physician (Cardiology)    SUBJECTIVE    Pt seen and examined.  Tolerating meals.  Hypoglycemia today.    ==========================================================================    CURRENT ACTIVE HOSPITAL MEDICATIONS    Scheduled Medications:  aspirin, 81 mg, Oral, Daily  chlorhexidine, 15 mL, Mouth/Throat, Q12H  enoxaparin, 40 mg, Subcutaneous, Daily  fenofibrate, 145 mg, Oral, Daily  furosemide, 40 mg, Oral, Daily  insulin lispro, 4-24 Units, Subcutaneous, TID With Meals  Insulin Regular Human (Conc), 100 Units, Subcutaneous, TID AC  metoprolol tartrate, 100 mg, Oral,  Q12H  pantoprazole, 40 mg, Oral, Q AM  polyethylene glycol, 17 g, Oral, BID  rosuvastatin, 40 mg, Oral, Nightly  senna-docusate sodium, 2 tablet, Oral, BID         PRN Medications:    acetaminophen **OR** acetaminophen **OR** acetaminophen    Calcium Replacement - Follow Nurse / BPA Driven Protocol    cyclobenzaprine    dextrose    dextrose    glucagon (human recombinant)    HYDROcodone-acetaminophen    lactulose    Magnesium Cardiology Dose Replacement - Follow Nurse / BPA Driven Protocol    [] Morphine **AND** naloxone    nitroglycerin    ondansetron    Phosphorus Replacement - Follow Nurse / BPA Driven Protocol    Potassium Replacement - Follow Nurse / BPA Driven Protocol     ==========================================================================    OBJECTIVE    Vitals:    10/01/24 1540   BP: 129/65   Pulse:    Resp: 21   Temp: 97.5 °F (36.4 °C)   SpO2:       Body mass index is 25.72 kg/m².     General - A&Ox3, NAD, Calm    ==========================================================================    LAB EVALUATION    Lab Results   Component Value Date    GLUCOSE 221 (H) 10/01/2024    BUN 20 10/01/2024    CREATININE 0.77 10/01/2024    EGFRIFNONA 76 2021    BCR 26.0 (H) 10/01/2024    K 4.3 10/01/2024    CO2 32.7 (H) 10/01/2024    CALCIUM 9.4 10/01/2024    PROTENTOTREF 6.8 2022    ALBUMIN 4.5 2024    LABIL2 CANCELED 2022    AST 25 2024    ALT 28 2024       Lab Results   Component Value Date    HGBA1C 8.70 (H) 2024    HGBA1C 8.9 (H) 02/10/2023    HGBA1C 9.6 (H) 2022     Lab Results   Component Value Date    MICROALBUR 2.1 2024    CREATININE 0.77 10/01/2024     Results from last 7 days   Lab Units 10/01/24  1725 10/01/24  1654 10/01/24  1635 10/01/24  1500 10/01/24  1211 10/01/24  0816   GLUCOSE mg/dL 130* 73 64* 121* 221* 319*     ==========================================================================    ASSESSMENT AND PLAN    # Type 2 diabetes with  hyperglycemia  - Patient had hypoglycemia this evening  - Currently on U-500 insulin  - Will decrease insulin U-500 to 85 units 3 times a day  - Continue sliding scale  -Will review blood sugar for next 24 hours and adjust therapy accordingly    # Coronary artery disease status post CABG      Will follow with you.  Rest as per primary team.    Part of this note may be an electronic transcription/translation of spoken language to printed text using the Dragon Dictation System.     Note: Portions of this note may have been copied from previous notes but documentation have been reviewed and edited as necessary to support clinical decision making for today's visit.  The time of this note does not reflect the time I saw the patient but the time that this note was written.    ==========================================================================  Britton Sesay MD  Department of Endocrine, Diabetes and Metabolism  [place], [place]  ==========================================================================      Electronically signed by Britton Sesay MD at 10/01/24 1990       Ham Jacinto MD at 10/01/24 1207          CARDIOLOGY PROGRESS NOTE:    Rodney Chaney  59 y.o.  male  1965  [id number]      Referring Provider: Cardiac surgeon    Reason for follow-up: Status post coronary artery bypass surgery     Patient Care Team:  Prabhu Downing MD as PCP - General  Prabhu Downing MD as PCP - Family Medicine  Ham Jacinto MD as Consulting Physician (Cardiology)    Subjective .  No chest pain or shortness of breath    Objective sitting in chair comfortably     Review of Systems   Constitutional: Negative for fever and malaise/fatigue.   HENT:  Negative for ear pain and nosebleeds.    Eyes:  Negative for blurred vision and double vision.   Cardiovascular:  Negative for chest pain, dyspnea on exertion and palpitations.   Respiratory:  Negative for cough and shortness of breath.    Skin:  Negative for rash.  "  Musculoskeletal:  Negative for joint pain.   Gastrointestinal:  Negative for abdominal pain, nausea and vomiting.   Neurological:  Negative for focal weakness and headaches.   Psychiatric/Behavioral:  Negative for depression. The patient is not nervous/anxious.    All other systems reviewed and are negative.      Allergies: Levofloxacin    Scheduled Meds:aspirin, 81 mg, Oral, Daily  chlorhexidine, 15 mL, Mouth/Throat, Q12H  enoxaparin, 40 mg, Subcutaneous, Daily  fenofibrate, 145 mg, Oral, Daily  furosemide, 40 mg, Oral, Daily  insulin lispro, 4-24 Units, Subcutaneous, TID With Meals  Insulin Regular Human (Conc), 90 Units, Subcutaneous, TID AC  metoprolol tartrate, 75 mg, Oral, Q12H  pantoprazole, 40 mg, Oral, Q AM  polyethylene glycol, 17 g, Oral, BID  rosuvastatin, 40 mg, Oral, Nightly  senna-docusate sodium, 2 tablet, Oral, BID      Continuous Infusions:     PRN Meds:.  acetaminophen **OR** acetaminophen **OR** acetaminophen    Calcium Replacement - Follow Nurse / BPA Driven Protocol    cyclobenzaprine    dextrose    dextrose    glucagon (human recombinant)    HYDROcodone-acetaminophen    lactulose    Magnesium Cardiology Dose Replacement - Follow Nurse / BPA Driven Protocol    [] Morphine **AND** naloxone    nitroglycerin    ondansetron    Phosphorus Replacement - Follow Nurse / BPA Driven Protocol    Potassium Replacement - Follow Nurse / BPA Driven Protocol        VITAL SIGNS  Vitals:    10/01/24 0800 10/01/24 0812 10/01/24 0846 10/01/24 0900   BP: 128/71 128/71  136/78   BP Location:  Left arm     Patient Position:  Sitting     Pulse: 92  108 103   Resp:  19     Temp:  97.5 °F (36.4 °C)     TempSrc:  Axillary     SpO2: 98%   97%   Weight:       Height:           Flowsheet Rows      Flowsheet Row First Filed Value   Admission Height 182.9 cm (72.01\") Documented at 2024 1250   Admission Weight 85.3 kg (188 lb 0.8 oz) Documented at 2024 1250             TELEMETRY: Sinus rhythm    Physical " Exam:  Constitutional:       Appearance: Well-developed.   Eyes:      General: No scleral icterus.     Conjunctiva/sclera: Conjunctivae normal.      Pupils: Pupils are equal, round, and reactive to light.   HENT:      Head: Normocephalic and atraumatic.   Neck:      Vascular: No carotid bruit or JVD.   Pulmonary:      Effort: Pulmonary effort is normal.      Breath sounds: Normal breath sounds. No wheezing. No rales.   Cardiovascular:      Normal rate. Regular rhythm.   Pulses:     Intact distal pulses.   Abdominal:      General: Bowel sounds are normal.      Palpations: Abdomen is soft.   Musculoskeletal: Normal range of motion.      Cervical back: Normal range of motion and neck supple. Skin:     General: Skin is warm and dry.      Findings: No rash.   Neurological:      Mental Status: Alert.      Comments: No focal deficits          Results Review:   I reviewed the patient's new clinical results.  Lab Results (last 24 hours)       Procedure Component Value Units Date/Time    POC Glucose 4x Daily Before Meals & at Bedtime [[id number]]  (Abnormal) Collected: 10/01/24 0816    Specimen: Blood Updated: 10/01/24 0818     Glucose 319 mg/dL      Comment: Serial Number: [id number]:  [id number]       Basic Metabolic Panel [[id number]]  (Abnormal) Collected: 10/01/24 0322    Specimen: Blood Updated: 10/01/24 0403     Glucose 221 mg/dL      BUN 20 mg/dL      Creatinine 0.77 mg/dL      Sodium 139 mmol/L      Potassium 4.3 mmol/L      Chloride 97 mmol/L      CO2 32.7 mmol/L      Calcium 9.4 mg/dL      BUN/Creatinine Ratio 26.0     Anion Gap 9.3 mmol/L      eGFR 103.1 mL/min/1.73     Narrative:      GFR Normal >60  Chronic Kidney Disease <60  Kidney Failure <15      CBC (No Diff) [[id number]]  (Abnormal) Collected: 10/01/24 0322    Specimen: Blood Updated: 10/01/24 0356     WBC 4.96 10*3/mm3      RBC 3.76 10*6/mm3      Hemoglobin 10.4 g/dL      Hematocrit 33.0 %      MCV 87.8 fL      MCH 27.7 pg      MCHC 31.5 g/dL      RDW  14.8 %      RDW-SD 47.6 fl      MPV 10.6 fL      Platelets 119 10*3/mm3      Comment: Result checked         POC Glucose Once [[id number]]  (Abnormal) Collected: 09/30/24 1923    Specimen: Blood Updated: 09/30/24 1925     Glucose 273 mg/dL      Comment: Serial Number: [id number]:  [id number]       POC Glucose Once [[id number]]  (Abnormal) Collected: 09/30/24 1640    Specimen: Blood Updated: 09/30/24 1644     Glucose 288 mg/dL      Comment: Serial Number: [id number]:  [id number]       POC Glucose Once [[id number]]  (Abnormal) Collected: 09/30/24 1519    Specimen: Blood Updated: 09/30/24 1521     Glucose 324 mg/dL      Comment: Serial Number: [id number]:  [id number]               Imaging Results (Last 24 Hours)       Procedure Component Value Units Date/Time    XR Chest 1 View [[id number]] Collected: 10/01/24 0630     Updated: 10/01/24 0633    Narrative:      XR CHEST 1 VW    Date of Exam: 10/1/2024 5:40 AM EDT    Indication: FU pneumothorax    Comparison: 9/30/2024    Findings:  Appearance of the thorax is similar since 9/30/2024. Patient is status post median sternotomy and CABG. There is an indwelling left pleural pigtail catheter in similar position. Scattered bibasilar atelectasis. No obvious pneumothorax. Cardiac silhouette   is unremarkable. Osseous structures are unchanged.      Impression:      Impression:  1.No significant change since 9/30/2024. Indwelling left pleural pigtail catheter. No obvious pneumothorax.      Electronically Signed: Chris Lo MD    10/1/2024 6:31 AM EDT    Workstation ID: [id number]    XR Chest 1 View [[id number]] Collected: 09/30/24 1654     Updated: 09/30/24 1658    Narrative:      XR CHEST 1 VW    Date of Exam: 9/30/2024 4:15 PM EDT    Indication: chest tube to water seal    Comparison: Chest radiograph 9/30/2024    Findings:  Lung volumes are low with nonspecific bibasilar opacities similar to prior possibly representing atelectasis. Elevated left hemidiaphragm  stable from prior. Stable cardiomediastinal silhouette. Prior median sternotomy and CABG. Left thoracotomy tube in   stable position. No appreciable pneumothorax. Nonspecific blunting of costophrenic angles, difficult to exclude trace effusions and would be unchanged from prior. Degenerative related osseous changes. No new focal consolidation or worsening edema.      Impression:      Impression:  Stable left thoracotomy tube without appreciable pneumothorax.      Electronically Signed: Haim Munoz MD    9/30/2024 4:56 PM EDT    Workstation ID: [id number]            EKG      I personally viewed and interpreted the patient's EKG/Telemetry data:    ECHOCARDIOGRAM:  Results for orders placed in visit on 09/26/24    Intra-Op Anesthesia EDER    Narrative  Intra-Op Anesthesia EDER    Procedure Performed: Intra-Op Anesthesia EDER  Start Time:  End Time:    Preanesthesia Checklist:  Patient identified, IV assessed, risks and benefits discussed, monitors and equipment assessed, procedure being performed at surgeon's request and anesthesia consent obtained.    General Procedure Information  EDER Placed for monitoring purposes only -- This is not a diagnostic EDER  Diagnostic Indications for Echo:  hemodynamic monitoring  Location performed:  OR  Intubated  Bite block placed  Heart visualized  Probe Insertion:  Easy  Probe Type:  Biplane  Modalities:  Color flow mapping, continuous wave Doppler and 2D only    Echocardiographic and Doppler Measurements    Ventricles    Right Ventricle:  Cavity size normal.  Hypertrophy not present.  Global function normal.  Left Ventricle:  Cavity size normal.  Global Function normal.  Ejection Fraction 55%.        Valves    Aortic Valve:  Regurgitation absent.  Leaflet motions normal.    Mitral Valve:  Annulus normal.  Regurgitation trace.  Leaflets normal.  Leaflet motions normal.    Tricuspid Valve:  Regurgitation absent.  Leaflet motions normal.      Aorta    Ascending Aorta:  Size normal.   Mobile plaque not present.  Aortic Arch:  Size normal.  Mobile plaque not present.  Descending Aorta:  Size normal.  Mobile plaque not present.      Atria    Right Atrium:  Size normal.  Left Atrium:  Size normal.                Anesthesia Information  Performed Personally  Anesthesiologist:  Benson Fisher MD      Echocardiogram Comments:  Pre CPB:         Trace MR, EF 55%  Post CPB:    no change       STRESS MYOVIEW:  Results for orders placed during the hospital encounter of 08/20/24    Stress Test With Myocardial Perfusion - One Day    Interpretation Summary    Impressions are consistent with a high risk study.    Left ventricular ejection fraction is mildly reduced (Calculated EF = 43%).    Myocardial perfusion imaging indicates a large-sized, severe area of ischemia located in the inferior wall and lateral wall.       CARDIAC CATHETERIZATION:  Results for orders placed during the hospital encounter of 08/30/24    Cardiac Catheterization/Vascular Study       OTHER:         Assessment & Plan     Coronary artery disease  Patient underwent redo coronary bypass surgery with SVG to the PDA OM and diagonal branches but have also has a LIMA from the previous surgery which is patent to the LAD  Patient is currently intubated and is off sedation  He will be extubated soon  Patient's chest tubes and Dorsey catheter draining well.  Blood pressure is currently slightly high and hence is on Cardene  Patient is extubated and started on oral medicines  Patient is also doing well ambulating.  Patient still has a chest tube but the chest x-ray showed no pneumothorax and no airleak and hence will clamp the CT and check the chest x-ray in 2 hours and then remove it for surgery.      Hypertension  Patient blood pressure currently stable and is on oral medicines    Hyperlipidemia  Patient patient is started on statins    Diabetes  Patient is on insulin and will be managed by the primary care doctor.    -    I discussed the  "patients findings and my recommendations with patient's nurse  [place] Jacinto MD  10/01/24  12:07 EDT                Electronically signed by Ham Jacinto MD at 10/01/24 1207       Gabrielle Adams PA-C at 10/01/24 0839           LOS: 5 days   Patient Care Team:  Prabhu Downing MD as PCP - General  Prabhu Downing MD as PCP - Family Medicine  Ham Jacinto MD as Consulting Physician (Cardiology)    Chief Complaint: Post-op     Subjective     Subjective    Patient sitting in bedside chair, no new complaints     Objective     Vital Signs  Temp:  [97.5 °F (36.4 °C)-98 °F (36.7 °C)] 97.5 °F (36.4 °C)  Heart Rate:  [74-96] 74  Resp:  [16-30] 19  BP: (106-144)/(61-79) 128/71  Body mass index is 25.72 kg/m².    Intake/Output Summary (Last 24 hours) at 10/1/2024 0839  Last data filed at 10/1/2024 0812  Gross per 24 hour   Intake 1210 ml   Output 2820 ml   Net -1610 ml     I/O this shift:  In: 350 [P.O.:350]  Out: -     Chest tube drainage last 8 hours -- 40mL    Wt Readings from Last 3 Encounters:   10/01/24 86 kg (189 lb 11.2 oz)   09/23/24 85.3 kg (188 lb)   08/30/24 83.9 kg (185 lb)       Flowsheet Rows      Flowsheet Row First Filed Value   Admission Height 182.9 cm (72.01\") Documented at 09/26/2024 1130   Admission Weight 85.3 kg (188 lb 0.8 oz) Documented at 09/26/2024 1130            Objective:  General Appearance:  Comfortable and in no acute distress.    Vital signs: (most recent): Blood pressure 136/78, pulse 103, temperature 97.5 °F (36.4 °C), temperature source Axillary, resp. rate 19, height 182.9 cm (72.01\"), weight 86 kg (189 lb 11.2 oz), SpO2 97%.  Vital signs are normal.  No fever.    Output: Producing urine and producing stool.    Lungs:  Normal effort and normal respiratory rate.  There are decreased breath sounds.    Heart: Normal rate.  Regular rhythm.    Abdomen: Bowel sounds are normal.     Extremities: Normal range of motion.  There is no dependent edema.    Neurological: Patient is alert and oriented " "to person, place and time.    Skin:  Warm and dry.  (Sternal incision without erythema, edema, or drainage )              Results Review:        Results from last 7 days   Lab Units 10/01/24  0322 09/30/24  0315 09/29/24  0426   WBC 10*3/mm3 4.96 4.58 6.48   HEMOGLOBIN g/dL 10.4* 9.4* 9.7*   HEMATOCRIT % 33.0* 30.2* 31.2*   PLATELETS 10*3/mm3 119* 82* 74*         PT/INR:  No results found for: \"PROTIME\"/No results found for: \"INR\"    Results from last 7 days   Lab Units 10/01/24  0322 09/30/24  0315 09/29/24  0426   SODIUM mmol/L 139 137 140   POTASSIUM mmol/L 4.3 4.2 4.0   CHLORIDE mmol/L 97* 98 101   CO2 mmol/L 32.7* 31.3* 32.8*   BUN mg/dL 20 22* 16   CREATININE mg/dL 0.77 0.75* 0.65*   GLUCOSE mg/dL 221* 236* 114*   CALCIUM mg/dL 9.4 9.0 8.7         Scheduled Meds:  aspirin, 81 mg, Oral, Daily  chlorhexidine, 15 mL, Mouth/Throat, Q12H  enoxaparin, 40 mg, Subcutaneous, Daily  fenofibrate, 145 mg, Oral, Daily  furosemide, 40 mg, Oral, Daily  insulin lispro, 4-24 Units, Subcutaneous, TID With Meals  Insulin Regular Human (Conc), 90 Units, Subcutaneous, TID AC  metoprolol tartrate, 75 mg, Oral, Q12H  mupirocin, , Each Nare, BID  pantoprazole, 40 mg, Oral, Q AM  polyethylene glycol, 17 g, Oral, BID  rosuvastatin, 40 mg, Oral, Nightly  senna-docusate sodium, 2 tablet, Oral, BID        Infusions:         Assessment & Plan       Coronary artery disease    Coronary artery disease of bypass graft of native heart with stable angina pectoris    S/P reop CABG x 3 with SVGs per Dr. Mares 9/26/2024      Assessment & Plan    - MV CAD, hx off pump CABG x3 with LIMA to LAD, SVG to distal RCA, SVG to 1st diagonal (Vishnu, 6/2011), EF 50-55% (echo)--s/p reop CABG x3 with SVG to OM1, SVG to OM2, SVG to PDA (Pagni)  - HTN--stable  - HLD--statin/ fenofibrate, cholestyramine  - DM type 2--followed by Dr. Mendenhall, on Jardiance/ metformin/ U-500 insulin  - Hypertriglyceridemia with hx pancreatitis--last trigly 1056 (3/2024)  - Postop ABLA, " expected--watch closely  - Postop TCP, consumptive--watch closely  - Postop left ptx--s/p CT placement (Dereck, )    POD5:     Patient looks good this morning, sitting in bedside chair   Patient still with left pleural chest tube (pigtail) on waterseal, CXR without PTX and no air leak -- will clamp CT and check CXR in 2 hours   Hyperglycemia -- Endocrinology managing insulin, increased yesterday  Patient has had BM, continue PO lasix  On asa/statin/bb, vitals remain stable, will order overnight oximetry for tonight incase chest tube is removed later today  Encourage IS, mobilize, continue routine care    Anticipate home with family assist soon pending progress     Gabrielle Adams PA-C  10/01/24  08:39 EDT     Electronically signed by Gabrielle Adams PA-C at 10/01/24 104       Britton Sesay MD at [phone]              ENDOCRINE CONSULT PROGRESS NOTE  DATE OF SERVICE: 24        PATIENT NAME: Rodney Chaney  PATIENT : 1965 AGE: 59 y.o.  MRN NUMBER: [id number]    ==========================================================================    CHIEF COMPLAINT: Type 2 diabetes with severe insulin resistance    CARE TEAM:   Patient Care Team:  Prabhu Downing MD as PCP - General  Prabhu Downing MD as PCP - Family Medicine  Ham Jacinto MD as Consulting Physician (Cardiology)    SUBJECTIVE    Pt seen and examined.  Tolerating meals.    ==========================================================================    CURRENT ACTIVE HOSPITAL MEDICATIONS    Scheduled Medications:  aspirin, 81 mg, Oral, Daily  chlorhexidine, 15 mL, Mouth/Throat, Q12H  [START ON 10/1/2024] enoxaparin, 40 mg, Subcutaneous, Daily  fenofibrate, 145 mg, Oral, Daily  furosemide, 40 mg, Oral, Daily  insulin lispro, 4-24 Units, Subcutaneous, TID With Meals  Insulin Regular Human (Conc), 90 Units, Subcutaneous, TID AC  metoprolol tartrate, 75 mg, Oral, Q12H  mupirocin, , Each Nare, BID  pantoprazole, 40 mg, Oral, Q AM  polyethylene  glycol, 17 g, Oral, BID  rosuvastatin, 40 mg, Oral, Nightly  senna-docusate sodium, 2 tablet, Oral, BID         PRN Medications:    acetaminophen **OR** acetaminophen **OR** acetaminophen    Calcium Replacement - Follow Nurse / BPA Driven Protocol    cyclobenzaprine    dextrose    dextrose    glucagon (human recombinant)    HYDROcodone-acetaminophen    lactulose    Magnesium Cardiology Dose Replacement - Follow Nurse / BPA Driven Protocol    [] Morphine **AND** naloxone    nitroglycerin    ondansetron    Phosphorus Replacement - Follow Nurse / BPA Driven Protocol    Potassium Replacement - Follow Nurse / BPA Driven Protocol     ==========================================================================    OBJECTIVE    Vitals:    24 1516   BP:    Pulse:    Resp: (!) 30   Temp: 97.6 °F (36.4 °C)   SpO2:       Body mass index is 26.2 kg/m².     General - A&Ox3, NAD, Calm    ==========================================================================    LAB EVALUATION    Lab Results   Component Value Date    GLUCOSE 236 (H) 2024    BUN 22 (H) 2024    CREATININE 0.75 (L) 2024    EGFRIFNONA 76 2021    BCR 29.3 (H) 2024    K 4.2 2024    CO2 31.3 (H) 2024    CALCIUM 9.0 2024    PROTENTOTREF 6.8 2022    ALBUMIN 4.5 2024    LABIL2 CANCELED 2022    AST 25 2024    ALT 28 2024       Lab Results   Component Value Date    HGBA1C 8.70 (H) 2024    HGBA1C 8.9 (H) 02/10/2023    HGBA1C 9.6 (H) 2022     Lab Results   Component Value Date    MICROALBUR 2.1 2024    CREATININE 0.75 (L) 2024     Results from last 7 days   Lab Units 24  1640 24  1519 24  1142 24  0738 24  0010 24  2210   GLUCOSE mg/dL 288* 324* 247* 232* 256* 298*     ==========================================================================    ASSESSMENT AND PLAN    # Type 2 diabetes with hyperglycemia  - Patient blood sugar  continues to be significantly elevated  - Currently on U-500 insulin  - Increase insulin U-500 therapy to 90 units 3 times a day  - Patient prior to hospitalization was maintained on 130 units with each meal  - Will continue to review blood sugar and adjust therapy accordingly  - Continue sliding scale    # Coronary artery disease status post CABG      Will follow with you.  Rest as per primary team.    Part of this note may be an electronic transcription/translation of spoken language to printed text using the Dragon Dictation System.     Note: Portions of this note may have been copied from previous notes but documentation have been reviewed and edited as necessary to support clinical decision making for today's visit.  The time of this note does not reflect the time I saw the patient but the time that this note was written.    ==========================================================================  Britton Sesay MD  Department of Endocrine, Diabetes and Metabolism  [place], [place]  ==========================================================================      Electronically signed by Britton Sesay MD at 09/30/24 1830       Ham Jacinto MD at 09/30/24 1315          CARDIOLOGY PROGRESS NOTE:    Rodney Chaney  59 y.o.  male  1965  [id number]      Referring Provider: Cardiac surgeon    Reason for follow-up: Status post coronary artery bypass surgery     Patient Care Team:  Prabhu Downing MD as PCP - General  Prabhu Downing MD as PCP - Family Medicine  Ham Jacinto MD as Consulting Physician (Cardiology)    Subjective .  No chest pain or shortness of breath    Objective sitting in chair comfortably     Review of Systems   Constitutional: Negative for fever and malaise/fatigue.   HENT:  Negative for ear pain and nosebleeds.    Eyes:  Negative for blurred vision and double vision.   Cardiovascular:  Negative for chest pain, dyspnea on exertion and palpitations.   Respiratory:  Negative  "for cough and shortness of breath.    Skin:  Negative for rash.   Musculoskeletal:  Negative for joint pain.   Gastrointestinal:  Negative for abdominal pain, nausea and vomiting.   Neurological:  Negative for focal weakness and headaches.   Psychiatric/Behavioral:  Negative for depression. The patient is not nervous/anxious.    All other systems reviewed and are negative.      Allergies: Levofloxacin    Scheduled Meds:aspirin, 81 mg, Oral, Daily  chlorhexidine, 15 mL, Mouth/Throat, Q12H  [START ON 10/1/2024] enoxaparin, 40 mg, Subcutaneous, Daily  fenofibrate, 145 mg, Oral, Daily  furosemide, 40 mg, Oral, Daily  insulin lispro, 4-24 Units, Subcutaneous, TID With Meals  Insulin Regular Human (Conc), 80 Units, Subcutaneous, TID AC  metoprolol tartrate, 75 mg, Oral, Q12H  mupirocin, , Each Nare, BID  pantoprazole, 40 mg, Oral, Q AM  polyethylene glycol, 17 g, Oral, BID  rosuvastatin, 40 mg, Oral, Nightly  senna-docusate sodium, 2 tablet, Oral, BID      Continuous Infusions:     PRN Meds:.  acetaminophen **OR** acetaminophen **OR** acetaminophen    Calcium Replacement - Follow Nurse / BPA Driven Protocol    cyclobenzaprine    dextrose    dextrose    glucagon (human recombinant)    HYDROcodone-acetaminophen    lactulose    Magnesium Cardiology Dose Replacement - Follow Nurse / BPA Driven Protocol    [] Morphine **AND** naloxone    nitroglycerin    ondansetron    Phosphorus Replacement - Follow Nurse / BPA Driven Protocol    Potassium Replacement - Follow Nurse / BPA Driven Protocol        VITAL SIGNS  Vitals:    24 1000 24 1100 24 1139 24 1200   BP: 116/70 124/67 124/67 121/67   BP Location:   Right arm    Patient Position:   Lying    Pulse: 76 82 78 77   Resp:   24    Temp:   97.7 °F (36.5 °C)    TempSrc:   Oral    SpO2: 92% (!) 0% 96% 97%   Weight:       Height:           Flowsheet Rows      Flowsheet Row First Filed Value   Admission Height 182.9 cm (72.01\") Documented at 2024 " 1250   Admission Weight 85.3 kg (188 lb 0.8 oz) Documented at 09/26/2024 1250             TELEMETRY: Sinus rhythm    Physical Exam:  Constitutional:       Appearance: Well-developed.   Eyes:      General: No scleral icterus.     Conjunctiva/sclera: Conjunctivae normal.      Pupils: Pupils are equal, round, and reactive to light.   HENT:      Head: Normocephalic and atraumatic.   Neck:      Vascular: No carotid bruit or JVD.   Pulmonary:      Effort: Pulmonary effort is normal.      Breath sounds: Normal breath sounds. No wheezing. No rales.   Cardiovascular:      Normal rate. Regular rhythm.   Pulses:     Intact distal pulses.   Abdominal:      General: Bowel sounds are normal.      Palpations: Abdomen is soft.   Musculoskeletal: Normal range of motion.      Cervical back: Normal range of motion and neck supple. Skin:     General: Skin is warm and dry.      Findings: No rash.   Neurological:      Mental Status: Alert.      Comments: No focal deficits          Results Review:   I reviewed the patient's new clinical results.  Lab Results (last 24 hours)       Procedure Component Value Units Date/Time    POC Glucose Once [[id number]]  (Abnormal) Collected: 09/30/24 1142    Specimen: Blood Updated: 09/30/24 1153     Glucose 247 mg/dL      Comment: Serial Number: [id number]:  [id number]       POC Glucose 4x Daily Before Meals & at Bedtime [[id number]]  (Abnormal) Collected: 09/30/24 0738    Specimen: Blood Updated: 09/30/24 0750     Glucose 232 mg/dL      Comment: Serial Number: [id number]:  [id number]       Basic Metabolic Panel [[id number]]  (Abnormal) Collected: 09/30/24 0315    Specimen: Blood Updated: 09/30/24 0417     Glucose 236 mg/dL      BUN 22 mg/dL      Creatinine 0.75 mg/dL      Sodium 137 mmol/L      Potassium 4.2 mmol/L      Chloride 98 mmol/L      CO2 31.3 mmol/L      Calcium 9.0 mg/dL      BUN/Creatinine Ratio 29.3     Anion Gap 7.7 mmol/L      eGFR 104.0 mL/min/1.73     Narrative:      GFR  Normal >60  Chronic Kidney Disease <60  Kidney Failure <15      CBC (No Diff) [[id number]]  (Abnormal) Collected: 09/30/24 0315    Specimen: Blood Updated: 09/30/24 0328     WBC 4.58 10*3/mm3      RBC 3.42 10*6/mm3      Hemoglobin 9.4 g/dL      Hematocrit 30.2 %      MCV 88.3 fL      MCH 27.5 pg      MCHC 31.1 g/dL      RDW 15.2 %      RDW-SD 49.2 fl      MPV 11.1 fL      Platelets 82 10*3/mm3     POC Glucose Once [[id number]]  (Abnormal) Collected: 09/30/24 0010    Specimen: Blood Updated: 09/30/24 0011     Glucose 256 mg/dL      Comment: Serial Number: [id number]:  [id number]       POC Glucose Once [[id number]]  (Abnormal) Collected: 09/29/24 2210    Specimen: Blood Updated: 09/29/24 2211     Glucose 298 mg/dL      Comment: Serial Number: [id number]:  [id number]       POC Glucose Once [[id number]]  (Abnormal) Collected: 09/29/24 2041    Specimen: Blood Updated: 09/29/24 2043     Glucose 300 mg/dL      Comment: Serial Number: [id number]:  [id number]       POC Glucose Once [[id number]]  (Abnormal) Collected: 09/29/24 1626    Specimen: Blood Updated: 09/29/24 1628     Glucose 256 mg/dL      Comment: Serial Number: [id number]:  [id number]               Imaging Results (Last 24 Hours)       Procedure Component Value Units Date/Time    XR Chest 1 View [[id number]] Collected: 09/30/24 0725     Updated: 09/30/24 0729    Narrative:      XR CHEST 1 VW    Date of Exam: 9/30/2024 2:01 AM EDT    Indication: FU pneumothorax    Comparison: 9/29/2024    Findings:  Stable position of chest tube overlying the left midlung. Postsurgical changes of sternotomy and CABG. Lungs hypoinflated with mild bibasilar atelectasis similar to the prior study. Trace residual right apical pneumothorax measuring 6 mm. No discrete   pneumothorax on the left. No significant effusion.      Impression:      Impression:  1. Trace residual right apical pneumothorax measuring 6 mm.  2. Stable position of left-sided chest tube. No  pneumothorax on the left.  3. Hypoinflated lungs with mild bibasilar atelectasis.      Electronically Signed: Baljinder Chacon MD    9/30/2024 7:27 AM EDT    Workstation ID: [id number]    XR Chest 1 View [[id number]] Collected: 09/29/24 1426     Updated: 09/29/24 1430    Narrative:      DATE OF EXAM:  9/29/2024 9:24 AM     PROCEDURE:  XR CHEST 1 VW-     INDICATIONS:  FU pneumothorax; I25.708-Atherosclerosis of coronary artery bypass  graft(s), unspecified, with other forms of angina pectoris     COMPARISON:  September 28, 2024.     TECHNIQUE:   Single radiographic view of the chest was obtained.     FINDINGS:  Previously seen right apical pneumothorax is not clearly visualized.  Left chest tube appears in stable position. No left pneumothorax is  identified. Low lung volumes with perihilar and bibasilar opacities, as  before. Heart size and mediastinal contour appear grossly unchanged.  Status post median sternotomy and CABG. No significant new pleural  effusion. No definite new infiltrate.       Impression:      1.     Trace right apical pneumothorax on the prior chest radiograph is  not clearly visible on this exam.  2.     Low lung volumes with persistent perihilar and bibasilar  opacities.  3.     Left chest tube in similar position. No definite left  pneumothorax.        Electronically Signed By-Tanner Benavidez MD On:9/29/2024 2:28 PM               EKG      I personally viewed and interpreted the patient's EKG/Telemetry data:    ECHOCARDIOGRAM:  Results for orders placed in visit on 09/26/24    Intra-Op Anesthesia EDER    Narrative  Intra-Op Anesthesia EDER    Procedure Performed: Intra-Op Anesthesia EDER  Start Time:  End Time:    Preanesthesia Checklist:  Patient identified, IV assessed, risks and benefits discussed, monitors and equipment assessed, procedure being performed at surgeon's request and anesthesia consent obtained.    General Procedure Information  EDER Placed for monitoring purposes only -- This is not a  diagnostic EDER  Diagnostic Indications for Echo:  hemodynamic monitoring  Location performed:  OR  Intubated  Bite block placed  Heart visualized  Probe Insertion:  Easy  Probe Type:  Biplane  Modalities:  Color flow mapping, continuous wave Doppler and 2D only    Echocardiographic and Doppler Measurements    Ventricles    Right Ventricle:  Cavity size normal.  Hypertrophy not present.  Global function normal.  Left Ventricle:  Cavity size normal.  Global Function normal.  Ejection Fraction 55%.        Valves    Aortic Valve:  Regurgitation absent.  Leaflet motions normal.    Mitral Valve:  Annulus normal.  Regurgitation trace.  Leaflets normal.  Leaflet motions normal.    Tricuspid Valve:  Regurgitation absent.  Leaflet motions normal.      Aorta    Ascending Aorta:  Size normal.  Mobile plaque not present.  Aortic Arch:  Size normal.  Mobile plaque not present.  Descending Aorta:  Size normal.  Mobile plaque not present.      Atria    Right Atrium:  Size normal.  Left Atrium:  Size normal.                Anesthesia Information  Performed Personally  Anesthesiologist:  Benson Fisher MD      Echocardiogram Comments:  Pre CPB:         Trace MR, EF 55%  Post CPB:    no change       STRESS MYOVIEW:  Results for orders placed during the hospital encounter of 08/20/24    Stress Test With Myocardial Perfusion - One Day    Interpretation Summary    Impressions are consistent with a high risk study.    Left ventricular ejection fraction is mildly reduced (Calculated EF = 43%).    Myocardial perfusion imaging indicates a large-sized, severe area of ischemia located in the inferior wall and lateral wall.       CARDIAC CATHETERIZATION:  Results for orders placed during the hospital encounter of 08/30/24    Cardiac Catheterization/Vascular Study       OTHER:         Assessment & Plan     Coronary artery disease  Patient underwent redo coronary bypass surgery with SVG to the PDA OM and diagonal branches but have also has a  LIMA from the previous surgery which is patent to the LAD  Patient is currently intubated and is off sedation  He will be extubated soon  Patient's chest tubes and Dorsey catheter draining well.  Blood pressure is currently slightly high and hence is on Cardene  Patient is extubated and started on oral medicines  Patient is also doing well ambulating.  He still has 1 chest tube which will be removed today.  Blood pressure and heart rate stable.      Hypertension  Patient blood pressure currently stable and is on oral medicines    Hyperlipidemia  Patient patient is started on statins    Diabetes  Patient is on insulin and will be managed by the primary care doctor.    -    I discussed the patients findings and my recommendations with patient's nurse  Ham Jacinto MD  09/30/24  13:15 EDT                Electronically signed by Ham Jacinto MD at 09/30/24 1318       Osmany Mares MD at 09/30/24 1006          S/P POD# 4 reop CABG--Vishnu  EF 50-55% (echo)    Subjective:  no c/o's    Developed left ptx requiring CT placement 9/28  Glucoses 232-300 last 24 hrs  Wt is up 2 kgs from preop  CXR:  trace right apical ptx, no residual left ptx      Intake/Output Summary (Last 24 hours) at 9/30/2024 1006  Last data filed at 9/30/2024 0802  Gross per 24 hour   Intake 1400 ml   Output 2625 ml   Net -1225 ml     Temp:  [97.5 °F (36.4 °C)-98.6 °F (37 °C)] 97.5 °F (36.4 °C)  Heart Rate:  [67-95] 76  Resp:  [13-25] 13  BP: (100-135)/(59-75) 116/70  L PCT  (for ptx) 150/24 (nothing recorded 11p-7a)    Results from last 7 days   Lab Units 09/30/24  0315 09/29/24  0426 09/28/24  0350 09/27/24  0320 09/26/24  1229 09/26/24  1129 09/26/24  1037   WBC 10*3/mm3 4.58 6.48   < > 6.75   < > 5.82  --    HEMOGLOBIN g/dL 9.4* 9.7*   < > 8.5*   < > 9.6*  --    HEMOGLOBIN, POC   --   --   --   --    < >  --   --    HEMATOCRIT % 30.2* 31.2*   < > 26.9*   < > 29.1*  --    HEMATOCRIT POC   --   --   --   --    < >  --   --    PLATELETS 10*3/mm3  82* 74*   < > 71*   < > 106*  --    INR   --   --   --  1.41*  --  1.24* 1.27*    < > = values in this interval not displayed.     Results from last 7 days   Lab Units 09/30/24  0315 09/28/24  0350 09/27/24  0414 09/27/24  0320   CREATININE mg/dL 0.75*   < > 0.87  --    POTASSIUM mmol/L 4.2   < > 4.3  --    SODIUM mmol/L 137   < > 143  --    MAGNESIUM mg/dL  --   --   --  1.8   PHOSPHORUS mg/dL  --   --  4.1  --     < > = values in this interval not displayed.     Physical Exam:  Neuro intact, nad, up in chair  Tele:  SR 90s  Diminished bases, 94% 1L  Sternotomy/ LLE SVHS healing well  Benign abd, no BM, + flatus  No edema    Assessment/Plan:  Principal Problem:    Coronary artery disease  Active Problems:    Coronary artery disease of bypass graft of native heart with stable angina pectoris    - MV CAD, hx off pump CABG x3 with LIMA to LAD, SVG to distal RCA, SVG to 1st diagonal (Vishnu, 6/2011), EF 50-55% (echo)--s/p reop CABG x3 with SVG to OM1, SVG to OM2, SVG to PDA (Vishnu)  - HTN--stable  - HLD--statin/ fenofibrate, cholestyramine  - DM type 2--followed by Dr. Mendenhall, on Jardiance/ metformin/ U-500 insulin  - Hypertriglyceridemia with hx pancreatitis--last trigly 1056 (3/2024)  - Postop ABLA, expected--watch closely  - Postop TCP, consumptive--watch closely  - Postop left ptx--s/p CT placement (Dereck, 9/28)    POD# 4.  Doing well, looks great.  DC wires today.  On asa/statin/bb.  Call out to Endo about glucoses.  Mobilize.  Re-evaluate chest tube later today.  No BM yet, add lactulose later today.    Routine care--as above  D/w pt/nsg, Dr. Mares, endo  Anticipate home at discharge in next 1-2 days    Anamika Castle, APRN  9/30/2024  10:06 EDT    Electronically signed by Osmany Mares MD at 10/01/24 0648       Rosario Mendenhall MD at 09/29/24 1212                 Daily Progress Note    Patient Care Team:  Prabhu Downing MD as PCP - General  Prabhu Downing MD as PCP - Family  Medicine  Ham Jacinto MD as Consulting Physician (Cardiology)    Chief Complaint: Follow-up type 2 diabetes    HPI: Patient seen, clinically doing well.  Off IV insulin.  Eating well.  Blood sugars are beginning to rise.    ROS:   Constitutional:  Denies fatigue, tiredness.    Respiratory: denies cough, shortness of breath.   Cardiovascular:  denies chest pain, edema   GI:  Denies abdominal pain, nausea, vomiting.         Vitals:    09/29/24 1100   BP: 103/59   Pulse: 79   Resp: 25   Temp: 98 °F (36.7 °C)   SpO2: 90%     Body mass index is 26.31 kg/m².    Physical Exam:  GEN: NAD, conversant  PSYCH: Awake and coherent      Results Review:     I reviewed the patient's new clinical results.    Glucose   Date Value Ref Range Status   09/29/2024 114 (H) 65 - 99 mg/dL Final     Sodium   Date Value Ref Range Status   09/29/2024 140 136 - 145 mmol/L Final     Potassium   Date Value Ref Range Status   09/29/2024 4.0 3.5 - 5.2 mmol/L Final     CO2   Date Value Ref Range Status   09/29/2024 32.8 (H) 22.0 - 29.0 mmol/L Final     Chloride   Date Value Ref Range Status   09/29/2024 101 98 - 107 mmol/L Final     Anion Gap   Date Value Ref Range Status   09/29/2024 6.2 5.0 - 15.0 mmol/L Final     Creatinine   Date Value Ref Range Status   09/29/2024 0.65 (L) 0.76 - 1.27 mg/dL Final   09/26/2024 0.76 0.60 - 1.30 mg/dL Final     Comment:     Serial Number: [id number]:  [id number]     BUN   Date Value Ref Range Status   09/29/2024 16 6 - 20 mg/dL Final     BUN/Creatinine Ratio   Date Value Ref Range Status   09/29/2024 24.6 7.0 - 25.0 Final     Calcium   Date Value Ref Range Status   09/29/2024 8.7 8.6 - 10.5 mg/dL Final     Albumin   Date Value Ref Range Status   09/27/2024 4.5 3.5 - 5.2 g/dL Final     Magnesium   Date Value Ref Range Status   09/27/2024 1.8 1.6 - 2.6 mg/dL Final     Phosphorus   Date Value Ref Range Status   09/27/2024 4.1 2.5 - 4.5 mg/dL Final     Lab Results   Component Value Date    HGBA1C 8.70 (H) 03/04/2024     HGBA1C 8.9 (H) 02/10/2023    HGBA1C 9.6 (H) 06/17/2022     Lab Results   Component Value Date    MICROALBUR 2.1 03/04/2024     Results from last 7 days   Lab Units 09/29/24  1137 09/29/24  0749 09/29/24  0547 09/29/24  0438 09/29/24  0250 09/29/24  0144   GLUCOSE mg/dL 241* 147* 129* 127* 128* 115*             Medication Review: Reviewed.     aspirin, 81 mg, Oral, Daily  chlorhexidine, 15 mL, Mouth/Throat, Q12H  [START ON 9/30/2024] enoxaparin, 40 mg, Subcutaneous, Daily  fenofibrate, 145 mg, Oral, Daily  furosemide, 40 mg, Oral, Daily  insulin lispro, 4-24 Units, Subcutaneous, TID With Meals  Insulin Regular Human (Conc), 60 Units, Subcutaneous, TID AC  metoprolol tartrate, 75 mg, Oral, Q12H  mupirocin, , Each Nare, BID  pantoprazole, 40 mg, Oral, Q AM  polyethylene glycol, 17 g, Oral, BID  rosuvastatin, 40 mg, Oral, Nightly  senna-docusate sodium, 2 tablet, Oral, BID              Assessment and plan:  Diabetes mellitus type 2 with hyperglycemia: Uncontrolled, will increase Humulin R U5 insulin to 70 units subcu 3 times a day before meals along with Humalog sliding scale and follow blood sugars.    CAD: Status post CABG    Hypertriglyceridemia:  on rosuvastatin and fenofibrate    Rosario Mendenhall MD. FACE                  Electronically signed by Rosario Mendenhall MD at 09/29/24 1213       Azael Johnson MD at 09/29/24 1152          CARDIOLOGY PROGRESS NOTE:    Rodney Chaney  59 y.o.  male  1965  [id number]      Referring Provider: Cardiac surgeon    Reason for follow-up: Status post coronary artery bypass surgery     Patient Care Team:  Prabhu Downing MD as PCP - General  Prabhu Downing MD as PCP - Family Medicine  Ham Jacinto MD as Consulting Physician (Cardiology)    Subjective .  Patient extubated and sitting in chair comfortably without any symptoms    Objective sitting in chair comfortably     Review of Systems   Constitutional: Negative for fever and malaise/fatigue.   HENT:  Negative for  ear pain and nosebleeds.    Eyes:  Negative for blurred vision and double vision.   Cardiovascular:  Negative for chest pain, dyspnea on exertion and palpitations.   Respiratory:  Negative for cough and shortness of breath.    Skin:  Negative for color change and rash.   Musculoskeletal:  Negative for back pain and joint pain.   Gastrointestinal:  Negative for abdominal pain, hematemesis, hematochezia, nausea and vomiting.   Genitourinary:  Negative for flank pain and hematuria.   Neurological:  Negative for focal weakness and headaches.   Psychiatric/Behavioral:  Negative for depression. The patient is not nervous/anxious.    All other systems reviewed and are negative.      Allergies: Levofloxacin    Scheduled Meds:aspirin, 81 mg, Oral, Daily  chlorhexidine, 15 mL, Mouth/Throat, Q12H  enoxaparin, 40 mg, Subcutaneous, Daily  fenofibrate, 145 mg, Oral, Daily  furosemide, 40 mg, Oral, Daily  insulin lispro, 4-24 Units, Subcutaneous, TID With Meals  Insulin Regular Human (Conc), 60 Units, Subcutaneous, TID AC  metoprolol tartrate, 75 mg, Oral, Q12H  mupirocin, , Each Nare, BID  pantoprazole, 40 mg, Oral, Q AM  polyethylene glycol, 17 g, Oral, BID  rosuvastatin, 40 mg, Oral, Nightly  senna-docusate sodium, 2 tablet, Oral, BID      Continuous Infusions:     PRN Meds:.  acetaminophen **OR** acetaminophen **OR** acetaminophen    Calcium Replacement - Follow Nurse / BPA Driven Protocol    cyclobenzaprine    dextrose    dextrose    glucagon (human recombinant)    HYDROcodone-acetaminophen    Magnesium Cardiology Dose Replacement - Follow Nurse / BPA Driven Protocol    [] Morphine **AND** naloxone    nitroglycerin    ondansetron    Phosphorus Replacement - Follow Nurse / BPA Driven Protocol    Potassium Replacement - Follow Nurse / BPA Driven Protocol        VITAL SIGNS  Vitals:    24 0900 24 0930 24 1000 24 1100   BP: 119/75  107/67 103/59   BP Location:    Right arm   Patient Position:     "Lying   Pulse: 95 75 75 79   Resp:    25   Temp:    98 °F (36.7 °C)   TempSrc:    Oral   SpO2: 97% 100% 93% 90%   Weight:       Height:           Flowsheet Rows      Flowsheet Row First Filed Value   Admission Height 182.9 cm (72.01\") Documented at 09/26/2024 1250   Admission Weight 85.3 kg (188 lb 0.8 oz) Documented at 09/26/2024 1250             TELEMETRY: Sinus rhythm    Physical Exam:  Constitutional:       Appearance: Well-developed.   Eyes:      General: No scleral icterus.     Conjunctiva/sclera: Conjunctivae normal.      Pupils: Pupils are equal, round, and reactive to light.   HENT:      Head: Normocephalic and atraumatic.   Neck:      Thyroid: No thyromegaly.      Vascular: No carotid bruit or JVD.   Pulmonary:      Effort: Pulmonary effort is normal.      Breath sounds: Normal breath sounds. No wheezing. No rales.   Cardiovascular:      Normal rate. Regular rhythm.   Pulses:     Intact distal pulses.   Edema:     Peripheral edema absent.   Abdominal:      General: Bowel sounds are normal.      Palpations: Abdomen is soft.   Musculoskeletal: Normal range of motion.      Cervical back: Normal range of motion and neck supple. Skin:     General: Skin is warm and dry.      Findings: No rash.   Neurological:      Mental Status: Alert and oriented to person, place, and time.      Comments: No focal deficits          Results Review:   I reviewed the patient's new clinical results.  Lab Results (last 24 hours)       Procedure Component Value Units Date/Time    POC Glucose 4x Daily Before Meals & at Bedtime [[id number]]  (Abnormal) Collected: 09/29/24 1137    Specimen: Blood Updated: 09/29/24 1139     Glucose 241 mg/dL      Comment: Serial Number: [id number]:  [id number]       POC Glucose Once [[id number]]  (Abnormal) Collected: 09/29/24 0547    Specimen: Blood Updated: 09/29/24 1131     Glucose 129 mg/dL      Comment: Serial Number: [id number]:  [id number]       POC Glucose 4x Daily Before Meals & at " Bedtime [[id number]]  (Abnormal) Collected: 09/29/24 0749    Specimen: Blood Updated: 09/29/24 0751     Glucose 147 mg/dL      Comment: Serial Number: [id number]:  [id number]       POC Glucose Once [[id number]]  (Abnormal) Collected: 09/29/24 0438    Specimen: Blood Updated: 09/29/24 0547     Glucose 127 mg/dL      Comment: Serial Number: [id number]:  [id number]       Basic Metabolic Panel [[id number]]  (Abnormal) Collected: 09/29/24 0426    Specimen: Blood Updated: 09/29/24 0456     Glucose 114 mg/dL      BUN 16 mg/dL      Creatinine 0.65 mg/dL      Sodium 140 mmol/L      Potassium 4.0 mmol/L      Chloride 101 mmol/L      CO2 32.8 mmol/L      Calcium 8.7 mg/dL      BUN/Creatinine Ratio 24.6     Anion Gap 6.2 mmol/L      eGFR 108.5 mL/min/1.73     Narrative:      GFR Normal >60  Chronic Kidney Disease <60  Kidney Failure <15      CBC (No Diff) [[id number]]  (Abnormal) Collected: 09/29/24 0426    Specimen: Blood Updated: 09/29/24 0446     WBC 6.48 10*3/mm3      RBC 3.50 10*6/mm3      Hemoglobin 9.7 g/dL      Hematocrit 31.2 %      MCV 89.1 fL      MCH 27.7 pg      MCHC 31.1 g/dL      RDW 15.6 %      RDW-SD 50.9 fl      MPV 10.5 fL      Platelets 74 10*3/mm3     POC Glucose Once [[id number]]  (Abnormal) Collected: 09/29/24 0250    Specimen: Blood Updated: 09/29/24 0437     Glucose 128 mg/dL      Comment: Serial Number: [id number]:  [id number]       POC Glucose Once [[id number]]  (Abnormal) Collected: 09/29/24 0019    Specimen: Blood Updated: 09/29/24 0250     Glucose 144 mg/dL      Comment: Serial Number: [id number]:  [id number]       POC Glucose Once [[id number]]  (Abnormal) Collected: 09/29/24 0144    Specimen: Blood Updated: 09/29/24 0146     Glucose 115 mg/dL      Comment: Serial Number: [id number]:  [id number]       POC Glucose Once [[id number]]  (Abnormal) Collected: 09/28/24 2314    Specimen: Blood Updated: 09/28/24 2316     Glucose 156 mg/dL      Comment: Serial Number:  [id number]:  [id number]       POC Glucose 4x Daily Before Meals & at Bedtime [[id number]]  (Abnormal) Collected: 09/28/24 2206    Specimen: Blood Updated: 09/28/24 2207     Glucose 174 mg/dL      Comment: Serial Number: [id number]:  [id number]       POC Glucose Once [[id number]]  (Abnormal) Collected: 09/28/24 2058    Specimen: Blood Updated: 09/28/24 2100     Glucose 200 mg/dL      Comment: Serial Number: [id number]:  [id number]       POC Glucose Once [[id number]]  (Abnormal) Collected: 09/28/24 1917    Specimen: Blood Updated: 09/28/24 1919     Glucose 131 mg/dL      Comment: Serial Number: [id number]:  [id number]       POC Glucose Once [[id number]]  (Abnormal) Collected: 09/28/24 1829    Specimen: Blood Updated: 09/28/24 1833     Glucose 107 mg/dL      Comment: Serial Number: [id number]:  [id number]       POC Glucose Once [[id number]]  (Abnormal) Collected: 09/28/24 1552    Specimen: Blood Updated: 09/28/24 1554     Glucose 200 mg/dL      Comment: Serial Number: [id number]:  [id number]       POC Glucose Once [[id number]]  (Abnormal) Collected: 09/28/24 1435    Specimen: Blood Updated: 09/28/24 1438     Glucose 220 mg/dL      Comment: Serial Number: [id number]:  [id number]       POC Glucose Once [[id number]]  (Abnormal) Collected: 09/28/24 1243    Specimen: Blood Updated: 09/28/24 1244     Glucose 161 mg/dL      Comment: Serial Number: [id number]:  [id number]               Imaging Results (Last 24 Hours)       Procedure Component Value Units Date/Time    XR Chest 1 View [[id number]] Resulted: 09/29/24 0926     Updated: 09/29/24 0933    XR Chest 1 View [[id number]] Collected: 09/29/24 0240     Updated: 09/29/24 0250    Narrative:      Examination: XR CHEST 1 VW-     Date of Exam: 9/28/2024 7:48 PM     Indication: post chest tube removal, mediastinal;  I25.708-Atherosclerosis of coronary artery bypass graft(s), unspecified,  with other forms of angina pectoris.     Comparison:  9/28/2024.     Technique: Single radiographic view of the chest was obtained.     Findings:  Midline chest tubes are removed. Stable left-sided chest tube. No  evidence of a left-sided pneumothorax. There is a tiny right apical  pneumothorax with approximately 3 mm pleural separation. Lung volumes  remain low and there is stable bilateral perihilar and basilar  atelectasis. Slight stable asymmetric elevation of the left  hemidiaphragm. No definite pleural effusion. There is evidence of prior  median sternotomy and CABG. Cardiac silhouette and pulmonary vasculature  appear unchanged.       Impression:         1. Trace right apical pneumothorax.  2. Stable left-sided chest tube without evidence of left-sided  pneumothorax.  3. Persistent hypoinflation of the lungs with perihilar and basilar  atelectasis.  4. Interval removal of midline chest tubes.     Electronically Signed By-Jj Bright MD On:9/29/2024 2:48 AM       XR Chest 1 View [[id number]] Collected: 09/28/24 1252     Updated: 09/28/24 1257    Narrative:      XR CHEST 1 VW    Date of Exam: 9/28/2024 12:22 PM EDT    Indication: s/p ct insertion left    Comparison: September 28, 2024 at 4:05 a.m.    Findings:  Interval placement of left chest tube. There has been evacuation of the left pneumothorax. No significant pneumothorax is visible at this time. Reexpansion of the left lung. Mild bibasilar opacities, likely atelectasis. No significant right pneumothorax   or new pleural effusion. Mediastinal drains are present. Heart size and mediastinal contour appear unchanged. Status post median sternotomy and CABG.      Impression:      Impression:  1.Interval placement of left chest tube with evacuation of left pneumothorax. No significant pneumothorax is visible at this time.  2.Mild bibasilar opacities, likely atelectasis.        Electronically Signed: Tanner Benavidez    9/28/2024 12:55 PM EDT    Workstation ID: [id number]            EKG      I personally viewed and  interpreted the patient's EKG/Telemetry data:    ECHOCARDIOGRAM:  Results for orders placed in visit on 09/26/24    Intra-Op Anesthesia EDER    Narrative  Intra-Op Anesthesia EDER    Procedure Performed: Intra-Op Anesthesia EDER  Start Time:  End Time:    Preanesthesia Checklist:  Patient identified, IV assessed, risks and benefits discussed, monitors and equipment assessed, procedure being performed at surgeon's request and anesthesia consent obtained.    General Procedure Information  EDER Placed for monitoring purposes only -- This is not a diagnostic EDER  Diagnostic Indications for Echo:  hemodynamic monitoring  Location performed:  OR  Intubated  Bite block placed  Heart visualized  Probe Insertion:  Easy  Probe Type:  Biplane  Modalities:  Color flow mapping, continuous wave Doppler and 2D only    Echocardiographic and Doppler Measurements    Ventricles    Right Ventricle:  Cavity size normal.  Hypertrophy not present.  Global function normal.  Left Ventricle:  Cavity size normal.  Global Function normal.  Ejection Fraction 55%.        Valves    Aortic Valve:  Regurgitation absent.  Leaflet motions normal.    Mitral Valve:  Annulus normal.  Regurgitation trace.  Leaflets normal.  Leaflet motions normal.    Tricuspid Valve:  Regurgitation absent.  Leaflet motions normal.      Aorta    Ascending Aorta:  Size normal.  Mobile plaque not present.  Aortic Arch:  Size normal.  Mobile plaque not present.  Descending Aorta:  Size normal.  Mobile plaque not present.      Atria    Right Atrium:  Size normal.  Left Atrium:  Size normal.                Anesthesia Information  Performed Personally  Anesthesiologist:  Benson Fisher MD      Echocardiogram Comments:  Pre CPB:         Trace MR, EF 55%  Post CPB:    no change       STRESS MYOVIEW:  Results for orders placed during the hospital encounter of 08/20/24    Stress Test With Myocardial Perfusion - One Day    Interpretation Summary    Impressions are consistent with a  high risk study.    Left ventricular ejection fraction is mildly reduced (Calculated EF = 43%).    Myocardial perfusion imaging indicates a large-sized, severe area of ischemia located in the inferior wall and lateral wall.       CARDIAC CATHETERIZATION:  Results for orders placed during the hospital encounter of 08/30/24    Cardiac Catheterization/Vascular Study       OTHER:   Denies any complaint  Denies any chest pain  Clinically Premcal stable  Maintaining sinus rhythm  Tmax is 98 pulse is 79 respirations are 24 blood pressure is 102/59 sats are 90%  Sodium is 140 potassium is 4.0 creatinine 0.6 hemoglobin is 9.7      Assessment & Plan     Coronary artery disease  Patient underwent redo coronary bypass surgery with SVG to the PDA OM and diagonal branches but have also has a LIMA from the previous surgery which is patent to the LAD  Hypertension  Patient blood pressure currently stable and is on oral medicines  Hyperlipidemia  Patient patient is started on statins  Diabetes  Patient is on insulin and will be managed by the primary care doctor.  Current medications include aspirin 81 mg p.o. once a day patient is on Tricor 145 mg p.o. once a day metoprolol 50 mg p.o. twice daily patient is on Crestor 40 mg p.o. once a day patient is currently on Lovenox for DVT prophylaxis  Postop day 3  Redo CABG with a LIMA to the LAD vein graft to the distal RCA vein graft to the first diagonal  Normal LV systolic function  Patient is currently clinically stable  Continue current medical therapy  Patient is currently clinically hemodynamically stable  Further recommendation based on patient course        Azael Johnson MD  09/29/24  11:53 EDT                Electronically signed by Azael Johnson MD at 09/29/24 1154       Consult Notes (last 72 hours)  Notes from 09/29/24 1041 through 10/02/24 1041   No notes of this type exist for this encounter.

## 2024-10-03 NOTE — PAYOR COMM NOTE
"This is discharge notification for Rodney Chaney   Reference/Auth # [id number]   Pt discharged routine to home on 10/2/24.    ABDI Bonilla, RN, CCM  Utilization Review Nurse  [facility]  Direct & confidential phone # [phone]  Fax # [phone]      Rodney Chaney (59 y.o. Male)       Date of Birth   1965    Social Security Number       Address   [address] [place] [address]    Home Phone   [phone]    MRN   [id number]       Congregation   [employer]    Marital Status                               Admission Date   9/26/24    Admission Type   Elective    Admitting Provider   Osmany Mares MD    Attending Provider       Department, Room/Bed   [facility] CARDIOVASCULAR CARE UNIT, 3115/1       Discharge Date   10/2/2024    Discharge Disposition   Home or Self Care    Discharge Destination   Home                              Attending Provider: (none)   Allergies: Levofloxacin    Isolation: None   Infection: None   Code Status: Prior    Ht: 182.9 cm (72.01\")   Wt: 84.8 kg (187 lb)    Admission Cmt: None   Principal Problem: Coronary artery disease [I25.10]                   Active Insurance as of 9/26/2024       Primary Coverage       Payor Plan Insurance Group Employer/Plan Group    [facility] [employer] [facility] [id number]       Payor Plan Address Payor Plan Phone Number Payor Plan Fax Number Effective Dates    [address] [phone]  1/1/2024 - None Entered    [place] [address]         Subscriber Name Subscriber Birth Date Member ID       RODNEY CHANEY 1965 [id number]                     Emergency Contacts        (Rel.) Home Phone Work Phone Mobile Phone    LEOLA CHANEY (Spouse) -- -- [phone]                 Discharge Summary        Gabrielle Adams PA-C at 10/02/24 1517          Date of Admission: 9/26/2024  Date of Discharge:  10/2/2024    Discharge Diagnosis:   - MV CAD, hx off pump CABG x3 with LIMA to LAD, SVG " to distal RCA, SVG to 1st diagonal (Vishnu, 6/2011), EF 50-55% (echo)--s/p reop CABG x3 with SVG to OM1, SVG to OM2, SVG to PDA (Vishnu)  - HTN--stable  - HLD--statin/ fenofibrate, cholestyramine  - DM type 2--followed by Dr. Mendenhall, on Jardiance/ metformin/ U-500 insulin  - Hypertriglyceridemia with hx pancreatitis--last trigly 1056 (3/2024)  - Postop ABLA, expected--watch closely  - Postop TCP, consumptive--watch closely  - [facility] ptx[facility]    Presenting Problem/History of Present Illness  Coronary artery disease of bypass graft of native heart with stable angina pectoris [I25.708]     Hospital Course  Patient is a 59 y.o. male who presented to our facility for elective open heart surgery. On 9/26, patient underwent Redo sternotomy with extensive lysis of adhesions, CABG x 3 with reverse Individual saphenous vein graft to the obtuse marginal 1 and 2 and to the PDA, EVH of the left leg with Dr. Mares (see op-note for more detail). Operation went well and after, patient was transferred to [facility] in stable condition where he was later extubated. POD1, swan and arterial line removed, beta blocker added, patient diuresed. Endocrinology was consulted for DM management. POD2, toro discontinued, lopressor increased, patient diuresed. Patient developed left sided PTX and left pleural chest tube was placed. POD3, lopressor increased to home dose, patient placed on PO diuresis. POD4, AV wires removed, chest tube on waterseal. POD5, patient chest tube remains on waterseal and without air leak. Chest tube was clamped and CXR completed 2 hours after. CXR look good and without PTX, therefor this was removed. Beta blocker was increased due to tachycardia and patient tolerated this. Overnight oximetry ordered that night and patient had 14 minutes of desaturation and will require nocturnal oxygen at discharge. POD6, walking oximetry ordered and patient passed. Patient deemed ready for discharge  home with family assist. Patient educated on sternal precautions and signs of infection. Endocrinology recommend continuing home Jardiance and metformin along with 80units U500 TID. Patient to follow up in post-op clinic in 2-3 weeks with appointment listed below.        Procedures Performed  Procedure(s):  CORONARY ARTERY BYPASS GRAFTx 3 grafts  REOP WITH TRANSESOPHAGEAL ECHOCARDIOGRAM  10/02 1439 Walking Oximetry    Consults:   Consults       Date and Time Order Name Status Description    9/26/2024  4:15 PM Inpatient Endocrinology Consult      9/26/2024 11:20 AM Inpatient Cardiology Consult              Pertinent Test Results:    Lab Results   Component Value Date    WBC 4.96 10/02/2024    HGB 10.6 (L) 10/02/2024    HCT 33.3 (L) 10/02/2024    MCV 86.5 10/02/2024     10/02/2024      Lab Results   Component Value Date    GLUCOSE 77 10/02/2024    CALCIUM 9.1 10/02/2024     10/02/2024    K 3.8 10/02/2024    CO2 33.6 (H) 10/02/2024    CL 98 10/02/2024    BUN 18 10/02/2024    CREATININE 0.76 10/02/2024    EGFRIFNONA 76 12/30/2021    BCR 23.7 10/02/2024    ANIONGAP 6.4 10/02/2024     Lab Results   Component Value Date    INR 1.41 (H) 09/27/2024    PROTIME 15.0 (H) 09/27/2024         Condition on Discharge: Stable     Vital Signs  Temp:  [97.5 °F (36.4 °C)-98 °F (36.7 °C)] 98 °F (36.7 °C)  Heart Rate:  [] 82  Resp:  [14-23] 18  BP: ()/(39-77) 123/66      Discharge Disposition  Home or Self Care    Discharge Medications     Discharge Medications        New Medications        Instructions Start Date   acetaminophen 325 MG tablet  Commonly known as: TYLENOL   650 mg, Oral, Every 4 Hours PRN      furosemide 40 MG tablet  Commonly known as: LASIX   40 mg, Oral, Daily   Start Date: October 3, 2024     HYDROcodone-acetaminophen 5-325 MG per tablet  Commonly known as: NORCO   1 tablet, Oral, Every 6 Hours PRN      potassium chloride 20 MEQ CR tablet  Commonly known as: KLOR-CON M20   20 mEq, Oral, Daily              Changes to Medications        Instructions Start Date   HumuLIN R U-500 KwikPen 500 UNIT/ML solution pen-injector CONCENTRATED injection  Generic drug: Insulin Regular Human (Conc)  What changed:   how much to take  when to take this  Another medication with the same name was removed. Continue taking this medication, and follow the directions you see here.   80 Units, Subcutaneous, 3 Times Daily Before Meals, With Meals      metoprolol tartrate 100 MG tablet  Commonly known as: LOPRESSOR  What changed:   medication strength  how much to take  when to take this   100 mg, Oral, Every 12 Hours Scheduled             Continue These Medications        Instructions Start Date   Apple Cider Vinegar 188 MG capsule   1 capsule, Oral, Daily      aspirin 81 MG tablet   Take 1 tablet by mouth Daily.      calcium carbonate (oyster shell) 500 MG tablet tablet   500 mg, Oral, Daily      cholestyramine light 4 GM/DOSE powder  Commonly known as: PREVALITE   4 g, Oral, 2 Times Daily With Meals      Dexcom G7 Sensor misc   USE 1 SENSOR EVERY 10 DAYS      fenofibrate 160 MG tablet   160 mg, Oral, Daily      Jardiance 25 MG tablet tablet  Generic drug: empagliflozin   Take 1 tablet by mouth once daily      metFORMIN 1000 MG tablet  Commonly known as: GLUCOPHAGE   1,000 mg, Oral, 2 Times Daily With Meals      nitroglycerin 0.4 MG SL tablet  Commonly known as: NITROSTAT   1 under the tongue as needed for angina, may repeat q5mins for up three doses      OMEGA-3 2100 PO   1 capsule, Oral, Daily      RED YEAST RICE EXTRACT PO   1 capsule, Oral, Daily      rosuvastatin 40 MG tablet  Commonly known as: CRESTOR   Take 1 tablet by mouth once daily             Stop These Medications      mupirocin 2 % ointment  Commonly known as: BACTROBAN              Discharge Diet: Heart healthy     Activity at Discharge:   1. No driving for 2 weeks and off narcotic pain medications.  2. Shower daily. Clean incisions with warm water and antibacterial  soap only. Do not put any lotion or ointments on incisions.  3. Ambulate for 10 minutes at least 3 times a day.  4. No heavy lifting > 10lbs until seen in office.   5. Take all medications as prescribed.      Follow-up Appointments  Future Appointments   Date Time Provider Department Center   10/17/2024  1:30 PM Anamika Castle APRN [facility]   10/28/2024  1:50 PM Ham Jacinto MD [facility]   4/28/2025  9:15 AM Rosario Mendenhall MD [facility]     Additional Instructions for the Follow-ups that You Need to Schedule       Call MD With Problems / Concerns   As directed      Instructions:  Call office at [phone] for any drainage, increased redness, or fever over 100.5    Order Comments: Instructions:  Call office at [phone] for any drainage, increased redness, or fever over 100.5         Discharge Follow-up with PCP   As directed       Currently Documented PCP:    Prabhu Downing MD    PCP Phone Number:    [phone]     Follow Up Details: in 1 week        Discharge Follow-up with Specialty: Cardiac surgery; 2 Weeks   As directed      Specialty: Cardiac surgery   Follow Up: 2 Weeks   Follow Up Details: Appt. on 10/17        Discharge Follow-up with Specified Provider: Cardiologist; 1 Month   As directed      To: Cardiologist   Follow Up: 1 Month   Follow Up Details: call for appointment, bring all medication bottles to appointment        Discharge Follow-up with Specified Provider:    As directed      To:    Follow Up Details: 4-6 weeks, bring all current medications to appointment                Test Results Pending at Discharge       Gabrielle Adams PA-C  10/02/24  15:40 EDT           Electronically signed by Gabrielle Adams PA-C at 10/02/24 4536

## 2024-10-03 NOTE — OUTREACH NOTE
Prep Survey      Flowsheet Row Responses   [facility] facility patient discharged from? [facility]   Is LACE score < 7 ? No   Eligibility Readm Mgmt   Discharge diagnosis Coronary artery disease- CABGX3   Does the patient have one of the following disease processes/diagnoses(primary or secondary)? Other   Is there a DME ordered? Yes   What DME was ordered? Nocturnal Oxygen   Prep survey completed? Yes            Precious KAUR - Registered Nurse

## 2024-10-08 ENCOUNTER — READMISSION MANAGEMENT (OUTPATIENT)
Dept: CALL CENTER | Facility: HOSPITAL | Age: 59
End: 2024-10-08
Payer: COMMERCIAL

## 2024-10-08 NOTE — OUTREACH NOTE
Medical Week 1 Survey      Flowsheet Row Responses   [facility] facility patient discharged from? [facility]   Does the patient have one of the following disease processes/diagnoses(primary or secondary)? Other   Week 1 attempt successful? No   Unsuccessful attempts Attempt 1            PARI MCKAY - Registered Nurse

## 2024-10-11 ENCOUNTER — READMISSION MANAGEMENT (OUTPATIENT)
Dept: CALL CENTER | Facility: HOSPITAL | Age: 59
End: 2024-10-11
Payer: COMMERCIAL

## 2024-10-11 NOTE — OUTREACH NOTE
Medical Week 1 Survey      Flowsheet Row Responses   [facility], [place], operated by [facility] patient discharged from? [facility]   Does the patient have one of the following disease processes/diagnoses(primary or secondary)? Other   Week 1 attempt successful? Yes   Call start time 1536   Call end time 1539   Discharge diagnosis Coronary artery disease- CABGX3   Meds reviewed with patient/caregiver? Yes   Is the patient having any side effects they believe may be caused by any medication additions or changes? No   Does the patient have all medications ordered at discharge? Yes   Is the patient taking all medications as directed (includes completed medication regime)? Yes   Does the patient have a primary care provider?  Yes   Does the patient have an appointment with their PCP within 7 days of discharge? Greater than 7 days   What is preventing the patient from scheduling follow up appointments within 7 days of discharge? Earlier appointment not available   Has the patient kept scheduled appointments due by today? N/A   Has home health visited the patient within 72 hours of discharge? N/A   Psychosocial issues? No   Did the patient receive a copy of their discharge instructions? Yes   Nursing interventions Reviewed instructions with patient   What is the patient's perception of their health status since discharge? Improving   Is the patient/caregiver able to teach back signs and symptoms related to disease process for when to call PCP? Yes   Is the patient/caregiver able to teach back signs and symptoms related to disease process for when to call 911? Yes   Is the patient/caregiver able to teach back the hierarchy of who to call/visit for symptoms/problems? PCP, Specialist, Home health nurse, Urgent Care, ED, 911 Yes   If the patient is a current smoker, are they able to teach back resources for cessation? Not a smoker   Week 1 call completed? Yes   Call end time 1539            Danay Puckett Nurse   40

## 2024-10-12 DIAGNOSIS — E11.65 TYPE 2 DIABETES MELLITUS WITH HYPERGLYCEMIA, WITHOUT LONG-TERM CURRENT USE OF INSULIN: Primary | ICD-10-CM

## 2024-10-14 ENCOUNTER — TELEPHONE (OUTPATIENT)
Dept: CARDIAC REHAB | Facility: HOSPITAL | Age: 59
End: 2024-10-14
Payer: COMMERCIAL

## 2024-10-17 ENCOUNTER — OFFICE VISIT (OUTPATIENT)
Dept: CARDIAC SURGERY | Facility: CLINIC | Age: 59
End: 2024-10-17
Payer: COMMERCIAL

## 2024-10-17 VITALS
OXYGEN SATURATION: 99 % | TEMPERATURE: 98 F | RESPIRATION RATE: 18 BRPM | DIASTOLIC BLOOD PRESSURE: 83 MMHG | HEART RATE: 76 BPM | BODY MASS INDEX: 25.13 KG/M2 | SYSTOLIC BLOOD PRESSURE: 120 MMHG | HEIGHT: 73 IN | WEIGHT: 189.6 LBS

## 2024-10-17 DIAGNOSIS — J95.89 ACUTE RESPIRATORY INSUFFICIENCY, POSTOPERATIVE: Primary | ICD-10-CM

## 2024-10-17 PROCEDURE — 99024 POSTOP FOLLOW-UP VISIT: CPT | Performed by: NURSE PRACTITIONER

## 2024-10-17 NOTE — PROGRESS NOTES
"CARDIOVASCULAR SURGERY FOLLOW-UP PROGRESS NOTE  Chief Complaint: Post-op Follow Up        HPI:   Dear Prabhu Alcantar MD and colleagues:    It was nice to see Rodney Chaney in follow up today after cardiac surgery.  As you know, he is a 59 y.o. male with recurrent MV CAD--s/p CABG, HTN, HLD, DM type II with u-500 insulin use, and hypertriglyceridemia with hx pancreatitis who underwent reop CABG x3 with LIMA to LAD, SVG to distal RCA, SVG to 1st diagonal at [facility] by Dr. Mares on 9/26/2024.  He did develop a postop left pneumothorax that required chest tube placement by Dr. Harden on 9/28/2024 with resolution of his ptx.  Other than his ptx, he did well after surgery.  He did go home with oxygen with sleep periods after failing an overnight oximetry.  Endocrinology did follow Mr. Chaney while in the hospital d/t his u-500 he reports until today his blood sugars had been on the low sideinsulin needs.  His CGM while in the office read at 237.  He reports that he has a follow-up with Endo on 10/28.  He comes in today complaining of nothing but is asking if his oxygen can be discontinued.  His activity level has been good.  He reports walking in his neighborhood daily.  He is asking when he can return to riding his stationary bike.  He has not seen cardiology, but has an appt on 10/28.  He has not started cardiac rehab.  He is alone for his appt.    Physical Exam:         /83 (BP Location: Right arm, Patient Position: Sitting, Cuff Size: Adult)   Pulse 76   Temp 98 °F (36.7 °C)   Resp 18   Ht 185.4 cm (73\")   Wt 86 kg (189 lb 9.6 oz)   SpO2 99%   BMI 25.01 kg/m²   Heart:  regular rate and rhythm  Lungs:  clear to auscultation bilaterally  Extremities:  no edema  Incision(s):  mid chest healing well, left leg healing well, sternum stable    Assessment/Plan:     S/P reop CABG x3 with LIMA to LAD, SVG to distal RCA, SVG to 1st diagonal . Overall, he is doing well.  We discussed that I would like him " to get into cardiac rehab.  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  His surgical incisions are healing well.  I ordered a nocturnal oximetry thru [employer] to see if his oxygen can be discontinued.  He brought his work release papers with him for us to fill out.  We will see him as needed.    Post-op left pneumothorax--s/p chest tube insertion, resolved    Keep incisions clean and dry  OK to drive if not taking narcotic pain medicine  OK to begin cardiac rehab  Follow-up as scheduled with cardiology  Follow-up with CT surgery prn    Continue lifting restriction of 10 lbs until 6 weeks and 50 lbs until 12 weeks from the date of surgery, no excessive jarring motions or twisting motions until 12 weeks from the date of surgery.    Thank you for allowing me to participate in the care of your patient.    Regards,  Anamika Castle, APRN

## 2024-10-21 ENCOUNTER — READMISSION MANAGEMENT (OUTPATIENT)
Dept: CALL CENTER | Facility: HOSPITAL | Age: 59
End: 2024-10-21
Payer: COMMERCIAL

## 2024-10-21 NOTE — OUTREACH NOTE
Medical Week 2 Survey      Flowsheet Row Responses   [facility], [place] patient discharged from? [facility]   Does the patient have one of the following disease processes/diagnoses(primary or secondary)? Other   Week 2 attempt successful? Yes   Call start time 1328   Call end time 1338   Meds reviewed with patient/caregiver? Yes   Is the patient having any side effects they believe may be caused by any medication additions or changes? No   Does the patient have all medications ordered at discharge? Yes   Is the patient taking all medications as directed (includes completed medication regime)? Yes   Comments regarding appointments Cardiology appt 10/28/24, PCP appt 10/30/24, has seen CT surgeon.   Does the patient have a primary care provider?  Yes   Does the patient have an appointment with their PCP within 7 days of discharge? Yes   Has the patient kept scheduled appointments due by today? Yes   Has home health visited the patient within 72 hours of discharge? N/A   Has all DME been delivered? Yes   DME comments States nocturnal O2 interferes with his sleep, and will be doing a sleep study. O2 sats running at 96% on RA.   Psychosocial issues? No   Did the patient receive a copy of their discharge instructions? Yes   Nursing interventions Reviewed instructions with patient   What is the patient's perception of their health status since discharge? Improving   Is the patient/caregiver able to teach back signs and symptoms related to disease process for when to call PCP? Yes   Is the patient/caregiver able to teach back signs and symptoms related to disease process for when to call 911? Yes   Is the patient/caregiver able to teach back the hierarchy of who to call/visit for symptoms/problems? PCP, Specialist, Home health nurse, Urgent Care, ED, 911 Yes   If the patient is a current smoker, are they able to teach back resources for cessation? Not a smoker   Additional teach back comments Encouraged to monitor BP/HR daily at  "home bid and keep record for appts.   Week 2 Call Completed? Yes   Graduated Yes   Is the patient interested in additional calls from an ambulatory ? No   Would this patient benefit from a Referral to [facility] Social Work? No   Graduated/Revoked comments States is improving, and keeping all f/u appts. Denies any need for further f/u calls.   Wrap up additional comments Patient states is improving. Denies any s/s of infection or any concerns today. States vital signs have been \"good\". Keeping all f/u appts, and will discuss cardiac rehab with cardiologist at appt. Denies any needs.   Call end time 1338            Lisa KAUR - Registered Nurse  "

## 2024-10-22 ENCOUNTER — TELEPHONE (OUTPATIENT)
Dept: CARDIAC SURGERY | Facility: CLINIC | Age: 59
End: 2024-10-22
Payer: COMMERCIAL

## 2024-10-23 ENCOUNTER — TELEPHONE (OUTPATIENT)
Dept: CARDIAC SURGERY | Facility: CLINIC | Age: 59
End: 2024-10-23
Payer: COMMERCIAL

## 2024-10-23 NOTE — TELEPHONE ENCOUNTER
Caller: Rodney Chaney    Relationship: Self    Best call back number: [phone] (Mobile)     What is the best time to reach you: ANYTIME AFTER 1PM 10/23 OR ALL DAY 10/24    OR ANYTIME DR SOLIS CAN TALK JUST LET PT KNOW DATE/TIME    Who are you requesting to speak with (clinical staff, provider,  specific staff member): DR SOLIS    Do you know the name of the person who called: N/A    What was the call regarding: PT SAID HE SPOKE WITH MALIA AND WAS VERY APPRECIATIVE OF HER HARD WORK IN GETTING HIS RETURN TO WORK DATE SET. HOWEVER, PT WOULD LIKE TO SPEAK WITH DR SOLIS DIRECTLY BECAUSE HIS RETURN TO WORK DATE WAS SET FOR 12/20/24 BUT HIS FMLA RUNS OUT ON 12/1/24 AND HE IS IN DANGER OF LOSING HIS JOB.     PLEASE CALL WITH OPTIONS FOR PT TO DISCUSS WITH DR SOLIS.     Is it okay if the provider responds through [employer]: NO PLEASE CALL

## 2024-10-24 NOTE — TELEPHONE ENCOUNTER
Spoke with HannahShiv, discussed return to work date. He states he would be returning on 12/3/2024. He reports that he does not lift more than 50lbs.   Work note completed and emailed to patient per patient request.

## 2024-10-28 ENCOUNTER — OFFICE VISIT (OUTPATIENT)
Dept: CARDIOLOGY | Facility: CLINIC | Age: 59
End: 2024-10-28
Payer: COMMERCIAL

## 2024-10-28 VITALS
WEIGHT: 192 LBS | HEART RATE: 86 BPM | BODY MASS INDEX: 26.01 KG/M2 | OXYGEN SATURATION: 98 % | SYSTOLIC BLOOD PRESSURE: 108 MMHG | DIASTOLIC BLOOD PRESSURE: 64 MMHG | HEIGHT: 72 IN

## 2024-10-28 DIAGNOSIS — E10.9 TYPE 1 DIABETES MELLITUS WITHOUT COMPLICATION: ICD-10-CM

## 2024-10-28 DIAGNOSIS — I25.708 CORONARY ARTERY DISEASE OF BYPASS GRAFT OF NATIVE HEART WITH STABLE ANGINA PECTORIS: Primary | ICD-10-CM

## 2024-10-28 DIAGNOSIS — I10 PRIMARY HYPERTENSION: ICD-10-CM

## 2024-10-28 DIAGNOSIS — E78.00 PURE HYPERCHOLESTEROLEMIA: ICD-10-CM

## 2024-10-28 RX ORDER — METOPROLOL TARTRATE 100 MG/1
100 TABLET ORAL EVERY 12 HOURS SCHEDULED
Qty: 180 TABLET | Refills: 0 | Status: SHIPPED | OUTPATIENT
Start: 2024-10-28 | End: 2025-01-26

## 2024-10-28 NOTE — PROGRESS NOTES
Subjective:     Encounter Date:10/28/2024      Patient ID: Rodney Chaney is a 59 y.o. male.    Chief Complaint:  History of Present Illness 59-year-old white male with history of coronary disease status post coronary artery bypass surgery history of diabetes hypertension hyperlipidemia presents to my office for a follow-up.  Patient is currently stable without any symptoms of chest pain or shortness of breath at rest or exertion.  No complaint of any PND orthopnea.  No palpitation dizziness syncope or swelling of the feet.  Patient is taking all the medicines regularly.  Patient does not smoke.    The following portions of the patient's history were reviewed and updated as appropriate: allergies, current medications, past family history, past medical history, past social history, past surgical history, and problem list.  Past Medical History:   Diagnosis Date    CAD (coronary artery disease)     Hyperlipidemia     Hypertension     Type 2 diabetes mellitus      Past Surgical History:   Procedure Laterality Date    APPENDECTOMY      CARDIAC CATHETERIZATION N/A 8/30/2024    Procedure: Left Heart Cath with angiogram;  Surgeon: Ham Jacinto MD;  Location: [facility] CATH INVASIVE LOCATION;  Service: Cardiovascular;  Laterality: N/A;    CARDIAC CATHETERIZATION Right 8/30/2024    Procedure: Coronary angiography;  Surgeon: Ham Jacinto MD;  Location: [facility] CATH INVASIVE LOCATION;  Service: Cardiovascular;  Laterality: Right;    CARDIAC CATHETERIZATION N/A 8/30/2024    Procedure: Left ventriculography;  Surgeon: Ham Jacinto MD;  Location: [facility] CATH INVASIVE LOCATION;  Service: Cardiovascular;  Laterality: N/A;    CARDIAC CATHETERIZATION  8/30/2024    Procedure: Saphenous Vein Graft;  Surgeon: Ham Jacinto MD;  Location: [facility] CATH INVASIVE LOCATION;  Service: Cardiovascular;;    CHOLECYSTECTOMY      CORONARY ARTERY BYPASS GRAFT      2010    CORONARY ARTERY BYPASS GRAFT N/A 9/26/2024    Procedure: CORONARY  "ARTERY BYPASS GRAFTx 3 grafts  REOP WITH TRANSESOPHAGEAL ECHOCARDIOGRAM;  Surgeon: Osmany Mares MD;  Location: [facility];  Service: Cardiothoracic;  Laterality: N/A;  reop CABG x 3 vein grafts     /64   Pulse 86   Ht 182.9 cm (72\")   Wt 87.1 kg (192 lb)   SpO2 98%   BMI 26.04 kg/m²   Family History   Problem Relation Age of Onset    Heart attack Mother     Alcohol abuse Father        Current Outpatient Medications:     acetaminophen (TYLENOL) 325 MG tablet, Take 2 tablets by mouth Every 4 (Four) Hours As Needed for Mild Pain., Disp: , Rfl:     Apple Cider Vinegar 188 MG capsule, Take 1 capsule by mouth Daily., Disp: , Rfl:     aspirin 81 MG tablet, Take 1 tablet by mouth Daily., Disp: , Rfl:     calcium carbonate, oyster shell, 500 MG tablet tablet, Take 1 tablet by mouth Daily., Disp: , Rfl:     cholestyramine light (PREVALITE) 4 GM/DOSE powder, Take 1 packet by mouth 2 (Two) Times a Day With Meals., Disp: 180 g, Rfl: 6    Continuous Glucose Sensor (Dexcom G7 Sensor) misc, USE 1 SENSOR EVERY 10 DAYS, Disp: , Rfl:     empagliflozin (Jardiance) 25 MG tablet tablet, Take 1 tablet by mouth once daily, Disp: 90 tablet, Rfl: 3    fenofibrate 160 MG tablet, TAKE 1 TABLET BY MOUTH DAILY, Disp: 90 tablet, Rfl: 4    furosemide (LASIX) 40 MG tablet, Take 1 tablet by mouth Daily for 30 days., Disp: 30 tablet, Rfl: 0    HYDROcodone-acetaminophen (NORCO) 5-325 MG per tablet, Take 1 tablet by mouth Every 6 (Six) Hours As Needed for Moderate Pain., Disp: 25 tablet, Rfl: 0    Insulin Regular Human, Conc, (HumuLIN R U-500 KwikPen) 500 UNIT/ML solution pen-injector CONCENTRATED injection, Inject 80 Units under the skin into the appropriate area as directed 3 (Three) Times a Day Before Meals. With Meals, Disp: , Rfl:     metFORMIN (GLUCOPHAGE) 1000 MG tablet, TAKE 1 TABLET BY MOUTH TWICE A DAY WITH A MEAL, Disp: 60 tablet, Rfl: 3    metoprolol tartrate (LOPRESSOR) 100 MG tablet, Take 1 tablet by mouth Every 12 " (Twelve) Hours for 90 days., Disp: 180 tablet, Rfl: 0    nitroglycerin (NITROSTAT) 0.4 MG SL tablet, 1 under the tongue as needed for angina, may repeat q5mins for up three doses, Disp: 25 tablet, Rfl: 1    Omega-3 Fatty Acids (OMEGA-3 2100 PO), Take 1 capsule by mouth Daily., Disp: , Rfl:     potassium chloride (KLOR-CON M20) 20 MEQ CR tablet, Take 1 tablet by mouth Daily for 30 days., Disp: 30 tablet, Rfl: 0    RED YEAST RICE EXTRACT PO, Take 1 capsule by mouth Daily., Disp: , Rfl:     rosuvastatin (CRESTOR) 40 MG tablet, Take 1 tablet by mouth once daily, Disp: 90 tablet, Rfl: 3  Allergies   Allergen Reactions    Levofloxacin Unknown (See Comments)     Social History     Socioeconomic History    Marital status:      Spouse name: hari    Number of children: 0    Years of education: 19   Tobacco Use    Smoking status: Never    Smokeless tobacco: Never   Vaping Use    Vaping status: Never Used   Substance and Sexual Activity    Alcohol use: No    Drug use: Defer    Sexual activity: Defer     Review of Systems   Constitutional: Negative for malaise/fatigue.   Cardiovascular:  Negative for chest pain, dyspnea on exertion, leg swelling and palpitations.   Respiratory:  Negative for cough and shortness of breath.    Gastrointestinal:  Negative for abdominal pain, nausea and vomiting.   Neurological:  Negative for dizziness, focal weakness, headaches, light-headedness and numbness.   All other systems reviewed and are negative.             Objective:     Constitutional:       Appearance: Well-developed.   Eyes:      General: No scleral icterus.     Conjunctiva/sclera: Conjunctivae normal.   HENT:      Head: Normocephalic and atraumatic.   Neck:      Vascular: No carotid bruit or JVD.   Pulmonary:      Effort: Pulmonary effort is normal.      Breath sounds: Normal breath sounds. No wheezing. No rales.   Cardiovascular:      Normal rate. Regular rhythm.   Pulses:     Intact distal pulses.   Abdominal:       General: Bowel sounds are normal.      Palpations: Abdomen is soft.   Musculoskeletal:      Cervical back: Normal range of motion and neck supple. Skin:     General: Skin is warm and dry.      Findings: No rash.   Neurological:      Mental Status: Alert.       Procedures    Lab Review:         MDM    #1 coronary artery disease  Patient underwent reoperative coronary artery bypass surgery with a LIMA to LAD and SVG to the distal RCA and the marginal branch and is currently stable on medications with normal V function  2.  Hypertension  Patient's blood pressure currently stable on medications including metoprolol  3.  Hyperlipidemia  Patient is on Crestor and the lipid levels are well within normal limits  4 diabetes  Patient is on oral medicines and followed by primary care doctor    Patient's previous medical records, labs, and EKG were reviewed and discussed with the patient at today's visit.

## 2024-11-01 DIAGNOSIS — Z95.1 STATUS POST CORONARY ARTERY BYPASS GRAFT: Primary | ICD-10-CM

## 2024-11-06 ENCOUNTER — TELEPHONE (OUTPATIENT)
Dept: CARDIAC REHAB | Facility: HOSPITAL | Age: 59
End: 2024-11-06
Payer: COMMERCIAL

## 2024-11-07 ENCOUNTER — OFFICE VISIT (OUTPATIENT)
Dept: CARDIAC REHAB | Facility: HOSPITAL | Age: 59
End: 2024-11-07
Payer: COMMERCIAL

## 2024-11-07 DIAGNOSIS — Z95.1 STATUS POST CORONARY ARTERY BYPASS GRAFT: ICD-10-CM

## 2024-11-07 PROCEDURE — 93798 PHYS/QHP OP CAR RHAB W/ECG: CPT

## 2024-11-08 ENCOUNTER — TREATMENT (OUTPATIENT)
Dept: CARDIAC REHAB | Facility: HOSPITAL | Age: 59
End: 2024-11-08
Payer: COMMERCIAL

## 2024-11-08 DIAGNOSIS — Z95.1 STATUS POST CORONARY ARTERY BYPASS GRAFT: Primary | ICD-10-CM

## 2024-11-08 PROCEDURE — 93798 PHYS/QHP OP CAR RHAB W/ECG: CPT

## 2024-11-11 ENCOUNTER — TREATMENT (OUTPATIENT)
Dept: CARDIAC REHAB | Facility: HOSPITAL | Age: 59
End: 2024-11-11
Payer: COMMERCIAL

## 2024-11-11 DIAGNOSIS — Z95.1 STATUS POST CORONARY ARTERY BYPASS GRAFT: Primary | ICD-10-CM

## 2024-11-11 PROCEDURE — 93798 PHYS/QHP OP CAR RHAB W/ECG: CPT

## 2024-11-13 ENCOUNTER — TREATMENT (OUTPATIENT)
Dept: CARDIAC REHAB | Facility: HOSPITAL | Age: 59
End: 2024-11-13
Payer: COMMERCIAL

## 2024-11-13 DIAGNOSIS — Z95.1 STATUS POST CORONARY ARTERY BYPASS GRAFT: Primary | ICD-10-CM

## 2024-11-13 PROCEDURE — 93798 PHYS/QHP OP CAR RHAB W/ECG: CPT

## 2024-11-15 ENCOUNTER — TREATMENT (OUTPATIENT)
Dept: CARDIAC REHAB | Facility: HOSPITAL | Age: 59
End: 2024-11-15
Payer: COMMERCIAL

## 2024-11-15 DIAGNOSIS — Z95.1 STATUS POST CORONARY ARTERY BYPASS GRAFT: Primary | ICD-10-CM

## 2024-11-15 PROCEDURE — 93798 PHYS/QHP OP CAR RHAB W/ECG: CPT

## 2024-11-18 ENCOUNTER — TREATMENT (OUTPATIENT)
Dept: CARDIAC REHAB | Facility: HOSPITAL | Age: 59
End: 2024-11-18
Payer: COMMERCIAL

## 2024-11-18 DIAGNOSIS — Z95.1 STATUS POST CORONARY ARTERY BYPASS GRAFT: Primary | ICD-10-CM

## 2024-11-18 PROCEDURE — 93798 PHYS/QHP OP CAR RHAB W/ECG: CPT

## 2024-11-20 ENCOUNTER — TREATMENT (OUTPATIENT)
Dept: CARDIAC REHAB | Facility: HOSPITAL | Age: 59
End: 2024-11-20
Payer: COMMERCIAL

## 2024-11-20 DIAGNOSIS — Z95.1 STATUS POST CORONARY ARTERY BYPASS GRAFT: Primary | ICD-10-CM

## 2024-11-20 PROCEDURE — 93798 PHYS/QHP OP CAR RHAB W/ECG: CPT

## 2024-11-22 ENCOUNTER — TREATMENT (OUTPATIENT)
Dept: CARDIAC REHAB | Facility: HOSPITAL | Age: 59
End: 2024-11-22
Payer: COMMERCIAL

## 2024-11-22 DIAGNOSIS — Z95.1 STATUS POST CORONARY ARTERY BYPASS GRAFT: Primary | ICD-10-CM

## 2024-11-22 PROCEDURE — 93798 PHYS/QHP OP CAR RHAB W/ECG: CPT

## 2024-11-25 ENCOUNTER — TREATMENT (OUTPATIENT)
Dept: CARDIAC REHAB | Facility: HOSPITAL | Age: 59
End: 2024-11-25
Payer: COMMERCIAL

## 2024-11-25 DIAGNOSIS — Z95.1 STATUS POST CORONARY ARTERY BYPASS GRAFT: Primary | ICD-10-CM

## 2024-11-25 PROCEDURE — 93798 PHYS/QHP OP CAR RHAB W/ECG: CPT

## 2024-11-27 ENCOUNTER — APPOINTMENT (OUTPATIENT)
Dept: CARDIAC REHAB | Facility: HOSPITAL | Age: 59
End: 2024-11-27
Payer: COMMERCIAL

## 2024-11-27 ENCOUNTER — TREATMENT (OUTPATIENT)
Dept: CARDIAC REHAB | Facility: HOSPITAL | Age: 59
End: 2024-11-27
Payer: COMMERCIAL

## 2024-11-27 DIAGNOSIS — Z95.1 STATUS POST CORONARY ARTERY BYPASS GRAFT: Primary | ICD-10-CM

## 2024-11-27 PROCEDURE — 93798 PHYS/QHP OP CAR RHAB W/ECG: CPT

## 2024-11-29 ENCOUNTER — TREATMENT (OUTPATIENT)
Dept: CARDIAC REHAB | Facility: HOSPITAL | Age: 59
End: 2024-11-29
Payer: COMMERCIAL

## 2024-11-29 ENCOUNTER — APPOINTMENT (OUTPATIENT)
Dept: CARDIAC REHAB | Facility: HOSPITAL | Age: 59
End: 2024-11-29
Payer: COMMERCIAL

## 2024-11-29 DIAGNOSIS — Z95.1 STATUS POST CORONARY ARTERY BYPASS GRAFT: Primary | ICD-10-CM

## 2024-11-29 PROCEDURE — 93798 PHYS/QHP OP CAR RHAB W/ECG: CPT

## 2024-12-02 ENCOUNTER — TREATMENT (OUTPATIENT)
Dept: CARDIAC REHAB | Facility: HOSPITAL | Age: 59
End: 2024-12-02
Payer: COMMERCIAL

## 2024-12-02 DIAGNOSIS — Z95.1 STATUS POST CORONARY ARTERY BYPASS GRAFT: Primary | ICD-10-CM

## 2024-12-02 PROCEDURE — 93798 PHYS/QHP OP CAR RHAB W/ECG: CPT

## 2024-12-09 ENCOUNTER — TREATMENT (OUTPATIENT)
Dept: CARDIAC REHAB | Facility: HOSPITAL | Age: 59
End: 2024-12-09
Payer: COMMERCIAL

## 2024-12-09 DIAGNOSIS — Z95.1 STATUS POST CORONARY ARTERY BYPASS GRAFT: Primary | ICD-10-CM

## 2024-12-09 PROCEDURE — 93798 PHYS/QHP OP CAR RHAB W/ECG: CPT

## 2024-12-10 RX ORDER — AMLODIPINE BESYLATE 5 MG/1
5 TABLET ORAL DAILY
Qty: 90 TABLET | Refills: 3 | Status: SHIPPED | OUTPATIENT
Start: 2024-12-10

## 2024-12-16 ENCOUNTER — TREATMENT (OUTPATIENT)
Dept: CARDIAC REHAB | Facility: HOSPITAL | Age: 59
End: 2024-12-16
Payer: COMMERCIAL

## 2024-12-16 DIAGNOSIS — Z95.1 STATUS POST CORONARY ARTERY BYPASS GRAFT: Primary | ICD-10-CM

## 2024-12-16 PROCEDURE — 93798 PHYS/QHP OP CAR RHAB W/ECG: CPT

## 2025-01-25 DIAGNOSIS — E11.65 TYPE 2 DIABETES MELLITUS WITH HYPERGLYCEMIA, WITHOUT LONG-TERM CURRENT USE OF INSULIN: Primary | ICD-10-CM

## 2025-01-27 RX ORDER — METOPROLOL TARTRATE 100 MG/1
100 TABLET ORAL EVERY 12 HOURS SCHEDULED
Qty: 180 TABLET | Refills: 0 | Status: SHIPPED | OUTPATIENT
Start: 2025-01-27

## 2025-01-27 RX ORDER — ACYCLOVIR 400 MG/1
TABLET ORAL
Qty: 3 EACH | Refills: 3 | Status: SHIPPED | OUTPATIENT
Start: 2025-01-27

## 2025-01-27 NOTE — TELEPHONE ENCOUNTER
Rx Refill Note  Requested Prescriptions     Pending Prescriptions Disp Refills    metoprolol tartrate (LOPRESSOR) 100 MG tablet [Pharmacy Med Name: Metoprolol Tartrate 100 MG Oral Tablet] 180 tablet 0     Sig: TAKE 1 TABLET BY MOUTH EVERY 12 HOURS      Last office visit with prescribing clinician: 10/28/2024   Last telemedicine visit with prescribing clinician: Visit date not found   Next office visit with prescribing clinician: 4/28/2025                         Would you like a call back once the refill request has been completed: [] Yes [] No    If the office needs to give you a call back, can they leave a voicemail: [] Yes [] No    Subhash Hogue MA  01/27/25, 07:55 EST

## 2025-02-27 DIAGNOSIS — E11.65 TYPE 2 DIABETES MELLITUS WITH HYPERGLYCEMIA, WITHOUT LONG-TERM CURRENT USE OF INSULIN: ICD-10-CM

## 2025-04-03 RX ORDER — METOPROLOL TARTRATE 100 MG/1
100 TABLET ORAL EVERY 12 HOURS SCHEDULED
Qty: 180 TABLET | Refills: 0 | Status: SHIPPED | OUTPATIENT
Start: 2025-04-03

## 2025-04-03 NOTE — TELEPHONE ENCOUNTER
Rx Refill Note  Requested Prescriptions     Pending Prescriptions Disp Refills    metoprolol tartrate (LOPRESSOR) 100 MG tablet [Pharmacy Med Name: Metoprolol Tartrate 100 MG Oral Tablet] 180 tablet 0     Sig: TAKE 1 TABLET BY MOUTH EVERY 12 HOURS      Last office visit with prescribing clinician: 10/28/2024   Last telemedicine visit with prescribing clinician: Visit date not found   Next office visit with prescribing clinician: 4/28/2025                         Would you like a call back once the refill request has been completed: [] Yes [] No    If the office needs to give you a call back, can they leave a voicemail: [] Yes [] No    Subhash Hogue MA  04/03/25, 09:46 EDT

## 2025-04-22 LAB
ALBUMIN SERPL-MCNC: 4.3 G/DL (ref 3.8–4.9)
ALBUMIN/CREAT UR: 21 MG/G CREAT (ref 0–29)
ALP SERPL-CCNC: 79 IU/L (ref 44–121)
ALT SERPL-CCNC: 17 IU/L (ref 0–44)
AMBIG ABBREV CMP14 DEFAULT: NORMAL
AMBIG ABBREV LP DEFAULT: NORMAL
AST SERPL-CCNC: 19 IU/L (ref 0–40)
BILIRUB SERPL-MCNC: 0.3 MG/DL (ref 0–1.2)
BUN SERPL-MCNC: 19 MG/DL (ref 8–27)
BUN/CREAT SERPL: 18 (ref 10–24)
CALCIUM SERPL-MCNC: 8.9 MG/DL (ref 8.6–10.2)
CHLORIDE SERPL-SCNC: 105 MMOL/L (ref 96–106)
CHOLEST SERPL-MCNC: 149 MG/DL (ref 100–199)
CO2 SERPL-SCNC: 23 MMOL/L (ref 20–29)
CREAT SERPL-MCNC: 1.05 MG/DL (ref 0.76–1.27)
CREAT UR-MCNC: 70.2 MG/DL
EGFRCR SERPLBLD CKD-EPI 2021: 81 ML/MIN/1.73
GLOBULIN SER CALC-MCNC: 2.1 G/DL (ref 1.5–4.5)
GLUCOSE SERPL-MCNC: 150 MG/DL (ref 70–99)
HBA1C MFR BLD: 7.9 % (ref 4.8–5.6)
HDLC SERPL-MCNC: 17 MG/DL
LDL CALC COMMENT:: ABNORMAL
LDLC SERPL CALC-MCNC: ABNORMAL MG/DL (ref 0–99)
MICROALBUMIN UR-MCNC: 14.7 UG/ML
POTASSIUM SERPL-SCNC: 4.7 MMOL/L (ref 3.5–5.2)
PROT SERPL-MCNC: 6.4 G/DL (ref 6–8.5)
SODIUM SERPL-SCNC: 141 MMOL/L (ref 134–144)
TRIGL SERPL-MCNC: 953 MG/DL (ref 0–149)
VLDLC SERPL CALC-MCNC: ABNORMAL MG/DL (ref 5–40)

## 2025-04-28 ENCOUNTER — OFFICE VISIT (OUTPATIENT)
Dept: ENDOCRINOLOGY | Facility: CLINIC | Age: 60
End: 2025-04-28
Payer: COMMERCIAL

## 2025-04-28 ENCOUNTER — OFFICE VISIT (OUTPATIENT)
Dept: CARDIOLOGY | Facility: CLINIC | Age: 60
End: 2025-04-28
Payer: COMMERCIAL

## 2025-04-28 VITALS
OXYGEN SATURATION: 100 % | HEIGHT: 72 IN | SYSTOLIC BLOOD PRESSURE: 123 MMHG | WEIGHT: 208 LBS | DIASTOLIC BLOOD PRESSURE: 65 MMHG | HEART RATE: 65 BPM | BODY MASS INDEX: 28.17 KG/M2

## 2025-04-28 VITALS
HEART RATE: 69 BPM | OXYGEN SATURATION: 99 % | SYSTOLIC BLOOD PRESSURE: 110 MMHG | DIASTOLIC BLOOD PRESSURE: 68 MMHG | BODY MASS INDEX: 27.9 KG/M2 | WEIGHT: 206 LBS | HEIGHT: 72 IN

## 2025-04-28 DIAGNOSIS — I10 PRIMARY HYPERTENSION: ICD-10-CM

## 2025-04-28 DIAGNOSIS — E10.9 TYPE 1 DIABETES MELLITUS WITHOUT COMPLICATION: ICD-10-CM

## 2025-04-28 DIAGNOSIS — E78.00 PURE HYPERCHOLESTEROLEMIA: ICD-10-CM

## 2025-04-28 DIAGNOSIS — E11.65 TYPE 2 DIABETES MELLITUS WITH HYPERGLYCEMIA, WITHOUT LONG-TERM CURRENT USE OF INSULIN: Primary | ICD-10-CM

## 2025-04-28 DIAGNOSIS — I25.708 CORONARY ARTERY DISEASE OF BYPASS GRAFT OF NATIVE HEART WITH STABLE ANGINA PECTORIS: Primary | ICD-10-CM

## 2025-04-28 DIAGNOSIS — E78.1 FAMILIAL HYPERTRIGLYCERIDEMIA: ICD-10-CM

## 2025-04-28 DIAGNOSIS — E11.21 DIABETIC NEPHROPATHY ASSOCIATED WITH TYPE 2 DIABETES MELLITUS: ICD-10-CM

## 2025-04-28 PROCEDURE — 99214 OFFICE O/P EST MOD 30 MIN: CPT | Performed by: INTERNAL MEDICINE

## 2025-04-28 PROCEDURE — 95251 CONT GLUC MNTR ANALYSIS I&R: CPT | Performed by: INTERNAL MEDICINE

## 2025-04-28 RX ORDER — FLURBIPROFEN SODIUM 0.3 MG/ML
SOLUTION/ DROPS OPHTHALMIC
COMMUNITY
Start: 2025-03-08 | End: 2025-04-29

## 2025-04-28 RX ORDER — BLOOD SUGAR DIAGNOSTIC
STRIP MISCELLANEOUS
COMMUNITY
Start: 2025-04-01

## 2025-04-28 RX ORDER — INSULIN HUMAN 500 [IU]/ML
90 INJECTION, SOLUTION SUBCUTANEOUS
Qty: 60 ML | Refills: 5
Start: 2025-04-28

## 2025-04-28 NOTE — PATIENT INSTRUCTIONS
Continue current medications  Increase Humulin  insulin to 90 units subcu 3 times a day half an hour before each meal  Start all assassin 80 mg subcu monthly  Always keep glucose source in case of low blood sugars and continue to work on diet and activity.  Labs before follow-up.

## 2025-04-28 NOTE — PROGRESS NOTES
Endocrine Progress Note Outpatient     Patient Care Team:  Prabhu Downing MD as PCP - General  Prabhu Downing MD as PCP - Family Medicine  Ham Jacinto MD as Consulting Physician (Cardiology)    Chief Complaint: Follow up type 2 diabetes    HPI: 60-year-old male with history of type 2 diabetes, hypertension, hyperlipidemia specifically hypertriglyceridemia is here for follow-up.    For type 2 diabetes currently on metformin 1000 twice a day, U 500 insulin 80 units sq before each meal 3 times a day, Jardiance 25 mg p.o. daily.   He is now on Dexcom G7 monitoring system.    Hypertension: Well-controlled    Familial hypertriglyceridemia: He is currently on Crestor with fenofibrate and cholestyramine.  Vascepa was not covered in past.   Is on generic fish oil 3 capsules twice a day.  We tried Praluent however he is not taking that at this time, we also referred him to a nephrologist for plasmapheresis for triglycerides however he tells me that this is cost prohibitive.  Does have history of pancreatitis.  No episode of pancreatitis since last seen in May 2020.    Past Medical History:   Diagnosis Date    CAD (coronary artery disease)     Hyperlipidemia     Hypertension     Type 2 diabetes mellitus        Social History     Socioeconomic History    Marital status:      Spouse name: hari    Number of children: 0    Years of education: 19   Tobacco Use    Smoking status: Never    Smokeless tobacco: Never   Vaping Use    Vaping status: Never Used   Substance and Sexual Activity    Alcohol use: No    Drug use: Defer    Sexual activity: Defer       Family History   Problem Relation Age of Onset    Heart attack Mother     Alcohol abuse Father        Allergies   Allergen Reactions    Levofloxacin Unknown (See Comments)       ROS:   Constitutional:  Admit fatigue, tiredness.    Eyes:  Denies change in visual acuity   HENT:  Denies nasal congestion or sore throat   Respiratory: denies cough, shortness of breath.    Cardiovascular:  denies chest pain, edema   GI:  Denies abdominal pain, nausea, vomiting.   Musculoskeletal:  Denies back pain or joint pain   Integument:  Denies dry skin and rash   Neurologic:  Denies headache, focal weakness or sensory changes   Endocrine:  Denies polyuria or polydipsia   Psychiatric:  Denies depression or anxiety      Vitals:    04/28/25 0913   BP: 110/68   Pulse: 69   SpO2: 99%     BMI: 27.9  Physical Exam:  GEN: NAD, conversant  EYES: EOMI,   NECK: no thyromegaly,   CV: RRR,   LUNG: CTA  PSYCH: AOX3, appropriate mood, affect normal      Results Review:     I reviewed the patient's new clinical results.      Lab Results   Component Value Date    GLUCOSE 150 (H) 04/21/2025    BUN 19 04/21/2025    CREATININE 1.05 04/21/2025    EGFRIFNONA 76 12/30/2021    BCR 18 04/21/2025    K 4.7 04/21/2025    CO2 23 04/21/2025    CALCIUM 8.9 04/21/2025    ALBUMIN 4.3 04/21/2025    AST 19 04/21/2025    ALT 17 04/21/2025    CHOL 149 03/04/2024    TRIG 953 (H) 04/21/2025    LDL Comment (A) 04/21/2025    HDL 17 (L) 04/21/2025     Dexcom download interpretation: Dates covered was between April 15, 2025 to April 28, 2025.  Average blood sugar is 221.  Spending about 35% in the range and 64% above range and 1% below range.  Glucose graph does show significant hyperglycemia.      Medication Review: Reviewed.       Current Outpatient Medications:     Accu-Chek Guide Test test strip, USE 1 STRIP 4 TIMES DAILY AS INSTRUCTED, Disp: , Rfl:     acetaminophen (TYLENOL) 325 MG tablet, Take 2 tablets by mouth Every 4 (Four) Hours As Needed for Mild Pain., Disp: , Rfl:     amLODIPine (NORVASC) 5 MG tablet, TAKE 1 TABLET BY MOUTH DAILY, Disp: 90 tablet, Rfl: 3    Apple Cider Vinegar 188 MG capsule, Take 1 capsule by mouth Daily., Disp: , Rfl:     aspirin 81 MG tablet, Take 1 tablet by mouth Daily., Disp: , Rfl:     B-D UF III MINI PEN NEEDLES 31G X 5 MM misc, USE TO INJECT INSULIN 3 TIMES A DAY, Disp: , Rfl:     calcium  carbonate, oyster shell, 500 MG tablet tablet, Take 1 tablet by mouth Daily., Disp: , Rfl:     cholestyramine light (PREVALITE) 4 GM/DOSE powder, Take 1 packet by mouth 2 (Two) Times a Day With Meals., Disp: 180 g, Rfl: 6    Continuous Glucose Sensor (Dexcom G7 Sensor) misc, USE 1 SENSOR EVERY 10 DAYS, Disp: 3 each, Rfl: 3    empagliflozin (Jardiance) 25 MG tablet tablet, Take 1 tablet by mouth once daily, Disp: 90 tablet, Rfl: 3    fenofibrate 160 MG tablet, TAKE 1 TABLET BY MOUTH DAILY, Disp: 90 tablet, Rfl: 4    HYDROcodone-acetaminophen (NORCO) 5-325 MG per tablet, Take 1 tablet by mouth Every 6 (Six) Hours As Needed for Moderate Pain., Disp: 25 tablet, Rfl: 0    Insulin Regular Human, Conc, (HumuLIN R U-500 KwikPen) 500 UNIT/ML solution pen-injector CONCENTRATED injection, Inject 80 Units under the skin into the appropriate area as directed 3 (Three) Times a Day Before Meals. With Meals, Disp: , Rfl:     metFORMIN (GLUCOPHAGE) 1000 MG tablet, TAKE 1 TABLET BY MOUTH TWICE A DAY WITH A MEAL, Disp: 60 tablet, Rfl: 3    metoprolol tartrate (LOPRESSOR) 100 MG tablet, TAKE 1 TABLET BY MOUTH EVERY 12 HOURS, Disp: 180 tablet, Rfl: 0    nitroglycerin (NITROSTAT) 0.4 MG SL tablet, 1 under the tongue as needed for angina, may repeat q5mins for up three doses, Disp: 25 tablet, Rfl: 1    Omega-3 Fatty Acids (OMEGA-3 2100 PO), Take 1 capsule by mouth Daily., Disp: , Rfl:     RED YEAST RICE EXTRACT PO, Take 1 capsule by mouth Daily., Disp: , Rfl:     rosuvastatin (CRESTOR) 40 MG tablet, Take 1 tablet by mouth once daily, Disp: 90 tablet, Rfl: 3    furosemide (LASIX) 40 MG tablet, Take 1 tablet by mouth Daily for 30 days., Disp: 30 tablet, Rfl: 0      Assessment & Plan   1.  Diabetes mellitus type II with Hyperglycemia: Uncontrolled but improving with A1c at 7.9%.  Will change Humulin R U5 insulin to 90 units subcu 3 times a day half an hour before each meal.  Continue rest of the medication.  Continue to work on diet and  "activity and always keep glucose source in case of low blood sugars.    We have talked about insulin pump for long-term management of his diabetes, he has not been able to get off his work schedule and he will let us know when he is ready for the insulin pump evaluation.    2.  Familial hypertriglyceridemia: He will definitely benefit from the newer agent Olezarsen, will start at 80 mg subcu monthly and continue his current medications.  Follow lipid panel.    3.  Hypertension: Well-controlled, continue current medication    4.  Diabetic nephropathy: On lisinopril.  Blood pressure is doing well.    5.  Hypocalcemia: Serum calcium now normal.    Assessment and plan from September 23, 2024 reviewed and updated.          Rosario Mendenhall MD FACE.      EMR Dragon / transcription disclaimer:     \"Dictated utilizing Dragon dictation\".                 "

## 2025-04-28 NOTE — PROGRESS NOTES
Subjective:     Encounter Date:04/28/2025      Patient ID: Rodney Chaney is a 60 y.o. male.    Chief Complaint:  Coronary Artery Disease  Pertinent negatives include no chest pain, dizziness, leg swelling, palpitations or shortness of breath.   60-year-old white male with history of coronary disease status post coronary bypass surgery hypertension hyperlipidemia diabetes presents to the office for a follow-up.  Patient is currently stable without any symptoms of chest pain or shortness of breath at rest or exertion.  No compressively PND orthopnea.  No palpitation dizziness syncope or swelling of the feet.  He is taking medicines regular.  Does not smoke    The following portions of the patient's history were reviewed and updated as appropriate: allergies, current medications, past family history, past medical history, past social history, past surgical history, and problem list.  Past Medical History:   Diagnosis Date    CAD (coronary artery disease)     Hyperlipidemia     Hypertension     Type 2 diabetes mellitus      Past Surgical History:   Procedure Laterality Date    APPENDECTOMY      CARDIAC CATHETERIZATION N/A 8/30/2024    Procedure: Left Heart Cath with angiogram;  Surgeon: Ham Jacinto MD;  Location:  [facility] CATH INVASIVE LOCATION;  Service: Cardiovascular;  Laterality: N/A;    CARDIAC CATHETERIZATION Right 8/30/2024    Procedure: Coronary angiography;  Surgeon: Ham Jacinto MD;  Location:  [facility] CATH INVASIVE LOCATION;  Service: Cardiovascular;  Laterality: Right;    CARDIAC CATHETERIZATION N/A 8/30/2024    Procedure: Left ventriculography;  Surgeon: Ham Jacinto MD;  Location:  [facility] CATH INVASIVE LOCATION;  Service: Cardiovascular;  Laterality: N/A;    CARDIAC CATHETERIZATION  8/30/2024    Procedure: Saphenous Vein Graft;  Surgeon: Ham Jacinto MD;  Location:  [facility] CATH INVASIVE LOCATION;  Service: Cardiovascular;;    CHOLECYSTECTOMY      CORONARY ARTERY BYPASS GRAFT      2010    CORONARY  "ARTERY BYPASS GRAFT N/A 9/26/2024    Procedure: CORONARY ARTERY BYPASS GRAFTx 3 grafts  REOP WITH TRANSESOPHAGEAL ECHOCARDIOGRAM;  Surgeon: Osmany Mares MD;  Location: [facility];  Service: Cardiothoracic;  Laterality: N/A;  reop CABG x 3 vein grafts     /65   Pulse 65   Ht 182.9 cm (72.01\")   Wt 94.3 kg (208 lb)   SpO2 100%   BMI 28.20 kg/m²   Family History   Problem Relation Age of Onset    Heart attack Mother     Alcohol abuse Father        Current Outpatient Medications:     Accu-Chek Guide Test test strip, USE 1 STRIP 4 TIMES DAILY AS INSTRUCTED, Disp: , Rfl:     acetaminophen (TYLENOL) 325 MG tablet, Take 2 tablets by mouth Every 4 (Four) Hours As Needed for Mild Pain., Disp: , Rfl:     amLODIPine (NORVASC) 5 MG tablet, TAKE 1 TABLET BY MOUTH DAILY, Disp: 90 tablet, Rfl: 3    Apple Cider Vinegar 188 MG capsule, Take 1 capsule by mouth Daily., Disp: , Rfl:     aspirin 81 MG tablet, Take 1 tablet by mouth Daily., Disp: , Rfl:     B-D UF III MINI PEN NEEDLES 31G X 5 MM misc, USE TO INJECT INSULIN 3 TIMES A DAY, Disp: , Rfl:     calcium carbonate, oyster shell, 500 MG tablet tablet, Take 1 tablet by mouth Daily., Disp: , Rfl:     cholestyramine light (PREVALITE) 4 GM/DOSE powder, Take 1 packet by mouth 2 (Two) Times a Day With Meals., Disp: 180 g, Rfl: 6    Continuous Glucose Sensor (Dexcom G7 Sensor) misc, USE 1 SENSOR EVERY 10 DAYS, Disp: 3 each, Rfl: 3    empagliflozin (Jardiance) 25 MG tablet tablet, Take 1 tablet by mouth once daily, Disp: 90 tablet, Rfl: 3    fenofibrate 160 MG tablet, TAKE 1 TABLET BY MOUTH DAILY, Disp: 90 tablet, Rfl: 4    HYDROcodone-acetaminophen (NORCO) 5-325 MG per tablet, Take 1 tablet by mouth Every 6 (Six) Hours As Needed for Moderate Pain., Disp: 25 tablet, Rfl: 0    Insulin Regular Human CONCENTRATED (HumuLIN R U-500 KwikPen) 500 UNIT/ML solution pen-injector injection, Inject 90 Units under the skin into the appropriate area as directed 3 (Three) Times a Day " Before Meals. With Meals, Disp: 60 mL, Rfl: 5    metFORMIN (GLUCOPHAGE) 1000 MG tablet, TAKE 1 TABLET BY MOUTH TWICE A DAY WITH A MEAL, Disp: 60 tablet, Rfl: 3    metoprolol tartrate (LOPRESSOR) 100 MG tablet, TAKE 1 TABLET BY MOUTH EVERY 12 HOURS, Disp: 180 tablet, Rfl: 0    nitroglycerin (NITROSTAT) 0.4 MG SL tablet, 1 under the tongue as needed for angina, may repeat q5mins for up three doses, Disp: 25 tablet, Rfl: 1    Olezarsen Sodium 80 MG/0.8ML solution auto-injector, Inject 80 mg under the skin into the appropriate area as directed Every 30 (Thirty) Days., Disp: , Rfl:     Omega-3 Fatty Acids (OMEGA-3 2100 PO), Take 1 capsule by mouth Daily., Disp: , Rfl:     RED YEAST RICE EXTRACT PO, Take 1 capsule by mouth Daily., Disp: , Rfl:     rosuvastatin (CRESTOR) 40 MG tablet, Take 1 tablet by mouth once daily, Disp: 90 tablet, Rfl: 3    furosemide (LASIX) 40 MG tablet, Take 1 tablet by mouth Daily for 30 days., Disp: 30 tablet, Rfl: 0  Allergies   Allergen Reactions    Levofloxacin Unknown (See Comments)     Social History     Socioeconomic History    Marital status:      Spouse name: hari    Number of children: 0    Years of education: 19   Tobacco Use    Smoking status: Never    Smokeless tobacco: Never   Vaping Use    Vaping status: Never Used   Substance and Sexual Activity    Alcohol use: No    Drug use: Defer    Sexual activity: Defer     Review of Systems   Constitutional: Negative for malaise/fatigue.   Cardiovascular:  Negative for chest pain, dyspnea on exertion, leg swelling and palpitations.   Respiratory:  Negative for cough and shortness of breath.    Gastrointestinal:  Negative for abdominal pain, nausea and vomiting.   Neurological:  Negative for dizziness, headaches, light-headedness, numbness and weakness.   All other systems reviewed and are negative.             Objective:     Constitutional:       Appearance: Well-developed.   Eyes:      General: No scleral icterus.      Conjunctiva/sclera: Conjunctivae normal.   HENT:      Head: Normocephalic and atraumatic.   Neck:      Vascular: No carotid bruit or JVD.   Pulmonary:      Effort: Pulmonary effort is normal.      Breath sounds: Normal breath sounds. No wheezing. No rales.   Cardiovascular:      Normal rate. Regular rhythm.   Pulses:     Intact distal pulses.   Abdominal:      General: Bowel sounds are normal.      Palpations: Abdomen is soft.   Musculoskeletal:      Cervical back: Normal range of motion and neck supple. Skin:     General: Skin is warm and dry.      Findings: No rash.   Neurological:      Mental Status: Alert.       Procedures    Lab Review:         MDM    #1 coronary disease  Patient had coronary bypass surgery x 3 vessels with a LIMA to LAD and SVG to the marginal branch and RCA and is currently stable on medical therapy without any angina and has normal LV function  2.  Diabetes  Debo on insulin and oral medicines  3.  Hypertension  Patient blood pressure currently stable on amlodipine and metoprolol  4.  Hyperlipidemia  Patient is on Crestor and his triglycerides are very high that the LDL could not be calculated.    Patient's previous medical records, labs, and EKG were reviewed and discussed with the patient at today's visit.

## 2025-04-29 RX ORDER — FLURBIPROFEN SODIUM 0.3 MG/ML
SOLUTION/ DROPS OPHTHALMIC
Qty: 100 EACH | Refills: 12 | Status: SHIPPED | OUTPATIENT
Start: 2025-04-29

## 2025-06-02 RX ORDER — INSULIN HUMAN 500 [IU]/ML
INJECTION, SOLUTION SUBCUTANEOUS
Qty: 15 ML | Refills: 5 | Status: SHIPPED | OUTPATIENT
Start: 2025-06-02

## 2025-06-25 DIAGNOSIS — E11.65 TYPE 2 DIABETES MELLITUS WITH HYPERGLYCEMIA, WITHOUT LONG-TERM CURRENT USE OF INSULIN: ICD-10-CM

## 2025-06-25 RX ORDER — ACYCLOVIR 400 MG/1
TABLET ORAL
Qty: 3 EACH | Refills: 3 | Status: SHIPPED | OUTPATIENT
Start: 2025-06-25

## 2025-07-07 RX ORDER — METOPROLOL TARTRATE 100 MG/1
100 TABLET ORAL EVERY 12 HOURS SCHEDULED
Qty: 180 TABLET | Refills: 0 | Status: SHIPPED | OUTPATIENT
Start: 2025-07-07

## 2025-07-07 NOTE — TELEPHONE ENCOUNTER
Rx Refill Note  Requested Prescriptions     Signed Prescriptions Disp Refills    metoprolol tartrate (LOPRESSOR) 100 MG tablet 180 tablet 0     Sig: TAKE 1 TABLET BY MOUTH EVERY 12 HOURS     Authorizing Provider: TEN HOLLINS     Ordering User: CAMERON MCCARTHY      Last office visit with prescribing clinician: 4/28/2025   Last telemedicine visit with prescribing clinician: Visit date not found   Next office visit with prescribing clinician: 11/10/2025                         Would you like a call back once the refill request has been completed: [] Yes [] No    If the office needs to give you a call back, can they leave a voicemail: [] Yes [] No    Cameron Mccarthy MA  07/07/25, 13:48 EDT

## 2025-07-10 DIAGNOSIS — E11.65 TYPE 2 DIABETES MELLITUS WITH HYPERGLYCEMIA, WITHOUT LONG-TERM CURRENT USE OF INSULIN: ICD-10-CM

## 2025-07-28 RX ORDER — EMPAGLIFLOZIN 25 MG/1
25 TABLET, FILM COATED ORAL DAILY
Qty: 30 TABLET | Refills: 4 | Status: SHIPPED | OUTPATIENT
Start: 2025-07-28

## 2025-08-11 RX ORDER — ROSUVASTATIN CALCIUM 40 MG/1
40 TABLET, COATED ORAL DAILY
Qty: 90 TABLET | Refills: 3 | Status: SHIPPED | OUTPATIENT
Start: 2025-08-11

## (undated) DEVICE — CATH TDILUT SWANGANZ VIP 7.5F 110CM

## (undated) DEVICE — CORONARY ARTERY BYPASS GRAFT MARKERS, STAINLESS STEEL, DISTAL, WITHOUT HOLDER: Brand: ANASTOMARK CORONARY ARTERY BYPASS GRAFT MARKERS, STAINLESS STEEL, DISTAL

## (undated) DEVICE — BLOWER MISTER CLEARVIEW W/TBG

## (undated) DEVICE — SUT SILK 4/0 TIES 18IN A183H

## (undated) DEVICE — CANN ART EOPA 3D NV W/CONN 20F

## (undated) DEVICE — SUT PROLN 7/0 BV1 D/A 30IN 8703H

## (undated) DEVICE — PK OPN HEART WHT WRP 50

## (undated) DEVICE — CANN RETRGR STYLET RSCP 15F

## (undated) DEVICE — BNDG,ELSTC,MATRIX,STRL,4"X5YD,LF,HOOK&LP: Brand: MEDLINE

## (undated) DEVICE — BLOOD TRANSFUSION FILTER: Brand: HAEMONETICS

## (undated) DEVICE — TUBING, SUCTION, 1/4" X 12', STRAIGHT: Brand: MEDLINE

## (undated) DEVICE — PRESSURE TUBING: Brand: TRUWAVE

## (undated) DEVICE — GAUZE,SPONGE,4"X4",32PLY,XRAY,STRL,LF: Brand: MEDLINE

## (undated) DEVICE — KT CATH CV ACC MAC 2L SFTY 9F 4 1/2IN

## (undated) DEVICE — DRSNG SLVR/ANTIBAC PRIMASEAL POST/OP ADHS 3.5X10IN

## (undated) DEVICE — CABL BIPOL W/ALLGTR CLIP/SM 12FT

## (undated) DEVICE — CATH ART RADL 20GA 1 3/4IN LF

## (undated) DEVICE — SUT PROLN 5/0 V5 36IN 8934H

## (undated) DEVICE — 28 FR STRAIGHT – SOFT PVC CATHETER: Brand: PVC THORACIC CATHETERS

## (undated) DEVICE — SUT SILK 2/0 TIES 18IN A185H

## (undated) DEVICE — PINNACLE INTRODUCER SHEATH: Brand: PINNACLE

## (undated) DEVICE — BIOPATCH™ ANTIMICROBIAL DRESSING WITH CHLORHEXIDINE GLUCONATE IS A HYDROPHILLIC POLYURETHANE ABSORPTIVE FOAM WITH CHLORHEXIDINE GLUCONATE (CHG) WHICH INHIBITS BACTERIAL GROWTH UNDER THE DRESSING. THE DRESSING IS INTENDED TO BE USED TO ABSORB EXUDATE, COVER A WOUND CAUSED BY VASCULAR AND NONVASCULAR PERCUTANEOUS MEDICAL DEVICES DURING SURGERY, AS WELL AS REDUCE LOCAL INFECTION AND COLONIZATION OF MICROORGANISMS.: Brand: BIOPATCH

## (undated) DEVICE — Device

## (undated) DEVICE — CATH DIAG IMPULSE FL4 6F 100CM

## (undated) DEVICE — 3M™ TEGADERM™ I.V. ADVANCED SECUREMENT DRESSING, 1685, 3-1/2 IN X 4-1/2 IN (8.5 CM X 11.5 CM), 50/CT 4CT/CASE: Brand: 3M™ TEGADERM™

## (undated) DEVICE — SOL IRR NACL 0.9PCT BT 1000ML

## (undated) DEVICE — CANN AORT ROOT DLP VNT/8IN 14G 7F

## (undated) DEVICE — 28 FR RIGHT ANGLE – SOFT PVC CATHETER: Brand: PVC THORACIC CATHETERS

## (undated) DEVICE — BG BLD SYS

## (undated) DEVICE — ELECTRD BLD EZ CLN STD 6.5IN

## (undated) DEVICE — PK ATS CUST W CARDIOTOMY RESEVOIR

## (undated) DEVICE — SUT SILK 2/0 SH CR8 18IN CR8 C012D

## (undated) DEVICE — SENSR CERBRL O2 PK/2

## (undated) DEVICE — DRAPE SHEET ULTRAGARD: Brand: MEDLINE

## (undated) DEVICE — ST IV BLD W/HANDPUMP YSITE 125IN

## (undated) DEVICE — SYS PERFUS SEP PLATLT W TIPS CUST

## (undated) DEVICE — SAFEDGE 2108 SERIES SAGITTAL BLADE STERNUM REVISION (40.3 X 0.64 X 48.4MM): Brand: SAFEDGE

## (undated) DEVICE — SUT PROLN 4/0 V5 36IN 8935H

## (undated) DEVICE — SAFELINER OUTER SHELL SUCTION CANISTER: Brand: DEROYAL

## (undated) DEVICE — ELECTRD DEFIB M/FUNC PROPADZ RADIOL 2PK

## (undated) DEVICE — BNDG,ELSTC,MATRIX,STRL,6"X5YD,LF,HOOK&LP: Brand: MEDLINE

## (undated) DEVICE — PK PERFUS CUST W/CARDIOPLEGIA

## (undated) DEVICE — ELECTRD DEFIB M/FUNC PROPADZ STRL 2PK

## (undated) DEVICE — CONNECT Y INTERSEPT W/LL 3/8 X 3/8 X 3/8IN

## (undated) DEVICE — ANTIBACTERIAL UNDYED BRAIDED (POLYGLACTIN 910), SYNTHETIC ABSORBABLE SUTURE: Brand: COATED VICRYL

## (undated) DEVICE — SUT PDS2 0 CT1 27IN Z340H MF VIL

## (undated) DEVICE — SUT SILK 0 CT1 CR8 18IN C021D

## (undated) DEVICE — CATH DIAG IMPULSE FR4 6F 100CM

## (undated) DEVICE — SOL NACL 0.9PCT 1000ML

## (undated) DEVICE — SYS VASOVIEW HEMOPRO ENDOSCOPIC HARVST VESL

## (undated) DEVICE — SUT VIC COAT PLS ANTIBAC TP1 27IN

## (undated) DEVICE — TOWEL,OR,DSP,ST,WHITE,DLX,4/PK,20PK/CS: Brand: MEDLINE

## (undated) DEVICE — SUT PROLN 5/0 RB2 D/A 30IN 8710H

## (undated) DEVICE — SUT PROLN 6/0 RB2 D/A 30IN 8711H

## (undated) DEVICE — SOL IRR H2O BTL 1000ML STRL

## (undated) DEVICE — ROTATING SURGICAL PUNCHES, 1 PER POUCH: Brand: A&E MEDICAL / ROTATING SURGICAL PUNCHES

## (undated) DEVICE — BLD SCLPL BEAVR MINI STR 2BVL 180D LF

## (undated) DEVICE — 3M™ TEGADERM™ IV TRANSPARENT FILM DRESSING WITH BORDER 100 BAGS/CARTON 4 CARTONS/CASE 1633: Brand: 3M™ TEGADERM™

## (undated) DEVICE — DGW .035 MC J3MM 150CM T H AMP: Brand: EMERALD

## (undated) DEVICE — RADIFOCUS OBTURATOR: Brand: RADIFOCUS

## (undated) DEVICE — PRESSURE MONITORING SET: Brand: TRUWAVE, VAMP PLUS

## (undated) DEVICE — TBG INSUFF MALE L/L W 12MM CON: Brand: MEDLINE INDUSTRIES, INC.

## (undated) DEVICE — ADAPT ANTEGRADE RETRGR

## (undated) DEVICE — PK TRY HEART CATH 50

## (undated) DEVICE — SUT PROLN 4/0 V7 36IN 8975H

## (undated) DEVICE — HEMOCONCENTRATOR PERFUS LPS06

## (undated) DEVICE — CATH DIAG IMPULSE MPA 6F 100CM

## (undated) DEVICE — SUT PROLN 3/0 V7 D/A 36IN 8976H

## (undated) DEVICE — SUT SILK 2 SUTUPAK TIE 60IN SA8H 2STRAND

## (undated) DEVICE — GLV SURG BIOGEL LTX PF 7 1/2

## (undated) DEVICE — SUT PROLENE PP 7/0 BV175-6 24IN

## (undated) DEVICE — 500ML,PRESSURE INFUSER W/STOPCOCK: Brand: MEDLINE

## (undated) DEVICE — SUP ARMBRD HANDAID ART/LINE 9IN

## (undated) DEVICE — CATH DIAG IMPULSE PIG .056 6F 110CM